# Patient Record
Sex: MALE | Race: BLACK OR AFRICAN AMERICAN | NOT HISPANIC OR LATINO | Employment: UNEMPLOYED | RURAL
[De-identification: names, ages, dates, MRNs, and addresses within clinical notes are randomized per-mention and may not be internally consistent; named-entity substitution may affect disease eponyms.]

---

## 2020-11-17 ENCOUNTER — HISTORICAL (OUTPATIENT)
Dept: ADMINISTRATIVE | Facility: HOSPITAL | Age: 35
End: 2020-11-17

## 2020-11-17 LAB
BACTERIA #/AREA URNS HPF: ABNORMAL /HPF
BILIRUB UR QL STRIP: NEGATIVE MG/DL
CLARITY UR: ABNORMAL
COLOR UR: ABNORMAL
GLUCOSE UR STRIP-MCNC: NORMAL MG/DL
KETONES UR STRIP-SCNC: NEGATIVE MG/DL
LEUKOCYTE ESTERASE UR QL STRIP: ABNORMAL LEU/UL
NITRITE UR QL STRIP: NEGATIVE MG/DL
PH UR STRIP: 7 PH UNITS (ref 5–8)
PROT UR QL STRIP: >=300 MG/DL
RBC # UR STRIP: ABNORMAL ERY/UL
RBC #/AREA URNS HPF: ABNORMAL /HPF (ref 0–3)
SP GR UR STRIP: 1.02 (ref 1–1.03)
SQUAMOUS #/AREA URNS LPF: ABNORMAL /LPF
UROBILINOGEN UR STRIP-ACNC: 0.2 MG/DL
WBC #/AREA URNS HPF: ABNORMAL /HPF (ref 0–5)

## 2020-11-21 LAB
REPORT: NORMAL

## 2020-11-28 ENCOUNTER — HISTORICAL (OUTPATIENT)
Dept: ADMINISTRATIVE | Facility: HOSPITAL | Age: 35
End: 2020-11-28

## 2020-11-29 LAB
EST. AVERAGE GLUCOSE BLD GHB EST-MCNC: 124 MG/DL
HBA1C MFR BLD HPLC: 6.3 %

## 2020-12-01 ENCOUNTER — HISTORICAL (OUTPATIENT)
Dept: ADMINISTRATIVE | Facility: HOSPITAL | Age: 35
End: 2020-12-01

## 2020-12-03 ENCOUNTER — HISTORICAL (OUTPATIENT)
Dept: ADMINISTRATIVE | Facility: HOSPITAL | Age: 35
End: 2020-12-03

## 2020-12-06 LAB
REPORT: NORMAL

## 2020-12-22 ENCOUNTER — HISTORICAL (OUTPATIENT)
Dept: ADMINISTRATIVE | Facility: HOSPITAL | Age: 35
End: 2020-12-22

## 2020-12-22 LAB
ALBUMIN SERPL BCP-MCNC: 3.2 G/DL (ref 3.4–5)
ALBUMIN/GLOB SERPL: 0.8 {RATIO}
ALP SERPL-CCNC: 64 U/L (ref 46–116)
ALT SERPL W P-5'-P-CCNC: 22 U/L (ref 14–63)
ANION GAP SERPL CALCULATED.3IONS-SCNC: 25 MMOL/L
ANISOCYTOSIS BLD QL SMEAR: ABNORMAL
AST SERPL W P-5'-P-CCNC: 15 U/L (ref 15–37)
BASOPHILS # BLD AUTO: 0.01 X10E3/UL (ref 0–0.2)
BASOPHILS NFR BLD AUTO: 0.2 % (ref 0–1)
BILIRUB SERPL-MCNC: 0.4 MG/DL (ref 0.2–1)
BUN SERPL-MCNC: 123 MG/DL (ref 9–20)
BUN/CREAT SERPL: 11.4
CALCIUM SERPL-MCNC: 8.8 MG/DL (ref 8.5–10.1)
CHLORIDE SERPL-SCNC: 103 MMOL/L (ref 98–107)
CO2 SERPL-SCNC: 18 MMOL/L (ref 21–32)
CREAT SERPL-MCNC: 10.79 MG/DL (ref 0.66–1.25)
CRENATED CELLS: ABNORMAL
D DIMER PPP FEU-MCNC: 1.58 UG/ML (ref 0–0.47)
EOSINOPHIL # BLD AUTO: 0.18 X10E3/UL (ref 0–0.5)
EOSINOPHIL NFR BLD AUTO: 4.3 % (ref 1–4)
EOSINOPHIL NFR BLD MANUAL: 4 % (ref 1–4)
ERYTHROCYTE [DISTWIDTH] IN BLOOD BY AUTOMATED COUNT: 14.9 % (ref 11.5–14.5)
GLOBULIN SER-MCNC: 4.1 G/DL
GLUCOSE SERPL-MCNC: 117 MG/DL (ref 74–106)
HCT VFR BLD AUTO: 37 % (ref 40–54)
HGB BLD-MCNC: 11.9 G/DL (ref 13.5–18)
LYMPHOCYTES # BLD AUTO: 0.66 X10E3/UL (ref 1–4.8)
LYMPHOCYTES NFR BLD AUTO: 15.8 % (ref 27–41)
LYMPHOCYTES NFR BLD MANUAL: 14 % (ref 27–41)
MCH RBC QN AUTO: 28.5 PG (ref 27–31)
MCHC RBC AUTO-ENTMCNC: 32.2 G/DL (ref 32–36)
MCV RBC AUTO: 89 FL (ref 80–96)
MONOCYTES # BLD AUTO: 0.37 X10E3/UL (ref 0–0.8)
MONOCYTES NFR BLD AUTO: 8.8 % (ref 2–6)
MONOCYTES NFR BLD MANUAL: 12 % (ref 2–6)
MPC BLD CALC-MCNC: 12.4 FL (ref 9.4–12.4)
NEUTROPHILS # BLD AUTO: 2.97 X10E3/UL (ref 1.8–7.7)
NEUTROPHILS NFR BLD AUTO: 70.9 % (ref 53–65)
NEUTS SEG NFR BLD MANUAL: 70 % (ref 50–62)
OVALOCYTES BLD QL SMEAR: ABNORMAL
PLATELET # BLD AUTO: 134 X10E3/UL (ref 150–400)
PLATELET MORPHOLOGY: ABNORMAL
POTASSIUM SERPL-SCNC: 5.5 MMOL/L (ref 3.5–5.1)
PROT SERPL-MCNC: 7.3 G/DL (ref 6.4–8.2)
RBC # BLD AUTO: 4.18 X10E6/UL (ref 4.6–6.2)
SODIUM SERPL-SCNC: 140 MMOL/L (ref 136–145)
WBC # BLD AUTO: 4.19 X10E3/UL (ref 4.5–11)

## 2020-12-23 LAB
CRP SERPL-MCNC: 0.36 MG/DL (ref 0–0.8)
FERRITIN SERPL-MCNC: 534 NG/ML (ref 26–388)

## 2021-01-22 ENCOUNTER — HISTORICAL (OUTPATIENT)
Dept: ADMINISTRATIVE | Facility: HOSPITAL | Age: 36
End: 2021-01-22

## 2021-01-22 LAB
BACTERIA #/AREA URNS HPF: ABNORMAL /HPF
BILIRUB UR QL STRIP: NEGATIVE MG/DL
CLARITY UR: ABNORMAL
COLOR UR: ABNORMAL
GLUCOSE UR STRIP-MCNC: NEGATIVE MG/DL
KETONES UR STRIP-SCNC: NEGATIVE MG/DL
LEUKOCYTE ESTERASE UR QL STRIP: ABNORMAL LEU/UL
NITRITE UR QL STRIP: NEGATIVE MG/DL
PH UR STRIP: 6.5 PH UNITS (ref 5–8)
PROT UR QL STRIP: >=300 MG/DL
RBC # UR STRIP: ABNORMAL ERY/UL
RBC #/AREA URNS HPF: ABNORMAL /HPF (ref 0–3)
SP GR UR STRIP: 1.02 (ref 1–1.03)
SQUAMOUS #/AREA URNS LPF: ABNORMAL /LPF
UROBILINOGEN UR STRIP-ACNC: 0.2 MG/DL
WBC #/AREA URNS HPF: ABNORMAL /HPF (ref 0–5)

## 2021-01-25 LAB
REPORT: 38
REPORT: NORMAL

## 2021-02-06 ENCOUNTER — HISTORICAL (OUTPATIENT)
Dept: ADMINISTRATIVE | Facility: HOSPITAL | Age: 36
End: 2021-02-06

## 2021-02-07 LAB
EST. AVERAGE GLUCOSE BLD GHB EST-MCNC: 127 MG/DL
HBA1C MFR BLD HPLC: 6.4 %

## 2021-02-20 ENCOUNTER — HISTORICAL (OUTPATIENT)
Dept: ADMINISTRATIVE | Facility: HOSPITAL | Age: 36
End: 2021-02-20

## 2021-02-24 LAB
REPORT: 38                 38
REPORT: NORMAL

## 2021-02-25 LAB
REPORT: 38                 38
REPORT: NORMAL

## 2021-07-01 ENCOUNTER — LAB REQUISITION (OUTPATIENT)
Dept: LAB | Facility: HOSPITAL | Age: 36
End: 2021-07-01
Attending: FAMILY MEDICINE
Payer: MEDICARE

## 2021-07-01 DIAGNOSIS — T81.89XA OTHER COMPLICATIONS OF PROCEDURES, NOT ELSEWHERE CLASSIFIED, INITIAL ENCOUNTER: ICD-10-CM

## 2021-07-01 PROCEDURE — 87070 CULTURE OTHR SPECIMN AEROBIC: CPT | Mod: 59

## 2021-07-03 LAB
MICROORGANISM SPEC CULT: ABNORMAL
MICROORGANISM SPEC CULT: ABNORMAL

## 2021-07-14 ENCOUNTER — LAB REQUISITION (OUTPATIENT)
Dept: LAB | Facility: HOSPITAL | Age: 36
End: 2021-07-14
Attending: FAMILY MEDICINE
Payer: MEDICARE

## 2021-07-14 DIAGNOSIS — S81.802A UNSPECIFIED OPEN WOUND, LEFT LOWER LEG, INITIAL ENCOUNTER: ICD-10-CM

## 2021-07-14 DIAGNOSIS — S81.801A UNSPECIFIED OPEN WOUND, RIGHT LOWER LEG, INITIAL ENCOUNTER: ICD-10-CM

## 2021-07-14 PROCEDURE — 87070 CULTURE OTHR SPECIMN AEROBIC: CPT | Performed by: FAMILY MEDICINE

## 2021-07-17 LAB
MICROORGANISM SPEC CULT: ABNORMAL

## 2021-08-08 ENCOUNTER — LAB REQUISITION (OUTPATIENT)
Dept: LAB | Facility: HOSPITAL | Age: 36
End: 2021-08-08
Attending: FAMILY MEDICINE
Payer: MEDICARE

## 2021-08-08 DIAGNOSIS — N39.0 URINARY TRACT INFECTION, SITE NOT SPECIFIED: ICD-10-CM

## 2021-08-08 LAB
BACTERIA #/AREA URNS HPF: ABNORMAL /HPF
BILIRUB UR QL STRIP: NEGATIVE
CLARITY UR: ABNORMAL
COLOR UR: YELLOW
GLUCOSE UR STRIP-MCNC: NEGATIVE MG/DL
KETONES UR STRIP-SCNC: NEGATIVE MG/DL
LEUKOCYTE ESTERASE UR QL STRIP: ABNORMAL
MUCOUS THREADS #/AREA URNS HPF: ABNORMAL /HPF
NITRITE UR QL STRIP: NEGATIVE
PH UR STRIP: 7.5 PH UNITS
PROT UR QL STRIP: >=300
RBC # UR STRIP: ABNORMAL /UL
RBC #/AREA URNS HPF: ABNORMAL /HPF
RENAL EPI CELLS #/AREA URNS LPF: ABNORMAL /LPF
SP GR UR STRIP: 1.02
TRANS CELLS #/AREA URNS LPF: ABNORMAL /LPF
UROBILINOGEN UR STRIP-ACNC: 0.2 MG/DL
WBC #/AREA URNS HPF: ABNORMAL /HPF

## 2021-08-08 PROCEDURE — 81001 URINALYSIS AUTO W/SCOPE: CPT | Performed by: FAMILY MEDICINE

## 2021-08-08 PROCEDURE — 87086 URINE CULTURE/COLONY COUNT: CPT | Performed by: FAMILY MEDICINE

## 2021-08-08 PROCEDURE — 87077 CULTURE AEROBIC IDENTIFY: CPT | Performed by: FAMILY MEDICINE

## 2021-08-12 LAB — UA COMPLETE W REFLEX CULTURE PNL UR: ABNORMAL

## 2021-08-24 PROCEDURE — 87077 CULTURE AEROBIC IDENTIFY: CPT | Mod: 91 | Performed by: FAMILY MEDICINE

## 2021-08-24 PROCEDURE — 81003 URINALYSIS AUTO W/O SCOPE: CPT | Performed by: FAMILY MEDICINE

## 2021-08-24 PROCEDURE — 81001 URINALYSIS AUTO W/SCOPE: CPT | Performed by: FAMILY MEDICINE

## 2021-08-24 PROCEDURE — 87086 URINE CULTURE/COLONY COUNT: CPT | Mod: 91 | Performed by: FAMILY MEDICINE

## 2021-08-25 ENCOUNTER — LAB REQUISITION (OUTPATIENT)
Dept: LAB | Facility: HOSPITAL | Age: 36
End: 2021-08-25
Attending: FAMILY MEDICINE
Payer: MEDICARE

## 2021-08-25 DIAGNOSIS — N39.0 URINARY TRACT INFECTION, SITE NOT SPECIFIED: ICD-10-CM

## 2021-08-25 LAB
AMORPH PHOS CRY #/AREA URNS LPF: ABNORMAL /LPF
BACTERIA #/AREA URNS HPF: ABNORMAL /HPF
BILIRUB UR QL STRIP: NEGATIVE
CLARITY UR: ABNORMAL
COLOR UR: YELLOW
GLUCOSE UR STRIP-MCNC: NEGATIVE MG/DL
KETONES UR STRIP-SCNC: NEGATIVE MG/DL
LEUKOCYTE ESTERASE UR QL STRIP: ABNORMAL
NITRITE UR QL STRIP: NEGATIVE
PH UR STRIP: 7 PH UNITS
PROT UR QL STRIP: >=300
RBC # UR STRIP: ABNORMAL /UL
RBC #/AREA URNS HPF: ABNORMAL /HPF
SP GR UR STRIP: 1.02
SQUAMOUS #/AREA URNS LPF: ABNORMAL /LPF
UROBILINOGEN UR STRIP-ACNC: 0.2 MG/DL
WBC #/AREA URNS HPF: ABNORMAL /HPF

## 2021-08-28 LAB
UA COMPLETE W REFLEX CULTURE PNL UR: ABNORMAL
UA COMPLETE W REFLEX CULTURE PNL UR: ABNORMAL

## 2021-10-01 ENCOUNTER — LAB REQUISITION (OUTPATIENT)
Dept: LAB | Facility: HOSPITAL | Age: 36
End: 2021-10-01
Attending: FAMILY MEDICINE
Payer: MEDICARE

## 2021-10-01 DIAGNOSIS — N39.0 URINARY TRACT INFECTION, SITE NOT SPECIFIED: ICD-10-CM

## 2021-10-01 LAB
BACTERIA #/AREA URNS HPF: ABNORMAL /HPF
BILIRUB UR QL STRIP: NEGATIVE
CLARITY UR: ABNORMAL
COLOR UR: YELLOW
GLUCOSE UR STRIP-MCNC: NEGATIVE MG/DL
KETONES UR STRIP-SCNC: NEGATIVE MG/DL
LEUKOCYTE ESTERASE UR QL STRIP: ABNORMAL
MUCOUS THREADS #/AREA URNS HPF: ABNORMAL /HPF
NITRITE UR QL STRIP: NEGATIVE
PH UR STRIP: 6.5 PH UNITS
PROT UR QL STRIP: 100
RBC # UR STRIP: ABNORMAL /UL
RBC #/AREA URNS HPF: ABNORMAL /HPF
RENAL EPI CELLS #/AREA URNS LPF: ABNORMAL /LPF
SP GR UR STRIP: 1.02
SQUAMOUS #/AREA URNS LPF: ABNORMAL /LPF
TRANS CELLS #/AREA URNS LPF: ABNORMAL /LPF
UROBILINOGEN UR STRIP-ACNC: 0.2 MG/DL
WBC #/AREA URNS HPF: ABNORMAL /HPF
WBC CLUMPS, UA: ABNORMAL /HPF

## 2021-10-01 PROCEDURE — 87186 SC STD MICRODIL/AGAR DIL: CPT | Performed by: FAMILY MEDICINE

## 2021-10-01 PROCEDURE — 87077 CULTURE AEROBIC IDENTIFY: CPT | Performed by: FAMILY MEDICINE

## 2021-10-01 PROCEDURE — 81003 URINALYSIS AUTO W/O SCOPE: CPT | Performed by: FAMILY MEDICINE

## 2021-10-01 PROCEDURE — 81001 URINALYSIS AUTO W/SCOPE: CPT | Performed by: FAMILY MEDICINE

## 2021-10-05 LAB — UA COMPLETE W REFLEX CULTURE PNL UR: ABNORMAL

## 2021-10-17 ENCOUNTER — LAB REQUISITION (OUTPATIENT)
Dept: LAB | Facility: HOSPITAL | Age: 36
End: 2021-10-17
Attending: FAMILY MEDICINE
Payer: MEDICARE

## 2021-10-17 DIAGNOSIS — R39.9 UNSPECIFIED SYMPTOMS AND SIGNS INVOLVING THE GENITOURINARY SYSTEM: ICD-10-CM

## 2021-10-17 LAB
AMORPH PHOS CRY #/AREA URNS LPF: ABNORMAL /LPF
BACTERIA #/AREA URNS HPF: ABNORMAL /HPF
BILIRUB UR QL STRIP: NEGATIVE
CLARITY UR: ABNORMAL
COLOR UR: ABNORMAL
GLUCOSE UR STRIP-MCNC: NEGATIVE MG/DL
KETONES UR STRIP-SCNC: NEGATIVE MG/DL
LEUKOCYTE ESTERASE UR QL STRIP: ABNORMAL
NITRITE UR QL STRIP: NEGATIVE
PH UR STRIP: 7 PH UNITS
PROT UR QL STRIP: >=300
RBC # UR STRIP: ABNORMAL /UL
RBC #/AREA URNS HPF: ABNORMAL /HPF
SP GR UR STRIP: 1.02
SQUAMOUS #/AREA URNS LPF: ABNORMAL /LPF
UROBILINOGEN UR STRIP-ACNC: 0.2 MG/DL
WBC #/AREA URNS HPF: ABNORMAL /HPF

## 2021-10-17 PROCEDURE — 87186 SC STD MICRODIL/AGAR DIL: CPT | Performed by: FAMILY MEDICINE

## 2021-10-17 PROCEDURE — 81003 URINALYSIS AUTO W/O SCOPE: CPT | Performed by: FAMILY MEDICINE

## 2021-10-17 PROCEDURE — 81001 URINALYSIS AUTO W/SCOPE: CPT | Performed by: FAMILY MEDICINE

## 2021-10-17 PROCEDURE — 87077 CULTURE AEROBIC IDENTIFY: CPT | Performed by: FAMILY MEDICINE

## 2021-10-22 LAB — UA COMPLETE W REFLEX CULTURE PNL UR: ABNORMAL

## 2021-12-12 ENCOUNTER — LAB REQUISITION (OUTPATIENT)
Dept: LAB | Facility: HOSPITAL | Age: 36
End: 2021-12-12
Attending: FAMILY MEDICINE
Payer: MEDICARE

## 2021-12-12 DIAGNOSIS — Z20.828 CONTACT WITH AND (SUSPECTED) EXPOSURE TO OTHER VIRAL COMMUNICABLE DISEASES: ICD-10-CM

## 2021-12-12 LAB
FLUAV AG UPPER RESP QL IA.RAPID: NEGATIVE
FLUBV AG UPPER RESP QL IA.RAPID: NEGATIVE

## 2021-12-12 PROCEDURE — 87804 INFLUENZA ASSAY W/OPTIC: CPT | Mod: 59 | Performed by: FAMILY MEDICINE

## 2022-01-22 ENCOUNTER — HOSPITAL ENCOUNTER (OUTPATIENT)
Dept: RADIOLOGY | Facility: HOSPITAL | Age: 37
Discharge: HOME | End: 2022-01-22
Attending: FAMILY MEDICINE
Payer: MEDICARE

## 2022-01-22 DIAGNOSIS — J90 PLEURAL EFFUSION: Primary | ICD-10-CM

## 2022-01-22 DIAGNOSIS — J90 PLEURAL EFFUSION: ICD-10-CM

## 2022-01-22 PROCEDURE — 71045 X-RAY EXAM CHEST 1 VIEW: CPT | Mod: TC

## 2022-07-14 ENCOUNTER — HOSPITAL ENCOUNTER (OUTPATIENT)
Dept: RADIOLOGY | Facility: HOSPITAL | Age: 37
Discharge: HOME | End: 2022-07-14
Attending: FAMILY MEDICINE
Payer: MEDICARE

## 2022-07-14 DIAGNOSIS — R10.9 RIGHT SIDED ABDOMINAL PAIN: ICD-10-CM

## 2022-07-14 DIAGNOSIS — R10.9 RIGHT SIDED ABDOMINAL PAIN: Primary | ICD-10-CM

## 2022-07-14 PROCEDURE — 74176 CT ABD & PELVIS W/O CONTRAST: CPT

## 2023-05-25 ENCOUNTER — HOSPITAL ENCOUNTER (OUTPATIENT)
Facility: HOSPITAL | Age: 38
Discharge: ANOTHER HOSPITAL | End: 2023-05-26
Attending: HOSPITALIST | Admitting: HOSPITALIST
Payer: MEDICARE

## 2023-05-25 DIAGNOSIS — M86.9 OSTEOMYELITIS, UNSPECIFIED SITE, UNSPECIFIED TYPE: Primary | ICD-10-CM

## 2023-05-25 DIAGNOSIS — M86.9 OSTEOMYELITIS OF LEFT FOOT, UNSPECIFIED TYPE: ICD-10-CM

## 2023-05-25 DIAGNOSIS — T14.8XXA WOUND INFECTION: ICD-10-CM

## 2023-05-25 DIAGNOSIS — R07.9 CHEST PAIN: ICD-10-CM

## 2023-05-25 DIAGNOSIS — L08.9 WOUND INFECTION: ICD-10-CM

## 2023-05-25 DIAGNOSIS — M86.9 OSTEOMYELITIS OF LEFT FOOT: ICD-10-CM

## 2023-05-25 PROBLEM — F32.9 MAJOR DEPRESSION: Status: ACTIVE | Noted: 2023-05-25

## 2023-05-25 PROBLEM — K59.00 CONSTIPATION: Chronic | Status: ACTIVE | Noted: 2023-05-25

## 2023-05-25 PROBLEM — E11.29 TYPE 2 DIABETES MELLITUS WITH KIDNEY COMPLICATION, WITH LONG-TERM CURRENT USE OF INSULIN: Chronic | Status: ACTIVE | Noted: 2023-05-25

## 2023-05-25 PROBLEM — N18.6 ESRD (END STAGE RENAL DISEASE): Chronic | Status: ACTIVE | Noted: 2023-05-25

## 2023-05-25 PROBLEM — Z79.4 TYPE 2 DIABETES MELLITUS WITH KIDNEY COMPLICATION, WITH LONG-TERM CURRENT USE OF INSULIN: Chronic | Status: ACTIVE | Noted: 2023-05-25

## 2023-05-25 PROBLEM — I10 HTN (HYPERTENSION): Chronic | Status: ACTIVE | Noted: 2023-05-25

## 2023-05-25 PROBLEM — F32.1 CURRENT MODERATE EPISODE OF MAJOR DEPRESSIVE DISORDER: Chronic | Status: ACTIVE | Noted: 2023-05-25

## 2023-05-25 LAB
ALBUMIN SERPL BCP-MCNC: 2.9 G/DL (ref 3.5–5)
ALBUMIN SERPL BCP-MCNC: 3 G/DL (ref 3.5–5)
ALBUMIN/GLOB SERPL: 0.6 {RATIO}
ALP SERPL-CCNC: 131 U/L (ref 45–115)
ALT SERPL W P-5'-P-CCNC: 19 U/L (ref 16–61)
ANION GAP SERPL CALCULATED.3IONS-SCNC: 14 MMOL/L (ref 7–16)
ANION GAP SERPL CALCULATED.3IONS-SCNC: 7 MMOL/L (ref 7–16)
APTT PPP: 40.9 SECONDS (ref 25.2–37.3)
AST SERPL W P-5'-P-CCNC: 7 U/L (ref 15–37)
BASOPHILS # BLD AUTO: 0.06 K/UL (ref 0–0.2)
BASOPHILS NFR BLD AUTO: 0.7 % (ref 0–1)
BILIRUB SERPL-MCNC: 0.5 MG/DL (ref ?–1.2)
BUN SERPL-MCNC: 40 MG/DL (ref 7–18)
BUN SERPL-MCNC: 47 MG/DL (ref 7–18)
BUN/CREAT SERPL: 10 (ref 6–20)
BUN/CREAT SERPL: 11 (ref 6–20)
CALCIUM SERPL-MCNC: 9.3 MG/DL (ref 8.5–10.1)
CALCIUM SERPL-MCNC: 9.5 MG/DL (ref 8.5–10.1)
CHLORIDE SERPL-SCNC: 104 MMOL/L (ref 98–107)
CHLORIDE SERPL-SCNC: 108 MMOL/L (ref 98–107)
CO2 SERPL-SCNC: 24 MMOL/L (ref 21–32)
CO2 SERPL-SCNC: 27 MMOL/L (ref 21–32)
CREAT SERPL-MCNC: 3.84 MG/DL (ref 0.7–1.3)
CREAT SERPL-MCNC: 4.17 MG/DL (ref 0.7–1.3)
CRP SERPL-MCNC: 5.75 MG/DL (ref 0–0.8)
DIFFERENTIAL METHOD BLD: ABNORMAL
EGFR (NO RACE VARIABLE) (RUSH/TITUS): 18 ML/MIN/1.73M2
EGFR (NO RACE VARIABLE) (RUSH/TITUS): 20 ML/MIN/1.73M2
EOSINOPHIL # BLD AUTO: 0.65 K/UL (ref 0–0.5)
EOSINOPHIL NFR BLD AUTO: 8 % (ref 1–4)
ERYTHROCYTE [DISTWIDTH] IN BLOOD BY AUTOMATED COUNT: 14.6 % (ref 11.5–14.5)
ERYTHROCYTE [SEDIMENTATION RATE] IN BLOOD BY WESTERGREN METHOD: 55 MM/HR (ref 0–15)
GLOBULIN SER-MCNC: 5.2 G/DL (ref 2–4)
GLUCOSE SERPL-MCNC: 117 MG/DL (ref 70–105)
GLUCOSE SERPL-MCNC: 137 MG/DL (ref 74–106)
GLUCOSE SERPL-MCNC: 211 MG/DL (ref 70–105)
GLUCOSE SERPL-MCNC: 52 MG/DL (ref 74–106)
HCT VFR BLD AUTO: 38.6 % (ref 40–54)
HGB BLD-MCNC: 11.9 G/DL (ref 13.5–18)
IMM GRANULOCYTES # BLD AUTO: 0.03 K/UL (ref 0–0.04)
IMM GRANULOCYTES NFR BLD: 0.4 % (ref 0–0.4)
INR BLD: 1.07
LYMPHOCYTES # BLD AUTO: 0.91 K/UL (ref 1–4.8)
LYMPHOCYTES NFR BLD AUTO: 11.2 % (ref 27–41)
MCH RBC QN AUTO: 27.1 PG (ref 27–31)
MCHC RBC AUTO-ENTMCNC: 30.8 G/DL (ref 32–36)
MCV RBC AUTO: 87.9 FL (ref 80–96)
MONOCYTES # BLD AUTO: 0.74 K/UL (ref 0–0.8)
MONOCYTES NFR BLD AUTO: 9.1 % (ref 2–6)
MPC BLD CALC-MCNC: 9.7 FL (ref 9.4–12.4)
NEUTROPHILS # BLD AUTO: 5.75 K/UL (ref 1.8–7.7)
NEUTROPHILS NFR BLD AUTO: 70.6 % (ref 53–65)
NRBC # BLD AUTO: 0 X10E3/UL
NRBC, AUTO (.00): 0 %
PHOSPHATE SERPL-MCNC: 3.4 MG/DL (ref 2.5–4.5)
PLATELET # BLD AUTO: 276 K/UL (ref 150–400)
POTASSIUM SERPL-SCNC: 4.5 MMOL/L (ref 3.5–5.1)
POTASSIUM SERPL-SCNC: 4.7 MMOL/L (ref 3.5–5.1)
PROT SERPL-MCNC: 8.2 G/DL (ref 6.4–8.2)
PROTHROMBIN TIME: 13.5 SECONDS (ref 11.7–14.7)
RBC # BLD AUTO: 4.39 M/UL (ref 4.6–6.2)
SARS-COV-2 RDRP RESP QL NAA+PROBE: NEGATIVE
SODIUM SERPL-SCNC: 137 MMOL/L (ref 136–145)
SODIUM SERPL-SCNC: 137 MMOL/L (ref 136–145)
WBC # BLD AUTO: 8.14 K/UL (ref 4.5–11)

## 2023-05-25 PROCEDURE — 99285 EMERGENCY DEPT VISIT HI MDM: CPT | Mod: ,,, | Performed by: NURSE PRACTITIONER

## 2023-05-25 PROCEDURE — 85730 THROMBOPLASTIN TIME PARTIAL: CPT | Performed by: HOSPITALIST

## 2023-05-25 PROCEDURE — 99223 1ST HOSP IP/OBS HIGH 75: CPT | Mod: $0,AI,, | Performed by: HOSPITALIST

## 2023-05-25 PROCEDURE — 85025 COMPLETE CBC W/AUTO DIFF WBC: CPT | Performed by: NURSE PRACTITIONER

## 2023-05-25 PROCEDURE — G0378 HOSPITAL OBSERVATION PER HR: HCPCS

## 2023-05-25 PROCEDURE — 87070 CULTURE OTHR SPECIMN AEROBIC: CPT | Mod: 59 | Performed by: HOSPITALIST

## 2023-05-25 PROCEDURE — 96361 HYDRATE IV INFUSION ADD-ON: CPT

## 2023-05-25 PROCEDURE — 63600175 PHARM REV CODE 636 W HCPCS: Performed by: HOSPITALIST

## 2023-05-25 PROCEDURE — 99285 PR EMERGENCY DEPT VISIT,LEVEL V: ICD-10-PCS | Mod: ,,, | Performed by: NURSE PRACTITIONER

## 2023-05-25 PROCEDURE — 87635 SARS-COV-2 COVID-19 AMP PRB: CPT | Performed by: NURSE PRACTITIONER

## 2023-05-25 PROCEDURE — 99223 PR INITIAL HOSPITAL CARE,LEVL III: ICD-10-PCS | Mod: $0,AI,, | Performed by: HOSPITALIST

## 2023-05-25 PROCEDURE — 80069 RENAL FUNCTION PANEL: CPT | Mod: 59 | Performed by: HOSPITALIST

## 2023-05-25 PROCEDURE — 82947 ASSAY GLUCOSE BLOOD QUANT: CPT

## 2023-05-25 PROCEDURE — 96368 THER/DIAG CONCURRENT INF: CPT

## 2023-05-25 PROCEDURE — 85651 RBC SED RATE NONAUTOMATED: CPT | Performed by: HOSPITALIST

## 2023-05-25 PROCEDURE — 96372 THER/PROPH/DIAG INJ SC/IM: CPT | Performed by: HOSPITALIST

## 2023-05-25 PROCEDURE — 80053 COMPREHEN METABOLIC PANEL: CPT | Performed by: NURSE PRACTITIONER

## 2023-05-25 PROCEDURE — 25000003 PHARM REV CODE 250: Performed by: HOSPITALIST

## 2023-05-25 PROCEDURE — 86140 C-REACTIVE PROTEIN: CPT | Performed by: NURSE PRACTITIONER

## 2023-05-25 PROCEDURE — 63600175 PHARM REV CODE 636 W HCPCS: Mod: GZ | Performed by: HOSPITALIST

## 2023-05-25 PROCEDURE — 99285 EMERGENCY DEPT VISIT HI MDM: CPT

## 2023-05-25 PROCEDURE — 82962 GLUCOSE BLOOD TEST: CPT | Mod: 91

## 2023-05-25 PROCEDURE — 96366 THER/PROPH/DIAG IV INF ADDON: CPT

## 2023-05-25 PROCEDURE — 87040 BLOOD CULTURE FOR BACTERIA: CPT | Performed by: HOSPITALIST

## 2023-05-25 PROCEDURE — 96365 THER/PROPH/DIAG IV INF INIT: CPT

## 2023-05-25 PROCEDURE — 85610 PROTHROMBIN TIME: CPT | Performed by: HOSPITALIST

## 2023-05-25 RX ORDER — TALC
6 POWDER (GRAM) TOPICAL NIGHTLY PRN
Status: DISCONTINUED | OUTPATIENT
Start: 2023-05-25 | End: 2023-05-26 | Stop reason: HOSPADM

## 2023-05-25 RX ORDER — GLUCAGON INJECTION, SOLUTION 1 MG/.2ML
INJECTION, SOLUTION SUBCUTANEOUS
COMMUNITY
Start: 2022-09-21 | End: 2023-05-26

## 2023-05-25 RX ORDER — AMLODIPINE BESYLATE 5 MG/1
10 TABLET ORAL DAILY
Status: DISCONTINUED | OUTPATIENT
Start: 2023-05-26 | End: 2023-05-26 | Stop reason: HOSPADM

## 2023-05-25 RX ORDER — BUPROPION HYDROCHLORIDE 150 MG/1
150 TABLET, EXTENDED RELEASE ORAL 2 TIMES DAILY
Status: DISCONTINUED | OUTPATIENT
Start: 2023-05-25 | End: 2023-05-26 | Stop reason: HOSPADM

## 2023-05-25 RX ORDER — LANOLIN ALCOHOL/MO/W.PET/CERES
1 CREAM (GRAM) TOPICAL 2 TIMES DAILY
Status: DISCONTINUED | OUTPATIENT
Start: 2023-05-25 | End: 2023-05-26 | Stop reason: HOSPADM

## 2023-05-25 RX ORDER — ACETAMINOPHEN 325 MG/1
650 TABLET ORAL EVERY 4 HOURS PRN
Status: DISCONTINUED | OUTPATIENT
Start: 2023-05-25 | End: 2023-05-26 | Stop reason: HOSPADM

## 2023-05-25 RX ORDER — LOSARTAN POTASSIUM 100 MG/1
100 TABLET ORAL DAILY
COMMUNITY
End: 2023-05-26

## 2023-05-25 RX ORDER — FERROUS SULFATE 325(65) MG
325 TABLET ORAL
COMMUNITY
End: 2023-05-26

## 2023-05-25 RX ORDER — PANTOPRAZOLE SODIUM 40 MG/1
TABLET, DELAYED RELEASE ORAL
COMMUNITY
Start: 2022-06-29 | End: 2023-05-26

## 2023-05-25 RX ORDER — POLYETHYLENE GLYCOL 3350 17 G/17G
17 POWDER, FOR SOLUTION ORAL DAILY PRN
Status: DISCONTINUED | OUTPATIENT
Start: 2023-05-25 | End: 2023-05-26 | Stop reason: HOSPADM

## 2023-05-25 RX ORDER — NALOXONE HCL 0.4 MG/ML
0.02 VIAL (ML) INJECTION
Status: DISCONTINUED | OUTPATIENT
Start: 2023-05-25 | End: 2023-05-26 | Stop reason: HOSPADM

## 2023-05-25 RX ORDER — CILOSTAZOL 100 MG/1
100 TABLET ORAL 2 TIMES DAILY
Status: DISCONTINUED | OUTPATIENT
Start: 2023-05-25 | End: 2023-05-26 | Stop reason: HOSPADM

## 2023-05-25 RX ORDER — CARVEDILOL 25 MG/1
12.5 TABLET ORAL 2 TIMES DAILY WITH MEALS
COMMUNITY

## 2023-05-25 RX ORDER — AMLODIPINE BESYLATE 10 MG/1
10 TABLET ORAL DAILY
COMMUNITY

## 2023-05-25 RX ORDER — IBUPROFEN 200 MG
16 TABLET ORAL
Status: DISCONTINUED | OUTPATIENT
Start: 2023-05-25 | End: 2023-05-26 | Stop reason: HOSPADM

## 2023-05-25 RX ORDER — LACTULOSE 10 G/15ML
20 SOLUTION ORAL
Status: DISCONTINUED | OUTPATIENT
Start: 2023-05-27 | End: 2023-05-26 | Stop reason: HOSPADM

## 2023-05-25 RX ORDER — DOCUSATE SODIUM 100 MG/1
100 CAPSULE, LIQUID FILLED ORAL 2 TIMES DAILY
Status: DISCONTINUED | OUTPATIENT
Start: 2023-05-25 | End: 2023-05-26 | Stop reason: HOSPADM

## 2023-05-25 RX ORDER — IBUPROFEN 200 MG
24 TABLET ORAL
Status: DISCONTINUED | OUTPATIENT
Start: 2023-05-25 | End: 2023-05-26 | Stop reason: HOSPADM

## 2023-05-25 RX ORDER — GLUCAGON 1 MG
1 KIT INJECTION
Status: DISCONTINUED | OUTPATIENT
Start: 2023-05-25 | End: 2023-05-26 | Stop reason: HOSPADM

## 2023-05-25 RX ORDER — INSULIN DETEMIR 100 [IU]/ML
INJECTION, SOLUTION SUBCUTANEOUS
COMMUNITY
Start: 2022-09-23 | End: 2023-05-26

## 2023-05-25 RX ORDER — SEVELAMER CARBONATE 800 MG/1
800 TABLET, FILM COATED ORAL
COMMUNITY
Start: 2022-11-14 | End: 2023-11-14

## 2023-05-25 RX ORDER — DOCUSATE SODIUM 100 MG/1
100 CAPSULE, LIQUID FILLED ORAL
COMMUNITY
End: 2023-05-26

## 2023-05-25 RX ORDER — LOSARTAN POTASSIUM 100 MG/1
100 TABLET ORAL NIGHTLY
Status: DISCONTINUED | OUTPATIENT
Start: 2023-05-25 | End: 2023-05-26 | Stop reason: HOSPADM

## 2023-05-25 RX ORDER — CARVEDILOL 12.5 MG/1
12.5 TABLET ORAL 2 TIMES DAILY WITH MEALS
Status: DISCONTINUED | OUTPATIENT
Start: 2023-05-25 | End: 2023-05-26

## 2023-05-25 RX ORDER — BUPROPION HYDROCHLORIDE 150 MG/1
150 TABLET, EXTENDED RELEASE ORAL DAILY
COMMUNITY

## 2023-05-25 RX ORDER — SEVELAMER CARBONATE 800 MG/1
800 TABLET, FILM COATED ORAL
Status: DISCONTINUED | OUTPATIENT
Start: 2023-05-26 | End: 2023-05-26 | Stop reason: HOSPADM

## 2023-05-25 RX ORDER — SODIUM CHLORIDE 0.9 % (FLUSH) 0.9 %
3 SYRINGE (ML) INJECTION EVERY 12 HOURS PRN
Status: DISCONTINUED | OUTPATIENT
Start: 2023-05-25 | End: 2023-05-26 | Stop reason: HOSPADM

## 2023-05-25 RX ORDER — LACTULOSE 10 G/15ML
SOLUTION ORAL; RECTAL 3 TIMES DAILY
COMMUNITY
End: 2023-05-26

## 2023-05-25 RX ORDER — CILOSTAZOL 100 MG/1
100 TABLET ORAL 2 TIMES DAILY
Status: ON HOLD | COMMUNITY
End: 2023-07-07 | Stop reason: HOSPADM

## 2023-05-25 RX ORDER — INSULIN ASPART 100 [IU]/ML
0-5 INJECTION, SOLUTION INTRAVENOUS; SUBCUTANEOUS
Status: DISCONTINUED | OUTPATIENT
Start: 2023-05-25 | End: 2023-05-26

## 2023-05-25 RX ORDER — ONDANSETRON 2 MG/ML
4 INJECTION INTRAMUSCULAR; INTRAVENOUS EVERY 8 HOURS PRN
Status: DISCONTINUED | OUTPATIENT
Start: 2023-05-25 | End: 2023-05-26 | Stop reason: HOSPADM

## 2023-05-25 RX ORDER — PANTOPRAZOLE SODIUM 40 MG/1
40 TABLET, DELAYED RELEASE ORAL DAILY
Status: DISCONTINUED | OUTPATIENT
Start: 2023-05-26 | End: 2023-05-26 | Stop reason: HOSPADM

## 2023-05-25 RX ORDER — HYDROCODONE BITARTRATE AND ACETAMINOPHEN 5; 325 MG/1; MG/1
1 TABLET ORAL EVERY 6 HOURS PRN
Status: DISCONTINUED | OUTPATIENT
Start: 2023-05-25 | End: 2023-05-26 | Stop reason: HOSPADM

## 2023-05-25 RX ADMIN — CARVEDILOL 12.5 MG: 12.5 TABLET, FILM COATED ORAL at 07:05

## 2023-05-25 RX ADMIN — DOCUSATE SODIUM 100 MG: 100 CAPSULE, LIQUID FILLED ORAL at 09:05

## 2023-05-25 RX ADMIN — BUPROPION HYDROCHLORIDE 150 MG: 150 TABLET, EXTENDED RELEASE ORAL at 09:05

## 2023-05-25 RX ADMIN — CILOSTAZOL 100 MG: 100 TABLET ORAL at 09:05

## 2023-05-25 RX ADMIN — VANCOMYCIN HYDROCHLORIDE 1500 MG: 5 INJECTION, POWDER, LYOPHILIZED, FOR SOLUTION INTRAVENOUS at 05:05

## 2023-05-25 RX ADMIN — LOSARTAN POTASSIUM 100 MG: 100 TABLET, FILM COATED ORAL at 09:05

## 2023-05-25 RX ADMIN — CEFEPIME 1 G: 1 INJECTION, POWDER, FOR SOLUTION INTRAMUSCULAR; INTRAVENOUS at 07:05

## 2023-05-25 RX ADMIN — FERROUS SULFATE TAB EC 325 MG (65 MG FE EQUIVALENT) 1 EACH: 325 (65 FE) TABLET DELAYED RESPONSE at 09:05

## 2023-05-25 RX ADMIN — INSULIN ASPART 1 UNITS: 100 INJECTION, SOLUTION INTRAVENOUS; SUBCUTANEOUS at 09:05

## 2023-05-25 NOTE — Clinical Note
Diagnosis: Osteomyelitis, unspecified site, unspecified type [[id number]]   Admitting Provider:: JACKIE HUTCHINS [[id number]]   Future Attending Provider: JACKIE HUTCHINS [[id number]]   Reason for IP Medical Treatment  (Clinical interventions that can only be accomplished in the IP setting? ) :: life threatening illness   I certify that Inpatient services for greater than or equal to 2 midnights are medically necessary:: Yes   Plans for Post-Acute care--if anticipated (pick the single best option):: A. No post acute care anticipated at this time   Special Needs:: Dialysis - Hemodialysis [12]
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
## 2023-05-25 NOTE — ASSESSMENT & PLAN NOTE
Patient's FSGs are controlled on current medication regimen.  Last A1c reviewed-   Lab Results   Component Value Date    HGBA1C 6.4 02/06/2021     Most recent fingerstick glucose reviewed- No results for input(s): POCTGLUCOSE in the last 24 hours.  Current correctional scale  Low  Maintain anti-hyperglycemic dose as follows-   Antihyperglycemics (From admission, onward)    Start     Stop Route Frequency Ordered    05/26/23 2100  insulin detemir U-100 injection 10 Units         -- SubQ Nightly 05/25/23 1742    05/25/23 1747  insulin aspart U-100 injection 0-5 Units         -- SubQ Before meals & nightly PRN 05/25/23 9966
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
## 2023-05-25 NOTE — PROGRESS NOTES
Pharmacy consulted to assist in vancomycin therapy management for this 37 year-old male with suspected SSTI, goal trough of 10-15. Will begin vancomycin 1500 mg IV x1 to be given today at 1700. Random level ordered for 5/27 AM and will re-dose when <20 Pharmacy will continue to follow daily and make necessary adjustments.    Nikki Wasserman, PharmD  Ext. [phone]

## 2023-05-25 NOTE — ASSESSMENT & PLAN NOTE
-MWF HD with AV fistula left forearm  -patient dialyzes in [place]  -will consult nephrology. Assistance appreciated.  -vera continued. Will obtain renal panel.
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
## 2023-05-25 NOTE — ASSESSMENT & PLAN NOTE
Patient has persistent depression which is moderate and is currently controlled. Will Continue anti-depressant medications. We will not consult psychiatry at this time. Patient does not display psychosis at this time. Continue to monitor closely and adjust plan of care as needed.  -continue home bupropion

## 2023-05-25 NOTE — SUBJECTIVE & OBJECTIVE
No current facility-administered medications on file prior to encounter.     Current Outpatient Medications on File Prior to Encounter   Medication Sig    glucagon (GVOKE HYPOPEN 1-PACK) 1 mg/0.2 mL AtIn     insulin detemir U-100, Levemir, (LEVEMIR FLEXPEN) 100 unit/mL (3 mL) InPn pen     linaCLOtide (LINZESS) 145 mcg Cap capsule     pantoprazole (PROTONIX) 40 MG tablet Take 40mg twice a day for 8 weeks then back to daily.    sevelamer carbonate (RENVELA) 800 mg Tab Take 800 mg by mouth 3 times daily with meals.    amLODIPine (NORVASC) 10 MG tablet Take 10 mg by mouth once daily.    buPROPion (WELLBUTRIN SR) 150 MG TBSR 12 hr tablet Take 150 mg by mouth 2 (two) times daily.    carvediloL (COREG) 25 MG tablet Take 25 mg by mouth 2 (two) times daily with meals.    cilostazoL (PLETAL) 100 MG Tab Take 50 mg by mouth 2 (two) times daily.    docusate sodium (COLACE) 100 MG capsule Take 100 mg by mouth.    ferrous sulfate (FEOSOL) 325 mg (65 mg iron) Tab tablet Take 325 mg by mouth.    lactulose (CHRONULAC) 10 gram/15 mL solution Take by mouth 3 (three) times daily.       Review of patient's allergies indicates:  No Known Allergies    Past Medical History:   Diagnosis Date    Renal disorder      No past surgical history on file.  Family History    None       Tobacco Use    Smoking status: Never    Smokeless tobacco: Never   Substance and Sexual Activity    Alcohol use: Never    Drug use: Never    Sexual activity: Not Currently     Review of Systems   Constitutional:  Negative for fever.   Respiratory: Negative.     Cardiovascular: Negative.    Skin:  Positive for wound.   Neurological: Negative.    Objective:     Vital Signs (Most Recent):  Temp: 98.5 °F (36.9 °C) (05/25/23 1214)  Pulse: 82 (05/25/23 1214)  Resp: 18 (05/25/23 1214)  BP: (!) 148/75 (05/25/23 1214)  SpO2: 96 % (05/25/23 1214) Vital Signs (24h Range):  Temp:  [98.5 °F (36.9 °C)] 98.5 °F (36.9 °C)  Pulse:  [82] 82  Resp:  [18] 18  SpO2:  [96 %] 96 %  BP:  (148)/(75) 148/75     Weight: 77.1 kg (170 lb)  Body mass index is 29.64 kg/m².     Physical Exam  Vitals reviewed.   Constitutional:       General: He is not in acute distress.     Appearance: He is not ill-appearing.   Cardiovascular:      Rate and Rhythm: Normal rate.   Pulmonary:      Effort: Pulmonary effort is normal. No respiratory distress.   Skin:     General: Skin is warm and dry.      Comments: Left foot wound.  See media tab.  Loss of bony rigidity of the left 2nd toe.   Neurological:      Mental Status: He is alert. Mental status is at baseline.          I have reviewed all pertinent lab results within the past 24 hours.  CBC:   Recent Labs   Lab 05/25/23  1250   WBC 8.14   RBC 4.39*   HGB 11.9*   HCT 38.6*      MCV 87.9   MCH 27.1   MCHC 30.8*     CMP:   Recent Labs   Lab 05/25/23  1250   GLU 52*   CALCIUM 9.5   ALBUMIN 3.0*   PROT 8.2      K 4.7   CO2 27   *   BUN 40*   CREATININE 3.84*   ALKPHOS 131*   ALT 19   AST 7*   BILITOT 0.5       Significant Diagnostics:  I have reviewed all pertinent imaging results/findings within the past 24 hours.

## 2023-05-25 NOTE — ASSESSMENT & PLAN NOTE
-wound cx and blood cx X 2 pending  -IV vanc and cefepime empiric therapy renally dosed due to ESRD   -wound care and general surgery consulted. Assistance appreciated.   -Gen surgery would like to obtain a CTA due to concern for vascular dz based on HD schedule.   -CRP elevated. ESR pending.

## 2023-05-26 ENCOUNTER — HOSPITAL ENCOUNTER (INPATIENT)
Facility: HOSPITAL | Age: 38
LOS: 42 days | Discharge: NURSING, REHAB OR LONG-TERM CARE | DRG: 622 | End: 2023-07-07
Attending: HOSPITALIST | Admitting: HOSPITALIST
Payer: MEDICARE

## 2023-05-26 VITALS
OXYGEN SATURATION: 95 % | BODY MASS INDEX: 29.02 KG/M2 | WEIGHT: 170 LBS | DIASTOLIC BLOOD PRESSURE: 83 MMHG | SYSTOLIC BLOOD PRESSURE: 166 MMHG | TEMPERATURE: 98 F | RESPIRATION RATE: 18 BRPM | HEART RATE: 79 BPM | HEIGHT: 64 IN

## 2023-05-26 DIAGNOSIS — E11.621 DIABETIC ULCER OF TOE OF LEFT FOOT ASSOCIATED WITH TYPE 2 DIABETES MELLITUS, WITH BONE INVOLVEMENT WITHOUT EVIDENCE OF NECROSIS: ICD-10-CM

## 2023-05-26 DIAGNOSIS — L89.620 PRESSURE INJURY OF LEFT HEEL, UNSTAGEABLE: ICD-10-CM

## 2023-05-26 DIAGNOSIS — N18.6 TYPE 2 DIABETES MELLITUS WITH CHRONIC KIDNEY DISEASE ON CHRONIC DIALYSIS, WITH LONG-TERM CURRENT USE OF INSULIN: ICD-10-CM

## 2023-05-26 DIAGNOSIS — E11.22 TYPE 2 DIABETES MELLITUS WITH CHRONIC KIDNEY DISEASE ON CHRONIC DIALYSIS, WITH LONG-TERM CURRENT USE OF INSULIN: ICD-10-CM

## 2023-05-26 DIAGNOSIS — M86.9 OSTEOMYELITIS OF LEFT FOOT, UNSPECIFIED TYPE: ICD-10-CM

## 2023-05-26 DIAGNOSIS — N18.6 ESRD (END STAGE RENAL DISEASE): Chronic | ICD-10-CM

## 2023-05-26 DIAGNOSIS — L08.9 WOUND INFECTION: ICD-10-CM

## 2023-05-26 DIAGNOSIS — G82.20 PARAPLEGIA FOLLOWING SPINAL CORD INJURY: Primary | Chronic | ICD-10-CM

## 2023-05-26 DIAGNOSIS — L97.526 DIABETIC ULCER OF TOE OF LEFT FOOT ASSOCIATED WITH TYPE 2 DIABETES MELLITUS, WITH BONE INVOLVEMENT WITHOUT EVIDENCE OF NECROSIS: ICD-10-CM

## 2023-05-26 DIAGNOSIS — I73.9 PVD (PERIPHERAL VASCULAR DISEASE): ICD-10-CM

## 2023-05-26 DIAGNOSIS — R07.9 CHEST PAIN: ICD-10-CM

## 2023-05-26 DIAGNOSIS — Z99.2 TYPE 2 DIABETES MELLITUS WITH CHRONIC KIDNEY DISEASE ON CHRONIC DIALYSIS, WITH LONG-TERM CURRENT USE OF INSULIN: ICD-10-CM

## 2023-05-26 DIAGNOSIS — I10 PRIMARY HYPERTENSION: ICD-10-CM

## 2023-05-26 DIAGNOSIS — Z79.4 TYPE 2 DIABETES MELLITUS WITH CHRONIC KIDNEY DISEASE ON CHRONIC DIALYSIS, WITH LONG-TERM CURRENT USE OF INSULIN: ICD-10-CM

## 2023-05-26 DIAGNOSIS — L89.312 PRESSURE INJURY OF RIGHT BUTTOCK, STAGE 2: ICD-10-CM

## 2023-05-26 DIAGNOSIS — T14.8XXA WOUND INFECTION: ICD-10-CM

## 2023-05-26 DIAGNOSIS — M86.9 OSTEOMYELITIS OF LEFT FOOT: ICD-10-CM

## 2023-05-26 LAB
ANION GAP SERPL CALCULATED.3IONS-SCNC: 9 MMOL/L (ref 7–16)
BASOPHILS # BLD AUTO: 0.05 K/UL (ref 0–0.2)
BASOPHILS NFR BLD AUTO: 0.7 % (ref 0–1)
BASOPHILS NFR BLD MANUAL: 3 % (ref 0–1)
BUN SERPL-MCNC: 52 MG/DL (ref 7–18)
BUN/CREAT SERPL: 12 (ref 6–20)
CALCIUM SERPL-MCNC: 9.1 MG/DL (ref 8.5–10.1)
CHLORIDE SERPL-SCNC: 108 MMOL/L (ref 98–107)
CO2 SERPL-SCNC: 24 MMOL/L (ref 21–32)
CREAT SERPL-MCNC: 4.51 MG/DL (ref 0.7–1.3)
DIFFERENTIAL METHOD BLD: ABNORMAL
EGFR (NO RACE VARIABLE) (RUSH/TITUS): 16 ML/MIN/1.73M2
EOSINOPHIL # BLD AUTO: 0.47 K/UL (ref 0–0.5)
EOSINOPHIL NFR BLD AUTO: 7 % (ref 1–4)
EOSINOPHIL NFR BLD MANUAL: 8 % (ref 1–4)
ERYTHROCYTE [DISTWIDTH] IN BLOOD BY AUTOMATED COUNT: 14.5 % (ref 11.5–14.5)
GLUCOSE SERPL-MCNC: 117 MG/DL (ref 70–105)
GLUCOSE SERPL-MCNC: 118 MG/DL (ref 70–105)
GLUCOSE SERPL-MCNC: 151 MG/DL (ref 70–105)
GLUCOSE SERPL-MCNC: 275 MG/DL (ref 70–105)
GLUCOSE SERPL-MCNC: 285 MG/DL (ref 74–106)
HCT VFR BLD AUTO: 34.9 % (ref 40–54)
HGB BLD-MCNC: 10.8 G/DL (ref 13.5–18)
HYPOCHROMIA BLD QL SMEAR: ABNORMAL
IMM GRANULOCYTES # BLD AUTO: 0.02 K/UL (ref 0–0.04)
IMM GRANULOCYTES NFR BLD: 0.3 % (ref 0–0.4)
LYMPHOCYTES # BLD AUTO: 0.63 K/UL (ref 1–4.8)
LYMPHOCYTES NFR BLD AUTO: 9.3 % (ref 27–41)
LYMPHOCYTES NFR BLD MANUAL: 10 % (ref 27–41)
MCH RBC QN AUTO: 26.9 PG (ref 27–31)
MCHC RBC AUTO-ENTMCNC: 30.9 G/DL (ref 32–36)
MCV RBC AUTO: 86.8 FL (ref 80–96)
MONOCYTES # BLD AUTO: 0.58 K/UL (ref 0–0.8)
MONOCYTES NFR BLD AUTO: 8.6 % (ref 2–6)
MONOCYTES NFR BLD MANUAL: 6 % (ref 2–6)
MPC BLD CALC-MCNC: 10.4 FL (ref 9.4–12.4)
NEUTROPHILS # BLD AUTO: 5.01 K/UL (ref 1.8–7.7)
NEUTROPHILS NFR BLD AUTO: 74.1 % (ref 53–65)
NEUTS SEG NFR BLD MANUAL: 73 % (ref 50–62)
NRBC # BLD AUTO: 0 X10E3/UL
NRBC, AUTO (.00): 0 %
OVALOCYTES BLD QL SMEAR: ABNORMAL
PHOSPHATE SERPL-MCNC: 2.9 MG/DL (ref 2.5–4.5)
PLATELET # BLD AUTO: 237 K/UL (ref 150–400)
PLATELET MORPHOLOGY: NORMAL
POLYCHROMASIA BLD QL SMEAR: ABNORMAL
POTASSIUM SERPL-SCNC: 4.5 MMOL/L (ref 3.5–5.1)
RBC # BLD AUTO: 4.02 M/UL (ref 4.6–6.2)
SODIUM SERPL-SCNC: 136 MMOL/L (ref 136–145)
WBC # BLD AUTO: 6.76 K/UL (ref 4.5–11)

## 2023-05-26 PROCEDURE — 25500020 PHARM REV CODE 255: Performed by: HOSPITALIST

## 2023-05-26 PROCEDURE — 63600175 PHARM REV CODE 636 W HCPCS: Performed by: HOSPITALIST

## 2023-05-26 PROCEDURE — 84100 ASSAY OF PHOSPHORUS: CPT | Performed by: INTERNAL MEDICINE

## 2023-05-26 PROCEDURE — 82962 GLUCOSE BLOOD TEST: CPT

## 2023-05-26 PROCEDURE — 90935 HEMODIALYSIS ONE EVALUATION: CPT

## 2023-05-26 PROCEDURE — 96361 HYDRATE IV INFUSION ADD-ON: CPT

## 2023-05-26 PROCEDURE — G0378 HOSPITAL OBSERVATION PER HR: HCPCS

## 2023-05-26 PROCEDURE — 25000003 PHARM REV CODE 250: Performed by: HOSPITALIST

## 2023-05-26 PROCEDURE — 80048 BASIC METABOLIC PNL TOTAL CA: CPT | Performed by: HOSPITALIST

## 2023-05-26 PROCEDURE — 25000003 PHARM REV CODE 250: Performed by: NURSE PRACTITIONER

## 2023-05-26 PROCEDURE — 97161 PT EVAL LOW COMPLEX 20 MIN: CPT | Mod: KX

## 2023-05-26 PROCEDURE — 97165 OT EVAL LOW COMPLEX 30 MIN: CPT

## 2023-05-26 PROCEDURE — 11000001 HC ACUTE MED/SURG PRIVATE ROOM

## 2023-05-26 PROCEDURE — 99223 PR INITIAL HOSPITAL CARE,LEVL III: ICD-10-PCS | Mod: $0,AI,, | Performed by: HOSPITALIST

## 2023-05-26 PROCEDURE — 96372 THER/PROPH/DIAG INJ SC/IM: CPT | Mod: 59 | Performed by: HOSPITALIST

## 2023-05-26 PROCEDURE — 82947 ASSAY GLUCOSE BLOOD QUANT: CPT

## 2023-05-26 PROCEDURE — 85025 COMPLETE CBC W/AUTO DIFF WBC: CPT | Performed by: HOSPITALIST

## 2023-05-26 PROCEDURE — 25000003 PHARM REV CODE 250: Performed by: INTERNAL MEDICINE

## 2023-05-26 PROCEDURE — G0257 UNSCHED DIALYSIS ESRD PT HOS: HCPCS

## 2023-05-26 PROCEDURE — 96366 THER/PROPH/DIAG IV INF ADDON: CPT

## 2023-05-26 PROCEDURE — 99223 1ST HOSP IP/OBS HIGH 75: CPT | Mod: $0,AI,, | Performed by: HOSPITALIST

## 2023-05-26 PROCEDURE — 63600175 PHARM REV CODE 636 W HCPCS: Performed by: NURSE PRACTITIONER

## 2023-05-26 RX ORDER — LOSARTAN POTASSIUM 100 MG/1
100 TABLET ORAL NIGHTLY
Status: DISCONTINUED | OUTPATIENT
Start: 2023-05-26 | End: 2023-07-07 | Stop reason: HOSPADM

## 2023-05-26 RX ORDER — SODIUM CHLORIDE 0.9 % (FLUSH) 0.9 %
10 SYRINGE (ML) INJECTION EVERY 12 HOURS PRN
Status: DISCONTINUED | OUTPATIENT
Start: 2023-05-26 | End: 2023-07-07 | Stop reason: HOSPADM

## 2023-05-26 RX ORDER — DOCUSATE SODIUM 100 MG/1
100 CAPSULE, LIQUID FILLED ORAL 2 TIMES DAILY
Status: DISCONTINUED | OUTPATIENT
Start: 2023-05-26 | End: 2023-07-07 | Stop reason: HOSPADM

## 2023-05-26 RX ORDER — PANTOPRAZOLE SODIUM 40 MG/1
40 TABLET, DELAYED RELEASE ORAL DAILY
Qty: 30 TABLET | Refills: 11
Start: 2023-05-27 | End: 2024-05-26

## 2023-05-26 RX ORDER — ONDANSETRON 2 MG/ML
4 INJECTION INTRAMUSCULAR; INTRAVENOUS EVERY 8 HOURS PRN
Status: ON HOLD
Start: 2023-05-26 | End: 2023-05-29 | Stop reason: CLARIF

## 2023-05-26 RX ORDER — FERROUS SULFATE 325(65) MG
325 TABLET, DELAYED RELEASE (ENTERIC COATED) ORAL DAILY
Status: ON HOLD
Start: 2023-05-26 | End: 2023-05-29 | Stop reason: CLARIF

## 2023-05-26 RX ORDER — CILOSTAZOL 50 MG/1
50 TABLET ORAL 2 TIMES DAILY
Status: DISCONTINUED | OUTPATIENT
Start: 2023-05-26 | End: 2023-06-06

## 2023-05-26 RX ORDER — TALC
6 POWDER (GRAM) TOPICAL NIGHTLY PRN
Status: DISCONTINUED | OUTPATIENT
Start: 2023-05-26 | End: 2023-07-07 | Stop reason: HOSPADM

## 2023-05-26 RX ORDER — ACETAMINOPHEN 325 MG/1
650 TABLET ORAL EVERY 4 HOURS PRN
Status: DISCONTINUED | OUTPATIENT
Start: 2023-05-26 | End: 2023-07-07 | Stop reason: HOSPADM

## 2023-05-26 RX ORDER — LACTULOSE 10 G/15ML
20 SOLUTION ORAL
Start: 2023-05-27

## 2023-05-26 RX ORDER — SODIUM CHLORIDE 9 MG/ML
INJECTION, SOLUTION INTRAVENOUS
Status: DISCONTINUED | OUTPATIENT
Start: 2023-05-26 | End: 2023-05-26 | Stop reason: HOSPADM

## 2023-05-26 RX ORDER — HYDRALAZINE HYDROCHLORIDE 20 MG/ML
10 INJECTION INTRAMUSCULAR; INTRAVENOUS EVERY 6 HOURS PRN
Status: DISCONTINUED | OUTPATIENT
Start: 2023-05-26 | End: 2023-05-26

## 2023-05-26 RX ORDER — HYDRALAZINE HYDROCHLORIDE 25 MG/1
25 TABLET, FILM COATED ORAL EVERY 8 HOURS PRN
Status: DISCONTINUED | OUTPATIENT
Start: 2023-05-26 | End: 2023-05-26 | Stop reason: HOSPADM

## 2023-05-26 RX ORDER — AMLODIPINE BESYLATE 10 MG/1
10 TABLET ORAL DAILY
Status: DISCONTINUED | OUTPATIENT
Start: 2023-05-27 | End: 2023-07-07 | Stop reason: HOSPADM

## 2023-05-26 RX ORDER — INSULIN ASPART 100 [IU]/ML
0-10 INJECTION, SOLUTION INTRAVENOUS; SUBCUTANEOUS
Status: ON HOLD
Start: 2023-05-26 | End: 2023-05-29 | Stop reason: CLARIF

## 2023-05-26 RX ORDER — IBUPROFEN 200 MG
24 TABLET ORAL
Status: DISCONTINUED | OUTPATIENT
Start: 2023-05-26 | End: 2023-07-07 | Stop reason: HOSPADM

## 2023-05-26 RX ORDER — BUPROPION HYDROCHLORIDE 150 MG/1
150 TABLET, EXTENDED RELEASE ORAL 2 TIMES DAILY
Status: DISCONTINUED | OUTPATIENT
Start: 2023-05-26 | End: 2023-07-07 | Stop reason: HOSPADM

## 2023-05-26 RX ORDER — ACETAMINOPHEN 325 MG/1
650 TABLET ORAL EVERY 4 HOURS PRN
Refills: 0 | Status: ON HOLD
Start: 2023-05-26 | End: 2023-05-29 | Stop reason: CLARIF

## 2023-05-26 RX ORDER — HEPARIN SODIUM 5000 [USP'U]/ML
5000 INJECTION, SOLUTION INTRAVENOUS; SUBCUTANEOUS EVERY 8 HOURS
Status: DISCONTINUED | OUTPATIENT
Start: 2023-05-26 | End: 2023-07-07 | Stop reason: HOSPADM

## 2023-05-26 RX ORDER — GLUCAGON 1 MG
1 KIT INJECTION
Status: DISCONTINUED | OUTPATIENT
Start: 2023-05-26 | End: 2023-06-16

## 2023-05-26 RX ORDER — IBUPROFEN 200 MG
16 TABLET ORAL
Status: DISCONTINUED | OUTPATIENT
Start: 2023-05-26 | End: 2023-07-07 | Stop reason: HOSPADM

## 2023-05-26 RX ORDER — CARVEDILOL 25 MG/1
25 TABLET ORAL 2 TIMES DAILY WITH MEALS
Status: DISCONTINUED | OUTPATIENT
Start: 2023-05-26 | End: 2023-05-26 | Stop reason: HOSPADM

## 2023-05-26 RX ORDER — INSULIN ASPART 100 [IU]/ML
1-10 INJECTION, SOLUTION INTRAVENOUS; SUBCUTANEOUS
Status: DISCONTINUED | OUTPATIENT
Start: 2023-05-26 | End: 2023-06-01

## 2023-05-26 RX ORDER — LACTULOSE 10 G/15ML
20 SOLUTION ORAL 3 TIMES DAILY
Status: DISCONTINUED | OUTPATIENT
Start: 2023-05-26 | End: 2023-07-07 | Stop reason: HOSPADM

## 2023-05-26 RX ORDER — HYDRALAZINE HYDROCHLORIDE 25 MG/1
25 TABLET, FILM COATED ORAL EVERY 8 HOURS PRN
Status: ON HOLD
Start: 2023-05-26 | End: 2023-05-29 | Stop reason: CLARIF

## 2023-05-26 RX ORDER — CARVEDILOL 25 MG/1
25 TABLET ORAL 2 TIMES DAILY WITH MEALS
Status: DISCONTINUED | OUTPATIENT
Start: 2023-05-26 | End: 2023-07-07 | Stop reason: HOSPADM

## 2023-05-26 RX ORDER — TALC
6 POWDER (GRAM) TOPICAL NIGHTLY PRN
Refills: 0 | Status: ON HOLD
Start: 2023-05-26 | End: 2023-05-29 | Stop reason: CLARIF

## 2023-05-26 RX ORDER — ONDANSETRON 2 MG/ML
4 INJECTION INTRAMUSCULAR; INTRAVENOUS EVERY 8 HOURS PRN
Status: DISCONTINUED | OUTPATIENT
Start: 2023-05-26 | End: 2023-07-07 | Stop reason: HOSPADM

## 2023-05-26 RX ORDER — SEVELAMER CARBONATE 800 MG/1
800 TABLET, FILM COATED ORAL
Status: DISCONTINUED | OUTPATIENT
Start: 2023-05-26 | End: 2023-07-07 | Stop reason: HOSPADM

## 2023-05-26 RX ORDER — NALOXONE HCL 0.4 MG/ML
0.02 VIAL (ML) INJECTION
Status: DISCONTINUED | OUTPATIENT
Start: 2023-05-26 | End: 2023-07-07 | Stop reason: HOSPADM

## 2023-05-26 RX ORDER — HYDROCODONE BITARTRATE AND ACETAMINOPHEN 5; 325 MG/1; MG/1
1 TABLET ORAL EVERY 6 HOURS PRN
Status: DISCONTINUED | OUTPATIENT
Start: 2023-05-26 | End: 2023-06-07

## 2023-05-26 RX ORDER — HYDRALAZINE HYDROCHLORIDE 25 MG/1
25 TABLET, FILM COATED ORAL EVERY 8 HOURS PRN
Status: DISCONTINUED | OUTPATIENT
Start: 2023-05-26 | End: 2023-07-07 | Stop reason: HOSPADM

## 2023-05-26 RX ORDER — PANTOPRAZOLE SODIUM 40 MG/1
40 TABLET, DELAYED RELEASE ORAL DAILY
Status: DISCONTINUED | OUTPATIENT
Start: 2023-05-27 | End: 2023-07-07 | Stop reason: HOSPADM

## 2023-05-26 RX ORDER — INSULIN ASPART 100 [IU]/ML
0-10 INJECTION, SOLUTION INTRAVENOUS; SUBCUTANEOUS
Status: DISCONTINUED | OUTPATIENT
Start: 2023-05-26 | End: 2023-05-26 | Stop reason: HOSPADM

## 2023-05-26 RX ORDER — DOCUSATE SODIUM 100 MG/1
100 CAPSULE, LIQUID FILLED ORAL 2 TIMES DAILY
Refills: 0
Start: 2023-05-26

## 2023-05-26 RX ORDER — LANOLIN ALCOHOL/MO/W.PET/CERES
1 CREAM (GRAM) TOPICAL DAILY
Status: DISCONTINUED | OUTPATIENT
Start: 2023-05-27 | End: 2023-07-07 | Stop reason: HOSPADM

## 2023-05-26 RX ORDER — LOSARTAN POTASSIUM 100 MG/1
100 TABLET ORAL NIGHTLY
Qty: 90 TABLET | Refills: 3
Start: 2023-05-26 | End: 2024-05-25

## 2023-05-26 RX ADMIN — CARVEDILOL 25 MG: 25 TABLET, FILM COATED ORAL at 05:05

## 2023-05-26 RX ADMIN — DOCUSATE SODIUM 100 MG: 100 CAPSULE, LIQUID FILLED ORAL at 08:05

## 2023-05-26 RX ADMIN — SEVELAMER CARBONATE 800 MG: 800 TABLET, FILM COATED ORAL at 09:05

## 2023-05-26 RX ADMIN — BUPROPION HYDROCHLORIDE 150 MG: 150 TABLET, FILM COATED, EXTENDED RELEASE ORAL at 08:05

## 2023-05-26 RX ADMIN — SEVELAMER CARBONATE 800 MG: 800 TABLET, FILM COATED ORAL at 06:05

## 2023-05-26 RX ADMIN — INSULIN ASPART 3 UNITS: 100 INJECTION, SOLUTION INTRAVENOUS; SUBCUTANEOUS at 06:05

## 2023-05-26 RX ADMIN — AMLODIPINE BESYLATE 10 MG: 5 TABLET ORAL at 09:05

## 2023-05-26 RX ADMIN — CILOSTAZOL 100 MG: 100 TABLET ORAL at 09:05

## 2023-05-26 RX ADMIN — CEFEPIME 1 G: 1 INJECTION, POWDER, FOR SOLUTION INTRAMUSCULAR; INTRAVENOUS at 06:05

## 2023-05-26 RX ADMIN — LACTULOSE 20 G: 20 SOLUTION ORAL at 08:05

## 2023-05-26 RX ADMIN — CEFEPIME 1 G: 1 INJECTION, POWDER, FOR SOLUTION INTRAMUSCULAR; INTRAVENOUS at 08:05

## 2023-05-26 RX ADMIN — CARVEDILOL 25 MG: 25 TABLET, FILM COATED ORAL at 09:05

## 2023-05-26 RX ADMIN — PANTOPRAZOLE SODIUM 40 MG: 40 TABLET, DELAYED RELEASE ORAL at 09:05

## 2023-05-26 RX ADMIN — FERROUS SULFATE TAB EC 325 MG (65 MG FE EQUIVALENT) 1 EACH: 325 (65 FE) TABLET DELAYED RESPONSE at 09:05

## 2023-05-26 RX ADMIN — IOPAMIDOL 125 ML: 755 INJECTION, SOLUTION INTRAVENOUS at 10:05

## 2023-05-26 RX ADMIN — LOSARTAN POTASSIUM 100 MG: 100 TABLET, FILM COATED ORAL at 08:05

## 2023-05-26 RX ADMIN — INSULIN DETEMIR 10 UNITS: 100 INJECTION, SOLUTION SUBCUTANEOUS at 08:05

## 2023-05-26 RX ADMIN — BUPROPION HYDROCHLORIDE 150 MG: 150 TABLET, EXTENDED RELEASE ORAL at 09:05

## 2023-05-26 RX ADMIN — CILOSTAZOL 50 MG: 50 TABLET ORAL at 08:05

## 2023-05-26 RX ADMIN — HEPARIN SODIUM 5000 UNITS: 5000 INJECTION INTRAVENOUS; SUBCUTANEOUS at 09:05

## 2023-05-26 RX ADMIN — INSULIN ASPART 1 UNITS: 100 INJECTION, SOLUTION INTRAVENOUS; SUBCUTANEOUS at 09:05

## 2023-05-26 RX ADMIN — SODIUM CHLORIDE 1000 ML: 9 INJECTION, SOLUTION INTRAVENOUS at 02:05
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
## 2023-05-26 NOTE — PLAN OF CARE
Problem: Occupational Therapy  Goal: Occupational Therapy Goal  Description: OT evaluation completed. Pt is an eval only. Pt is a NH resident and receive assistance from staff for bathing and dressing. Pt reports being able to feed self and assist with UE bathing. Staff assist with all other self care. Pt is at baseline. No skilled OT indicated.  Outcome: Met

## 2023-05-26 NOTE — ASSESSMENT & PLAN NOTE
1st and 2nd toe Left foot (acute) Osteomyelitis. Complicated by moderate vascular disease of Lower extremities.  CTA Bilateral lower extremities reveals moderate to severe calcified vascular disease affects the arteries of the bilateral lower extremities.    -medical management with Vancomycin and cefepime started on 5/25/23  -wound care consulted  -Wound CX pending  -Blood Cultures pending
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
## 2023-05-26 NOTE — SUBJECTIVE & OBJECTIVE
Interval History:   Stable, no changes overnight.  CTA plan today prior to dialysis.  Consider vascular consult pending results of CTA.  Will likely need revascularization prior to surgery.    Medications:  Continuous Infusions:  Scheduled Meds:   amLODIPine  10 mg Oral Daily    buPROPion  150 mg Oral BID    carvediloL  25 mg Oral BID WM    ceFEPime (MAXIPIME) IVPB  1 g Intravenous Q12H    cilostazoL  100 mg Oral BID    docusate sodium  100 mg Oral BID    ferrous sulfate  1 tablet Oral BID    insulin detemir U-100  10 Units Subcutaneous QHS    [START ON 5/27/2023] lactulose  20 g Oral Every Tues, Thurs, Sat, Sun    losartan  100 mg Oral QHS    pantoprazole  40 mg Oral Daily    sevelamer carbonate  800 mg Oral TIDWM     PRN Meds:acetaminophen, dextrose 50%, dextrose 50%, glucagon (human recombinant), glucose, glucose, hydrALAZINE, HYDROcodone-acetaminophen, insulin aspart U-100, linaCLOtide, melatonin, naloxone, ondansetron, polyethylene glycol, sodium chloride 0.9%, vancomycin - pharmacy to dose     Review of patient's allergies indicates:  No Known Allergies  Objective:     Vital Signs (Most Recent):  Temp: 98.7 °F (37.1 °C) (05/26/23 0435)  Pulse: 85 (05/26/23 0435)  Resp: 17 (05/26/23 0435)  BP: (!) 177/88 (05/26/23 0435)  SpO2: 95 % (05/26/23 0435) Vital Signs (24h Range):  Temp:  [98.5 °F (36.9 °C)-98.7 °F (37.1 °C)] 98.7 °F (37.1 °C)  Pulse:  [81-85] 85  Resp:  [17-18] 17  SpO2:  [94 %-96 %] 95 %  BP: (148-185)/(75-90) 177/88     Weight: 77.1 kg (170 lb)  Body mass index is 29.64 kg/m².    Intake/Output - Last 3 Shifts       None             Physical Exam  Vitals reviewed.   Cardiovascular:      Rate and Rhythm: Normal rate.   Pulmonary:      Effort: Pulmonary effort is normal. No respiratory distress.   Skin:     General: Skin is warm and dry.      Comments: Subacute versus chronic wound of left 1st and 2nd toes.  See media tab.   Neurological:      Mental Status: He is alert. Mental status is at baseline.         Significant Labs:  I have reviewed all pertinent lab results within the past 24 hours.  CBC:   Recent Labs   Lab 05/26/23  0526   WBC 6.76   RBC 4.02*   HGB 10.8*   HCT 34.9*      MCV 86.8   MCH 26.9*   MCHC 30.9*     CMP:   Recent Labs   Lab 05/25/23  1250 05/25/23  1806 05/26/23  0526   GLU 52* 137* 285*   CALCIUM 9.5 9.3 9.1   ALBUMIN 3.0* 2.9*  --    PROT 8.2  --   --     137 136   K 4.7 4.5 4.5   CO2 27 24 24   * 104 108*   BUN 40* 47* 52*   CREATININE 3.84* 4.17* 4.51*   ALKPHOS 131*  --   --    ALT 19  --   --    AST 7*  --   --    BILITOT 0.5  --   --        Significant Diagnostics:  I have reviewed all pertinent imaging results/findings within the past 24 hours.  CTA pending
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
## 2023-05-26 NOTE — PROGRESS NOTES
Pharmacokinetic Initial Assessment: IV Vancomycin    Assessment/Plan:    Pt received a dose of vanc on 5/25 at [facility].  Pt is a dialysis pt.  A random vanc level has been ordered for 5/29 at 0600.  Pharmacy will redose vanc based on this level.    Please contact pharmacy at extension [phone] with any questions regarding this assessment.     Patient brief summary:  Lee Escobar is a 37 y.o. male initiated on antimicrobial therapy with IV Vancomycin for treatment of suspected bone/joint infection    Drug Allergies:   Review of patient's allergies indicates:  No Known Allergies    Actual Body Weight:   Not charted yet    Renal Function:   CrCl cannot be calculated (Unknown ideal weight.).,     Dialysis Method (if applicable):  intermittent HD    CBC (last 72 hours):  Recent Labs   Lab Result Units 05/25/23  1250 05/26/23  0526   WBC K/uL 8.14 6.76   Hemoglobin g/dL 11.9* 10.8*   Hematocrit % 38.6* 34.9*   Platelet Count K/uL 276 237   Lymphocytes % % 11.2* 9.3*   Lymphocytes, Man % %  --  10*   Monocytes % % 9.1* 8.6*   Monocytes, Man % %  --  6   Eosinophils % % 8.0* 7.0*   Eosinophils, Man % %  --  8*   Basophils % % 0.7 0.7   Basophils, Man % %  --  3*       Metabolic Panel (last 72 hours):  Recent Labs   Lab Result Units 05/25/23  1250 05/25/23  1806 05/26/23  0526   Sodium mmol/L 137 137 136   Potassium mmol/L 4.7 4.5 4.5   Chloride mmol/L 108* 104 108*   CO2 mmol/L 27 24 24   Glucose mg/dL 52* 137* 285*   BUN mg/dL 40* 47* 52*   Creatinine mg/dL 3.84* 4.17* 4.51*   Albumin g/dL 3.0* 2.9*  --    Bilirubin, Total mg/dL 0.5  --   --    Alk Phos U/L 131*  --   --    AST U/L 7*  --   --    ALT U/L 19  --   --    Phosphorus mg/dL  --  3.4 2.9       Drug levels (last 3 results):  No results for input(s): VANCOMYCINRA, VANCORANDOM, VANCOMYCINPE, VANCOPEAK, VANCOMYCINTR, VANCOTROUGH in the last 72 hours.    Microbiologic Results:  Microbiology Results (last 7 days)       ** No results found for the last 168 hours. **

## 2023-05-26 NOTE — DIALYSIS ROUNDING
The patient was dialyzed today without complication    PE  Lungs-crackles  Cor-2/6 systolic murmur no rub  Abd-soft    Plan:  Continue the present care

## 2023-05-26 NOTE — ASSESSMENT & PLAN NOTE
Patient's FSGs are controlled on current medication regimen.  Last A1c reviewed-   Lab Results   Component Value Date    HGBA1C 6.4 02/06/2021     Most recent fingerstick glucose reviewed- No results for input(s): POCTGLUCOSE in the last 24 hours.  Current correctional scale  Low  Maintain anti-hyperglycemic dose as follows-   Antihyperglycemics (From admission, onward)    Start     Stop Route Frequency Ordered    05/26/23 2100  insulin detemir U-100 injection 10 Units         -- SubQ Nightly 05/25/23 1742    05/26/23 0831  insulin aspart U-100 injection 0-10 Units         -- SubQ Before meals & nightly PRN 05/26/23 0831
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
## 2023-05-26 NOTE — PLAN OF CARE
Problem: Physical Therapy  Goal: Physical Therapy Goal  Description: PT evaluation completed. Pt is currently already at baseline with mobility. No further PT warranted.   Outcome: Met

## 2023-05-26 NOTE — DIALYSIS ROUNDING
Removed 1 liter of fluid.   Bfr 400, Dialsate 600,  Starting weight 77.5kg   Ending weight 75.1 kg

## 2023-05-26 NOTE — PROGRESS NOTES
05/26/23 0826   Wound Care Follow Up   Wound Care Follow-up? No     Surgery following per COLTEN Danielle note 05/25

## 2023-05-26 NOTE — ASSESSMENT & PLAN NOTE
Patient's FSGs are controlled on current medication regimen.  Last A1c reviewed-   Lab Results   Component Value Date    HGBA1C 6.4 02/06/2021     Most recent fingerstick glucose reviewed- No results for input(s): POCTGLUCOSE in the last 24 hours.  Current correctional scale  Medium  Maintain anti-hyperglycemic dose as follows-   Antihyperglycemics (From admission, onward)    None        Hold Oral hypoglycemics while patient is in the hospital.

## 2023-05-26 NOTE — ASSESSMENT & PLAN NOTE
-wound cx and blood cx X 2 pending  -IV vanc and cefepime empiric therapy renally dosed due to ESRD    -CRP elevated. ESR pending.   5/26/23 CTA Bilateral lower extremities reveals Moderate to severe calcified vascular disease affects the arteries of the bilateral lower extremities.  Patient will be transferred to [facility] ([facility]) for ABX therapy

## 2023-05-26 NOTE — HOSPITAL COURSE
Patient was admitted for Osteomyelitis of 1st and 2nd Toe Left Foot. He was treated with Vancomycin and Cefepime . Surgery was consulted and recommended medical management.  A CTA Bilateral lower extremities revealing moderate to severe calcified vascular disease affects of the arteries of the bilateral lower extremities. Patient has received maximum benefit of hospital stay and will be transferred to [facility] ([facility]) for further management.

## 2023-05-26 NOTE — PLAN OF CARE
[facility]  Discharge Final Note    Primary Care Provider: Maria Elena Solorio II, MD    Expected Discharge Date:     Final Discharge Note (most recent)       Final Note - 05/26/23 1248          Final Note    Assessment Type Final Discharge Note     Anticipated Discharge Disposition Long Term Acute Care        Post-Acute Status    Post-Acute Authorization Placement     Post-Acute Placement Status Set-up Complete/Auth obtained     Patient choice form signed by patient/caregiver List with quality metrics by geographic area provided;List from CMS Compare;List from System Post-Acute Care     Discharge Delays None known at this time                     Important Message from Medicare             After-discharge care                Destination       [facility]   Service: Nursing Home    [address] [place] [id number]   Phone: [phone]                       [facility]  Initial Discharge Assessment       Primary Care Provider: Maria Elena Solorio II, MD    Admission Diagnosis: Wound infection [T14.8XXA, L08.9]  Osteomyelitis, unspecified site, unspecified type [M86.9]    Admission Date: 5/25/2023  Expected Discharge Date:     Transition of Care Barriers: None    Payor: MEDICARE / Plan: MEDICARE PART A & B / Product Type: Government /     Extended Emergency Contact Information  Primary Emergency Contact: Michael Escobar  Mobile Phone: [phone]  Relation: Father  Preferred language: English   needed? No  Secondary Emergency Contact: Samantha Escobar  Mobile Phone: [phone]  Relation: Sister  Preferred language: English   needed? No    Discharge Plan A: Return to nursing home  Discharge Plan B: Return to Nursing Home    No Pharmacies Listed    Initial Assessment (most recent)       Adult Discharge Assessment - 05/26/23 1241          Discharge Assessment    Assessment Type Discharge Planning Assessment      Confirmed/corrected address, phone number and insurance Yes     Confirmed Demographics Correct on Facesheet     Source of Information patient     Does patient/caregiver understand observation status Yes     Communicated ASHTYN with patient/caregiver Date not available/Unable to determine     People in Home facility resident     [facility]ility [facility]     Do you expect to return to your current living situation? Yes     Do you have help at home or someone to help you manage your care at home? Yes     Who are your caregiver(s) and their phone number(s)? facility     Prior to hospitilization cognitive status: Unable to Assess     Current cognitive status: Alert/Oriented     Home Accessibility wheelchair accessible     Home Layout Able to live on 1st floor     Equipment Currently Used at Home wheelchair     Readmission within 30 days? No     Patient currently being followed by outpatient case management? No     Do you currently have service(s) that help you manage your care at home? No     Do you take prescription medications? Yes     Do you have prescription coverage? Yes     Do you have any problems affording any of your prescribed medications? No     Is the patient taking medications as prescribed? yes     Who is going to help you get home at discharge? [facility]     How do you get to doctors appointments? other (see comments)   [facility]    Are you on dialysis? Yes     Dialysis Name and Scheduled days [facility] MWF     Do you take coumadin? No     Discharge Plan A Return to nursing home     Discharge Plan B Return to Nursing Home     DME Needed Upon Discharge  none     Discharge Plan discussed with: Patient     Transition of Care Barriers None        Physical Activity    On average, how many days per week do you engage in moderate to strenuous exercise (like a brisk walk)? 7 days   PT    On average, how many minutes do you engage in exercise at this level? 30 min        Financial Resource  Strain    How hard is it for you to pay for the very basics like food, housing, medical care, and heating? Not hard at all        Housing Stability    In the last 12 months, was there a time when you were not able to pay the mortgage or rent on time? No     In the last 12 months, how many places have you lived? 1     In the last 12 months, was there a time when you did not have a steady place to sleep or slept in a shelter (including now)? No        Transportation Needs    In the past 12 months, has lack of transportation kept you from medical appointments or from getting medications? No     In the past 12 months, has lack of transportation kept you from meetings, work, or from getting things needed for daily living? No        Food Insecurity    Within the past 12 months, you worried that your food would run out before you got the money to buy more. Never true     Within the past 12 months, the food you bought just didn't last and you didn't have money to get more. Never true        Stress    Do you feel stress - tense, restless, nervous, or anxious, or unable to sleep at night because your mind is troubled all the time - these days? Not at all        Social Connections    In a typical week, how many times do you talk on the phone with family, friends, or neighbors? More than three times a week     How often do you get together with friends or relatives? More than three times a week     How often do you attend Denominational or Catholic services? Never     Do you belong to any clubs or organizations such as Denominational groups, unions, fraternal or athletic groups, or school groups? No     How often do you attend meetings of the clubs or organizations you belong to? Never     Are you , , , , never , or living with a partner? Never         Alcohol Use    Q1: How often do you have a drink containing alcohol? Never     Q2: How many drinks containing alcohol do you have on a typical day  when you are drinking? Patient does not drink     Q3: How often do you have six or more drinks on one occasion? Never                   Spoke with pt in room. Pt is resident at [facility] choice obtained to return when medically stable. Pt has HD MWF at [facility]. Choice to remain there. Will follow dc needs as arise.   1200  Rc'd choice for pt for [facility], as he will need to have more time in hospital. Pt will go to room 131, MD Arizmendi and David aware awaiting dc orders. No further dc needs.
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
## 2023-05-26 NOTE — ASSESSMENT & PLAN NOTE
Patient has persistent depression which is moderate and is currently controlled. Will Continue anti-depressant medications. We will not consult psychiatry at this time. Patient does not display psychosis at this time. Continue to monitor closely and adjust plan of care as needed.  Continue home antidepressant.
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
## 2023-05-26 NOTE — CONSULTS
Thank you for consult. Per COLTEN Danielle, General Surg to follow.  Will sign off.  Please consult again for other needs.

## 2023-05-27 LAB
GLUCOSE SERPL-MCNC: 120 MG/DL (ref 70–105)
GLUCOSE SERPL-MCNC: 249 MG/DL (ref 70–105)
GLUCOSE SERPL-MCNC: 324 MG/DL (ref 70–105)
HAV IGM SER QL: NORMAL
HBV CORE IGM SER QL: NORMAL
HBV SURFACE AG SERPL QL IA: NORMAL
HCV AB SER QL: NORMAL

## 2023-05-27 PROCEDURE — 11000001 HC ACUTE MED/SURG PRIVATE ROOM

## 2023-05-27 PROCEDURE — 63600175 PHARM REV CODE 636 W HCPCS: Performed by: NURSE PRACTITIONER

## 2023-05-27 PROCEDURE — 99233 SBSQ HOSP IP/OBS HIGH 50: CPT | Mod: ,,, | Performed by: HOSPITALIST

## 2023-05-27 PROCEDURE — 80074 ACUTE HEPATITIS PANEL: CPT | Performed by: INTERNAL MEDICINE

## 2023-05-27 PROCEDURE — 90935 HEMODIALYSIS ONE EVALUATION: CPT

## 2023-05-27 PROCEDURE — 82962 GLUCOSE BLOOD TEST: CPT

## 2023-05-27 PROCEDURE — 99233 PR SUBSEQUENT HOSPITAL CARE,LEVL III: ICD-10-PCS | Mod: ,,, | Performed by: HOSPITALIST

## 2023-05-27 PROCEDURE — 25000003 PHARM REV CODE 250: Performed by: NURSE PRACTITIONER

## 2023-05-27 RX ADMIN — CILOSTAZOL 50 MG: 50 TABLET ORAL at 08:05

## 2023-05-27 RX ADMIN — CARVEDILOL 25 MG: 25 TABLET, FILM COATED ORAL at 08:05

## 2023-05-27 RX ADMIN — INSULIN DETEMIR 10 UNITS: 100 INJECTION, SOLUTION SUBCUTANEOUS at 08:05

## 2023-05-27 RX ADMIN — HEPARIN SODIUM 5000 UNITS: 5000 INJECTION INTRAVENOUS; SUBCUTANEOUS at 02:05

## 2023-05-27 RX ADMIN — LOSARTAN POTASSIUM 100 MG: 100 TABLET, FILM COATED ORAL at 08:05

## 2023-05-27 RX ADMIN — INSULIN ASPART 4 UNITS: 100 INJECTION, SOLUTION INTRAVENOUS; SUBCUTANEOUS at 08:05

## 2023-05-27 RX ADMIN — CEFEPIME 1 G: 1 INJECTION, POWDER, FOR SOLUTION INTRAMUSCULAR; INTRAVENOUS at 08:05

## 2023-05-27 RX ADMIN — HEPARIN SODIUM 5000 UNITS: 5000 INJECTION INTRAVENOUS; SUBCUTANEOUS at 09:05

## 2023-05-27 RX ADMIN — BUPROPION HYDROCHLORIDE 150 MG: 150 TABLET, FILM COATED, EXTENDED RELEASE ORAL at 08:05

## 2023-05-27 RX ADMIN — CARVEDILOL 25 MG: 25 TABLET, FILM COATED ORAL at 05:05

## 2023-05-27 RX ADMIN — SEVELAMER CARBONATE 800 MG: 800 TABLET, FILM COATED ORAL at 05:05

## 2023-05-27 RX ADMIN — DOCUSATE SODIUM 100 MG: 100 CAPSULE, LIQUID FILLED ORAL at 08:05

## 2023-05-27 RX ADMIN — AMLODIPINE BESYLATE 10 MG: 10 TABLET ORAL at 08:05

## 2023-05-27 RX ADMIN — HEPARIN SODIUM 5000 UNITS: 5000 INJECTION INTRAVENOUS; SUBCUTANEOUS at 05:05

## 2023-05-27 RX ADMIN — PANTOPRAZOLE SODIUM 40 MG: 40 TABLET, DELAYED RELEASE ORAL at 08:05

## 2023-05-27 RX ADMIN — FERROUS SULFATE TAB 325 MG (65 MG ELEMENTAL FE) 1 EACH: 325 (65 FE) TAB at 08:05

## 2023-05-27 RX ADMIN — SEVELAMER CARBONATE 800 MG: 800 TABLET, FILM COATED ORAL at 06:05
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
## 2023-05-27 NOTE — PROGRESS NOTES
Patient is seen on hemodialysis, He is tolerating this well, we will continue his treatment unchanged.        Assessment/plan #1.  ESRD-continue HD support  2.  Osteomyelitis-continue present antibiotics and wound care  3.  Secondary hyperparathyroidism-continue Renvela  4.  Hypertension-continue present antihypertensives  5.  Diabetes mellitus-continue present hypoglycemic regimen  6.  Anemia-this is mild the patient's hematocrit is 35% continue to monitor
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
## 2023-05-27 NOTE — SUBJECTIVE & OBJECTIVE
Interval History:     Review of Systems   Constitutional:  Negative for chills and fever.   HENT:  Negative for congestion, hearing loss and trouble swallowing.    Eyes:  Negative for visual disturbance.   Respiratory:  Negative for cough and shortness of breath.    Cardiovascular:  Negative for chest pain, palpitations and leg swelling.   Gastrointestinal:  Negative for abdominal pain, blood in stool, diarrhea, nausea and vomiting.   Genitourinary:  Negative for difficulty urinating and hematuria.   Musculoskeletal:  Negative for back pain and myalgias.   Skin:  Positive for wound (left foot). Negative for rash.   Neurological:  Negative for dizziness, light-headedness and headaches.   Psychiatric/Behavioral:  Negative for sleep disturbance. The patient is not nervous/anxious.    Objective:     Vital Signs (Most Recent):  Temp: 98.4 °F (36.9 °C) (05/27/23 1600)  Pulse: 84 (05/27/23 1600)  Resp: 20 (05/27/23 1600)  BP: (!) 161/81 (05/27/23 1600)  SpO2: 95 % (05/27/23 1600) Vital Signs (24h Range):  Temp:  [98.3 °F (36.8 °C)-98.9 °F (37.2 °C)] 98.4 °F (36.9 °C)  Pulse:  [78-86] 84  Resp:  [17-20] 20  SpO2:  [95 %-97 %] 95 %  BP: (161-179)/(78-97) 161/81     Weight: 76.9 kg (169 lb 8.5 oz)  Body mass index is 30.03 kg/m².    Intake/Output Summary (Last 24 hours) at 5/27/2023 1828  Last data filed at 5/27/2023 1747  Gross per 24 hour   Intake 1290 ml   Output 3000 ml   Net -1710 ml         Physical Exam  Constitutional:       General: He is not in acute distress.     Appearance: Normal appearance. He is normal weight. He is not toxic-appearing.   HENT:      Head: Normocephalic and atraumatic.      Right Ear: External ear normal.      Left Ear: External ear normal.      Nose: Nose normal.      Mouth/Throat:      Mouth: Mucous membranes are moist.      Pharynx: Oropharynx is clear.   Eyes:      Extraocular Movements: Extraocular movements intact.      Conjunctiva/sclera: Conjunctivae normal.   Cardiovascular:      Rate  and Rhythm: Normal rate and regular rhythm.      Pulses: Normal pulses.      Heart sounds: Normal heart sounds. No murmur heard.  Pulmonary:      Effort: Pulmonary effort is normal.      Breath sounds: Normal breath sounds.   Abdominal:      General: Bowel sounds are normal. There is no distension.      Palpations: Abdomen is soft.      Tenderness: There is no abdominal tenderness.   Musculoskeletal:         General: Signs of injury (1st and 2nd digit trauma with infection, odor, and displacement of the nail) present. No swelling or tenderness.      Cervical back: Normal range of motion and neck supple.      Right lower leg: No edema.      Left lower leg: No edema.      Comments: HD fistula left forearm   Neurological:      Mental Status: He is alert and oriented to person, place, and time. Mental status is at baseline.   Psychiatric:         Mood and Affect: Mood normal.         Behavior: Behavior normal.         Thought Content: Thought content normal.         Judgment: Judgment normal.           Significant Labs: All pertinent labs within the past 24 hours have been reviewed.    Significant Imaging: I have reviewed all pertinent imaging results/findings within the past 24 hours.

## 2023-05-28 LAB
GLUCOSE SERPL-MCNC: 192 MG/DL (ref 70–105)
GLUCOSE SERPL-MCNC: 220 MG/DL (ref 70–105)
GLUCOSE SERPL-MCNC: 262 MG/DL (ref 70–105)
GLUCOSE SERPL-MCNC: 63 MG/DL (ref 70–105)
GLUCOSE SERPL-MCNC: 66 MG/DL (ref 70–105)
MICROORGANISM SPEC CULT: ABNORMAL
MICROORGANISM SPEC CULT: ABNORMAL

## 2023-05-28 PROCEDURE — 99233 SBSQ HOSP IP/OBS HIGH 50: CPT | Mod: ,,, | Performed by: HOSPITALIST

## 2023-05-28 PROCEDURE — 63600175 PHARM REV CODE 636 W HCPCS: Performed by: NURSE PRACTITIONER

## 2023-05-28 PROCEDURE — 99233 PR SUBSEQUENT HOSPITAL CARE,LEVL III: ICD-10-PCS | Mod: ,,, | Performed by: HOSPITALIST

## 2023-05-28 PROCEDURE — 25000003 PHARM REV CODE 250: Performed by: NURSE PRACTITIONER

## 2023-05-28 PROCEDURE — 11000001 HC ACUTE MED/SURG PRIVATE ROOM

## 2023-05-28 PROCEDURE — 82962 GLUCOSE BLOOD TEST: CPT

## 2023-05-28 RX ADMIN — CARVEDILOL 25 MG: 25 TABLET, FILM COATED ORAL at 08:05

## 2023-05-28 RX ADMIN — CARVEDILOL 25 MG: 25 TABLET, FILM COATED ORAL at 05:05

## 2023-05-28 RX ADMIN — AMLODIPINE BESYLATE 10 MG: 10 TABLET ORAL at 08:05

## 2023-05-28 RX ADMIN — HEPARIN SODIUM 5000 UNITS: 5000 INJECTION INTRAVENOUS; SUBCUTANEOUS at 02:05

## 2023-05-28 RX ADMIN — CILOSTAZOL 50 MG: 50 TABLET ORAL at 08:05

## 2023-05-28 RX ADMIN — HEPARIN SODIUM 5000 UNITS: 5000 INJECTION INTRAVENOUS; SUBCUTANEOUS at 05:05

## 2023-05-28 RX ADMIN — LOSARTAN POTASSIUM 100 MG: 100 TABLET, FILM COATED ORAL at 09:05

## 2023-05-28 RX ADMIN — SEVELAMER CARBONATE 800 MG: 800 TABLET, FILM COATED ORAL at 11:05

## 2023-05-28 RX ADMIN — CILOSTAZOL 50 MG: 50 TABLET ORAL at 09:05

## 2023-05-28 RX ADMIN — DOCUSATE SODIUM 100 MG: 100 CAPSULE, LIQUID FILLED ORAL at 08:05

## 2023-05-28 RX ADMIN — INSULIN ASPART 3 UNITS: 100 INJECTION, SOLUTION INTRAVENOUS; SUBCUTANEOUS at 09:05

## 2023-05-28 RX ADMIN — BUPROPION HYDROCHLORIDE 150 MG: 150 TABLET, FILM COATED, EXTENDED RELEASE ORAL at 08:05

## 2023-05-28 RX ADMIN — HEPARIN SODIUM 5000 UNITS: 5000 INJECTION INTRAVENOUS; SUBCUTANEOUS at 09:05

## 2023-05-28 RX ADMIN — SEVELAMER CARBONATE 800 MG: 800 TABLET, FILM COATED ORAL at 06:05

## 2023-05-28 RX ADMIN — FERROUS SULFATE TAB 325 MG (65 MG ELEMENTAL FE) 1 EACH: 325 (65 FE) TAB at 08:05

## 2023-05-28 RX ADMIN — DOCUSATE SODIUM 100 MG: 100 CAPSULE, LIQUID FILLED ORAL at 09:05

## 2023-05-28 RX ADMIN — CEFEPIME 1 G: 1 INJECTION, POWDER, FOR SOLUTION INTRAMUSCULAR; INTRAVENOUS at 09:05

## 2023-05-28 RX ADMIN — INSULIN DETEMIR 10 UNITS: 100 INJECTION, SOLUTION SUBCUTANEOUS at 09:05

## 2023-05-28 RX ADMIN — INSULIN ASPART 4 UNITS: 100 INJECTION, SOLUTION INTRAVENOUS; SUBCUTANEOUS at 11:05

## 2023-05-28 RX ADMIN — SEVELAMER CARBONATE 800 MG: 800 TABLET, FILM COATED ORAL at 05:05

## 2023-05-28 RX ADMIN — BUPROPION HYDROCHLORIDE 150 MG: 150 TABLET, FILM COATED, EXTENDED RELEASE ORAL at 09:05

## 2023-05-28 RX ADMIN — PANTOPRAZOLE SODIUM 40 MG: 40 TABLET, DELAYED RELEASE ORAL at 08:05

## 2023-05-28 NOTE — PROGRESS NOTES
Patient denies shortness of breath.  Review of systems GI he denies nausea or vomiting    Physical exam general patient in no acute distress      Assessment/plan #1.  ESRD-continue HD support  2.  Osteomyelitis-continue present antibiotics and wound care  3.  Secondary hyperparathyroidism-continue Renvela  4.  Hypertension-continue present antihypertensives  5.  Diabetes mellitus-continue present hypoglycemic regimen, glucose was 220 today  6.  Anemia-this is mild the patient's hematocrit is 35% continue to monitor

## 2023-05-28 NOTE — PLAN OF CARE
Problem: Adult Inpatient Plan of Care  Goal: Plan of Care Review  Outcome: Ongoing, Progressing  Goal: Patient-Specific Goal (Individualized)  Outcome: Ongoing, Progressing  Goal: Absence of Hospital-Acquired Illness or Injury  Outcome: Ongoing, Progressing  Goal: Optimal Comfort and Wellbeing  Outcome: Ongoing, Progressing  Goal: Readiness for Transition of Care  Outcome: Ongoing, Progressing     Problem: Diabetes Comorbidity  Goal: Blood Glucose Level Within Targeted Range  Outcome: Ongoing, Progressing     Problem: Impaired Wound Healing  Goal: Optimal Wound Healing  Outcome: Ongoing, Progressing     Problem: Fall Injury Risk  Goal: Absence of Fall and Fall-Related Injury  Outcome: Ongoing, Progressing     Problem: Infection  Goal: Absence of Infection Signs and Symptoms  Outcome: Ongoing, Progressing     Problem: Skin Injury Risk Increased  Goal: Skin Health and Integrity  Outcome: Ongoing, Progressing     Problem: Device-Related Complication Risk (Hemodialysis)  Goal: Safe, Effective Therapy Delivery  Outcome: Ongoing, Progressing     Problem: Hemodynamic Instability (Hemodialysis)  Goal: Effective Tissue Perfusion  Outcome: Ongoing, Progressing     Problem: Infection (Hemodialysis)  Goal: Absence of Infection Signs and Symptoms  Outcome: Ongoing, Progressing

## 2023-05-29 LAB
GLUCOSE SERPL-MCNC: 100 MG/DL (ref 70–105)
GLUCOSE SERPL-MCNC: 158 MG/DL (ref 70–105)
GLUCOSE SERPL-MCNC: 182 MG/DL (ref 70–105)
GLUCOSE SERPL-MCNC: 322 MG/DL (ref 70–105)
GLUCOSE SERPL-MCNC: 97 MG/DL (ref 70–105)
VANCOMYCIN SERPL-MCNC: 11.7 ΜG/ML (ref 0–20)

## 2023-05-29 PROCEDURE — 63600175 PHARM REV CODE 636 W HCPCS: Performed by: HOSPITALIST

## 2023-05-29 PROCEDURE — 90935 HEMODIALYSIS ONE EVALUATION: CPT

## 2023-05-29 PROCEDURE — 63600175 PHARM REV CODE 636 W HCPCS: Performed by: NURSE PRACTITIONER

## 2023-05-29 PROCEDURE — 80100014 HC HEMODIALYSIS 1:1

## 2023-05-29 PROCEDURE — 11000001 HC ACUTE MED/SURG PRIVATE ROOM

## 2023-05-29 PROCEDURE — 99233 SBSQ HOSP IP/OBS HIGH 50: CPT | Mod: ,,, | Performed by: HOSPITALIST

## 2023-05-29 PROCEDURE — A6212 FOAM DRG <=16 SQ IN W/BORDER: HCPCS

## 2023-05-29 PROCEDURE — 82962 GLUCOSE BLOOD TEST: CPT

## 2023-05-29 PROCEDURE — 25000003 PHARM REV CODE 250: Performed by: HOSPITALIST

## 2023-05-29 PROCEDURE — 99233 PR SUBSEQUENT HOSPITAL CARE,LEVL III: ICD-10-PCS | Mod: ,,, | Performed by: HOSPITALIST

## 2023-05-29 PROCEDURE — 80202 ASSAY OF VANCOMYCIN: CPT | Performed by: HOSPITALIST

## 2023-05-29 PROCEDURE — 25000003 PHARM REV CODE 250: Performed by: NURSE PRACTITIONER

## 2023-05-29 RX ORDER — MUPIROCIN 20 MG/G
OINTMENT TOPICAL 2 TIMES DAILY
Status: DISPENSED | OUTPATIENT
Start: 2023-05-29 | End: 2023-06-03

## 2023-05-29 RX ORDER — INSULIN DETEMIR 100 [IU]/ML
15 INJECTION, SOLUTION SUBCUTANEOUS NIGHTLY
Status: ON HOLD | COMMUNITY
End: 2023-07-07 | Stop reason: HOSPADM

## 2023-05-29 RX ORDER — FERROUS SULFATE 325(65) MG
325 TABLET ORAL 2 TIMES DAILY WITH MEALS
Status: ON HOLD | COMMUNITY
End: 2023-07-07 | Stop reason: HOSPADM

## 2023-05-29 RX ORDER — ESCITALOPRAM OXALATE 20 MG/1
20 TABLET ORAL DAILY
COMMUNITY

## 2023-05-29 RX ORDER — ADHESIVE BANDAGE
30 BANDAGE TOPICAL DAILY PRN
COMMUNITY

## 2023-05-29 RX ORDER — DEXTROSE 40 %
15 GEL (GRAM) ORAL
COMMUNITY

## 2023-05-29 RX ORDER — LACTULOSE 10 G/15ML
20 SOLUTION ORAL; RECTAL DAILY PRN
Status: ON HOLD | COMMUNITY
End: 2023-07-07 | Stop reason: HOSPADM

## 2023-05-29 RX ORDER — ACETAMINOPHEN 325 MG/1
650 TABLET ORAL EVERY 8 HOURS PRN
COMMUNITY

## 2023-05-29 RX ORDER — GLUCAGON INJECTION, SOLUTION 1 MG/.2ML
1 INJECTION, SOLUTION SUBCUTANEOUS
COMMUNITY

## 2023-05-29 RX ADMIN — CARVEDILOL 25 MG: 25 TABLET, FILM COATED ORAL at 04:05

## 2023-05-29 RX ADMIN — LACTULOSE 20 G: 20 SOLUTION ORAL at 09:05

## 2023-05-29 RX ADMIN — HEPARIN SODIUM 5000 UNITS: 5000 INJECTION INTRAVENOUS; SUBCUTANEOUS at 10:05

## 2023-05-29 RX ADMIN — PANTOPRAZOLE SODIUM 40 MG: 40 TABLET, DELAYED RELEASE ORAL at 09:05

## 2023-05-29 RX ADMIN — CEFEPIME 1 G: 1 INJECTION, POWDER, FOR SOLUTION INTRAMUSCULAR; INTRAVENOUS at 08:05

## 2023-05-29 RX ADMIN — BUPROPION HYDROCHLORIDE 150 MG: 150 TABLET, FILM COATED, EXTENDED RELEASE ORAL at 08:05

## 2023-05-29 RX ADMIN — SEVELAMER CARBONATE 800 MG: 800 TABLET, FILM COATED ORAL at 04:05

## 2023-05-29 RX ADMIN — FERROUS SULFATE TAB 325 MG (65 MG ELEMENTAL FE) 1 EACH: 325 (65 FE) TAB at 09:05

## 2023-05-29 RX ADMIN — CILOSTAZOL 50 MG: 50 TABLET ORAL at 09:05

## 2023-05-29 RX ADMIN — DOCUSATE SODIUM 100 MG: 100 CAPSULE, LIQUID FILLED ORAL at 08:05

## 2023-05-29 RX ADMIN — INSULIN ASPART 4 UNITS: 100 INJECTION, SOLUTION INTRAVENOUS; SUBCUTANEOUS at 08:05

## 2023-05-29 RX ADMIN — BUPROPION HYDROCHLORIDE 150 MG: 150 TABLET, FILM COATED, EXTENDED RELEASE ORAL at 09:05

## 2023-05-29 RX ADMIN — CILOSTAZOL 50 MG: 50 TABLET ORAL at 08:05

## 2023-05-29 RX ADMIN — MUPIROCIN: 20 OINTMENT TOPICAL at 11:05

## 2023-05-29 RX ADMIN — AMLODIPINE BESYLATE 10 MG: 10 TABLET ORAL at 09:05

## 2023-05-29 RX ADMIN — DOCUSATE SODIUM 100 MG: 100 CAPSULE, LIQUID FILLED ORAL at 09:05

## 2023-05-29 RX ADMIN — HEPARIN SODIUM 5000 UNITS: 5000 INJECTION INTRAVENOUS; SUBCUTANEOUS at 02:05

## 2023-05-29 RX ADMIN — INSULIN DETEMIR 10 UNITS: 100 INJECTION, SOLUTION SUBCUTANEOUS at 08:05

## 2023-05-29 RX ADMIN — SEVELAMER CARBONATE 800 MG: 800 TABLET, FILM COATED ORAL at 11:05

## 2023-05-29 RX ADMIN — HYDRALAZINE HYDROCHLORIDE 25 MG: 25 TABLET ORAL at 03:05

## 2023-05-29 RX ADMIN — CARVEDILOL 25 MG: 25 TABLET, FILM COATED ORAL at 07:05

## 2023-05-29 RX ADMIN — HEPARIN SODIUM 5000 UNITS: 5000 INJECTION INTRAVENOUS; SUBCUTANEOUS at 05:05

## 2023-05-29 RX ADMIN — SEVELAMER CARBONATE 800 MG: 800 TABLET, FILM COATED ORAL at 07:05

## 2023-05-29 RX ADMIN — LOSARTAN POTASSIUM 100 MG: 100 TABLET, FILM COATED ORAL at 08:05

## 2023-05-29 RX ADMIN — LACTULOSE 20 G: 20 SOLUTION ORAL at 02:05

## 2023-05-29 RX ADMIN — VANCOMYCIN HYDROCHLORIDE 1500 MG: 500 INJECTION, POWDER, LYOPHILIZED, FOR SOLUTION INTRAVENOUS at 05:05
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
## 2023-05-29 NOTE — ASSESSMENT & PLAN NOTE
1st and 2nd toe Left foot (acute) Osteomyelitis. Complicated by moderate vascular disease of Lower extremities.  CTA Bilateral lower extremities reveals moderate to severe calcified vascular disease affects the arteries of the bilateral lower extremities.    -medical management with Vancomycin and cefepime started on 5/25/23  -wound care consulted  -Wound CX pending  -Blood Cultures pending    5/29: may require biopsy and debridement by general surgery.  Per their last note, follow-up will be this week.      Antibiotics (From admission, onward)    Start     Stop Route Frequency Ordered    05/29/23 1800  vancomycin (VANCOCIN) 1,500 mg in dextrose 5 % (D5W) 250 mL IVPB         -- IV Once 05/29/23 1028    05/29/23 1130  mupirocin 2 % ointment         06/03 0859 Nasl 2 times daily 05/29/23 1028    05/26/23 2100  ceFEPIme (MAXIPIME) 1 g in dextrose 5 % in water (D5W) 5 % 50 mL IVPB (MB+)  (cefepime IVPB in adult patients)         -- IV Every 24 hours (non-standard times) 05/26/23 1623    05/26/23 1728  vancomycin - pharmacy to dose         -- IV pharmacy to manage frequency 05/26/23 1628

## 2023-05-29 NOTE — PROGRESS NOTES
Pharmacokinetic Assessment Follow Up: IV Vancomycin    Vancomycin serum concentration assessment(s):    The random level was drawn correctly and can be used to guide therapy at this time. The measurement is within the desired definitive target range of < 20.    Vancomycin Regimen Plan:    Vanc 1500 mg IV x 1 today with a random vanc level ordered for 6/2 at 0600    Drug levels (last 3 results):  Recent Labs   Lab Result Units 05/29/23  0508   Vancomycin, Random µg/mL 11.7       Pharmacy will continue to follow and monitor vancomycin.    Please contact pharmacy at extension [phone] for questions regarding this assessment.    Patient brief summary:  Lee Escobar is a 37 y.o. male initiated on antimicrobial therapy with IV Vancomycin for treatment of bone/joint infection    Drug Allergies:   Review of patient's allergies indicates:  No Known Allergies    Actual Body Weight:   76.4 kg    Renal Function:   Estimated Creatinine Clearance: 20.7 mL/min (A) (based on SCr of 4.51 mg/dL (H)).,     Dialysis Method (if applicable):  intermittent HD    CBC (last 72 hours):  No results for input(s): WHITE BLOOD CELL COUNT, HEMOGLOBIN, HEMATOCRIT, PLATELETS, GRAN%, LYMPH%, MONO%, EOSINOPHIL%, BASOPHIL%, DIFFERENTIAL METHOD in the last 72 hours.    Metabolic Panel (last 72 hours):  No results for input(s): SODIUM, POTASSIUM, CHLORIDE, CO2, GLUCOSE, BUN BLD, CREATININE, ALBUMIN, BILIRUBIN TOTAL, ALK PHOS, AST, ALT, MAGNESIUM, PHOSPHORUS in the last 72 hours.    Vancomycin Administrations:  vancomycin given in the last 96 hours                     vancomycin (VANCOCIN) 1,500 mg in dextrose 5 % (D5W) 250 mL IVPB (mg) 1,500 mg New Bag 05/25/23 5798                    Microbiologic Results:  Microbiology Results (last 7 days)       ** No results found for the last 168 hours. **

## 2023-05-29 NOTE — PROGRESS NOTES
Patient is seen on hemodialysis, they are tolerating this well, we will continue their treatment unchanged.     Physical exam general patient in no acute distress    Assessment/plan #1.  ESRD-continue HD support  2.  Osteomyelitis-continue present antibiotics and wound care  3.  Secondary hyperparathyroidism-continue Renvela  4.  Hypertension-continue present antihypertensives  5.  Diabetes mellitus-continue present hypoglycemic regimen, glucose was 220 today  6.  Anemia-this is mild the patient's hematocrit is 35% continue to monitor  7.  Peripheral vascular disease-patient had a CTA done this past Friday, it shows significant stenotic lesions with reconstitution below the lesions in both legs

## 2023-05-29 NOTE — PLAN OF CARE
[facility]  Initial Discharge Assessment       Primary Care Provider: Maria Elena Solorio II, MD    Admission Diagnosis: Wound infection [T14.8XXA, L08.9]    Admission Date: 5/26/2023  Expected Discharge Date:     Transition of Care Barriers: None    Payor: MEDICARE / Plan: MEDICARE PART A & B / Product Type: Government /     Extended Emergency Contact Information  Primary Emergency Contact: Michael Escobar  Mobile Phone: [phone]  Relation: Father  Preferred language: English   needed? No  Secondary Emergency Contact: Samantha Escobar  Mobile Phone: [phone]  Relation: Sister  Preferred language: English   needed? No    Discharge Plan A: Return to nursing home  Discharge Plan B: Return to Nursing Home    No Pharmacies Listed    Initial Assessment (most recent)       Adult Discharge Assessment - 05/29/23 1000          Discharge Assessment    Assessment Type Discharge Planning Assessment     Source of Information patient     Communicated ASHTYN with patient/caregiver Date not available/Unable to determine     People in Home facility resident     Facility Arrived From: [facility]     Do you expect to return to your current living situation? Yes     Do you have help at home or someone to help you manage your care at home? Yes     Who are your caregiver(s) and their phone number(s)? Michael Escobar (father) [phone]     Current cognitive status: Alert/Oriented     Home Accessibility wheelchair accessible     Home Layout Able to live on 1st floor     Equipment Currently Used at Home wheelchair     Readmission within 30 days? No     Patient currently being followed by outpatient case management? No     Do you currently have service(s) that help you manage your care at home? No     Do you take prescription medications? Yes     Do you have prescription coverage? Yes     Do you have any problems affording any of your prescribed medications? No     Is the patient  "taking medications as prescribed? yes     How do you get to doctors appointments? other (see comments)     Are you on dialysis? Yes     Dialysis Name and Scheduled days [facility] MWF     Do you take coumadin? No     Discharge Plan A Return to nursing home     Discharge Plan B Return to Nursing Home     DME Needed Upon Discharge  none     Discharge Plan discussed with: Patient     Transition of Care Barriers None                   Patient is a long term resident at [facility] Choice obtained to return to [facility] at discharge. Patient has a wheelchair for home use. He gets dialysis at the [facility] on MWF. SDOH is up to date. Patient notified of weekly IDT meetings on Thursday mornings at 8AM. Password set as "[id number]" by the patient. SS following for discharge needs as arise.              "

## 2023-05-29 NOTE — SUBJECTIVE & OBJECTIVE
Interval History:     Review of Systems   Constitutional:  Negative for chills and fever.   HENT:  Negative for congestion, hearing loss and trouble swallowing.    Eyes:  Negative for visual disturbance.   Respiratory:  Negative for cough and shortness of breath.    Cardiovascular:  Negative for chest pain, palpitations and leg swelling.   Gastrointestinal:  Negative for abdominal pain, blood in stool, diarrhea, nausea and vomiting.   Genitourinary:  Negative for difficulty urinating and hematuria.   Musculoskeletal:  Negative for back pain and myalgias.   Skin:  Positive for wound (left foot). Negative for rash.   Neurological:  Negative for dizziness, light-headedness and headaches.   Psychiatric/Behavioral:  Negative for sleep disturbance. The patient is not nervous/anxious.    Objective:     Vital Signs (Most Recent):  Temp: 97.8 °F (36.6 °C) (05/28/23 1600)  Pulse: 80 (05/28/23 1600)  Resp: 18 (05/28/23 1600)  BP: (!) 156/84 (05/28/23 1600)  SpO2: 98 % (05/28/23 1600) Vital Signs (24h Range):  Temp:  [97.8 °F (36.6 °C)-99.3 °F (37.4 °C)] 97.8 °F (36.6 °C)  Pulse:  [78-83] 80  Resp:  [16-18] 18  SpO2:  [95 %-98 %] 98 %  BP: (156-195)/() 156/84     Weight: 76.4 kg (168 lb 6.9 oz)  Body mass index is 29.84 kg/m².    Intake/Output Summary (Last 24 hours) at 5/28/2023 1924  Last data filed at 5/28/2023 1847  Gross per 24 hour   Intake 890 ml   Output --   Net 890 ml         Physical Exam  Constitutional:       General: He is not in acute distress.     Appearance: Normal appearance. He is normal weight. He is not toxic-appearing.   HENT:      Head: Normocephalic and atraumatic.      Right Ear: External ear normal.      Left Ear: External ear normal.      Nose: Nose normal.      Mouth/Throat:      Mouth: Mucous membranes are moist.      Pharynx: Oropharynx is clear.   Eyes:      Extraocular Movements: Extraocular movements intact.      Conjunctiva/sclera: Conjunctivae normal.   Cardiovascular:      Rate and  Rhythm: Normal rate and regular rhythm.      Pulses: Normal pulses.      Heart sounds: Normal heart sounds. No murmur heard.  Pulmonary:      Effort: Pulmonary effort is normal.      Breath sounds: Normal breath sounds.   Abdominal:      General: Bowel sounds are normal. There is no distension.      Palpations: Abdomen is soft.      Tenderness: There is no abdominal tenderness.   Musculoskeletal:         General: Signs of injury (1st and 2nd digit trauma with infection, odor, and displacement of the nail) present. No swelling or tenderness.      Cervical back: Normal range of motion and neck supple.      Right lower leg: No edema.      Left lower leg: No edema.      Comments: HD fistula left forearm   Neurological:      Mental Status: He is alert and oriented to person, place, and time. Mental status is at baseline.   Psychiatric:         Mood and Affect: Mood normal.         Behavior: Behavior normal.         Thought Content: Thought content normal.         Judgment: Judgment normal.           Significant Labs: All pertinent labs within the past 24 hours have been reviewed.    Significant Imaging: I have reviewed all pertinent imaging results/findings within the past 24 hours.
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
## 2023-05-30 LAB
GLUCOSE SERPL-MCNC: 186 MG/DL (ref 70–105)
GLUCOSE SERPL-MCNC: 352 MG/DL (ref 70–105)

## 2023-05-30 PROCEDURE — 63600175 PHARM REV CODE 636 W HCPCS: Performed by: NURSE PRACTITIONER

## 2023-05-30 PROCEDURE — 25000003 PHARM REV CODE 250: Performed by: FAMILY MEDICINE

## 2023-05-30 PROCEDURE — 99232 PR SUBSEQUENT HOSPITAL CARE,LEVL II: ICD-10-PCS | Mod: ,,, | Performed by: FAMILY MEDICINE

## 2023-05-30 PROCEDURE — 11000001 HC ACUTE MED/SURG PRIVATE ROOM

## 2023-05-30 PROCEDURE — 25000003 PHARM REV CODE 250: Performed by: HOSPITALIST

## 2023-05-30 PROCEDURE — 25000003 PHARM REV CODE 250: Performed by: NURSE PRACTITIONER

## 2023-05-30 PROCEDURE — 96372 THER/PROPH/DIAG INJ SC/IM: CPT

## 2023-05-30 PROCEDURE — 82962 GLUCOSE BLOOD TEST: CPT

## 2023-05-30 PROCEDURE — 63600175 PHARM REV CODE 636 W HCPCS: Performed by: FAMILY MEDICINE

## 2023-05-30 PROCEDURE — 99232 SBSQ HOSP IP/OBS MODERATE 35: CPT | Mod: ,,, | Performed by: FAMILY MEDICINE

## 2023-05-30 RX ORDER — ISOSORBIDE MONONITRATE 30 MG/1
30 TABLET, EXTENDED RELEASE ORAL DAILY
Status: DISCONTINUED | OUTPATIENT
Start: 2023-05-30 | End: 2023-06-01

## 2023-05-30 RX ADMIN — LACTULOSE 20 G: 20 SOLUTION ORAL at 09:05

## 2023-05-30 RX ADMIN — SEVELAMER CARBONATE 800 MG: 800 TABLET, FILM COATED ORAL at 12:05

## 2023-05-30 RX ADMIN — FERROUS SULFATE TAB 325 MG (65 MG ELEMENTAL FE) 1 EACH: 325 (65 FE) TAB at 08:05

## 2023-05-30 RX ADMIN — DOCUSATE SODIUM 100 MG: 100 CAPSULE, LIQUID FILLED ORAL at 09:05

## 2023-05-30 RX ADMIN — CILOSTAZOL 50 MG: 50 TABLET ORAL at 09:05

## 2023-05-30 RX ADMIN — CARVEDILOL 25 MG: 25 TABLET, FILM COATED ORAL at 08:05

## 2023-05-30 RX ADMIN — MUPIROCIN: 20 OINTMENT TOPICAL at 08:05

## 2023-05-30 RX ADMIN — AMLODIPINE BESYLATE 10 MG: 10 TABLET ORAL at 08:05

## 2023-05-30 RX ADMIN — AMPICILLIN SODIUM 2 G: 2 INJECTION, POWDER, FOR SOLUTION INTRAVENOUS at 05:05

## 2023-05-30 RX ADMIN — SEVELAMER CARBONATE 800 MG: 800 TABLET, FILM COATED ORAL at 06:05

## 2023-05-30 RX ADMIN — PANTOPRAZOLE SODIUM 40 MG: 40 TABLET, DELAYED RELEASE ORAL at 08:05

## 2023-05-30 RX ADMIN — INSULIN ASPART 5 UNITS: 100 INJECTION, SOLUTION INTRAVENOUS; SUBCUTANEOUS at 09:05

## 2023-05-30 RX ADMIN — HEPARIN SODIUM 5000 UNITS: 5000 INJECTION INTRAVENOUS; SUBCUTANEOUS at 02:05

## 2023-05-30 RX ADMIN — DOCUSATE SODIUM 100 MG: 100 CAPSULE, LIQUID FILLED ORAL at 08:05

## 2023-05-30 RX ADMIN — BUPROPION HYDROCHLORIDE 150 MG: 150 TABLET, FILM COATED, EXTENDED RELEASE ORAL at 09:05

## 2023-05-30 RX ADMIN — CARVEDILOL 25 MG: 25 TABLET, FILM COATED ORAL at 05:05

## 2023-05-30 RX ADMIN — CILOSTAZOL 50 MG: 50 TABLET ORAL at 08:05

## 2023-05-30 RX ADMIN — LACTULOSE 20 G: 20 SOLUTION ORAL at 02:05

## 2023-05-30 RX ADMIN — MUPIROCIN: 20 OINTMENT TOPICAL at 09:05

## 2023-05-30 RX ADMIN — LACTULOSE 20 G: 20 SOLUTION ORAL at 08:05

## 2023-05-30 RX ADMIN — HEPARIN SODIUM 5000 UNITS: 5000 INJECTION INTRAVENOUS; SUBCUTANEOUS at 05:05

## 2023-05-30 RX ADMIN — ISOSORBIDE MONONITRATE 30 MG: 30 TABLET, EXTENDED RELEASE ORAL at 05:05

## 2023-05-30 RX ADMIN — HEPARIN SODIUM 5000 UNITS: 5000 INJECTION INTRAVENOUS; SUBCUTANEOUS at 09:05

## 2023-05-30 RX ADMIN — LOSARTAN POTASSIUM 100 MG: 100 TABLET, FILM COATED ORAL at 09:05

## 2023-05-30 RX ADMIN — BUPROPION HYDROCHLORIDE 150 MG: 150 TABLET, FILM COATED, EXTENDED RELEASE ORAL at 08:05

## 2023-05-30 RX ADMIN — SEVELAMER CARBONATE 800 MG: 800 TABLET, FILM COATED ORAL at 05:05

## 2023-05-30 RX ADMIN — INSULIN DETEMIR 10 UNITS: 100 INJECTION, SOLUTION SUBCUTANEOUS at 09:05

## 2023-05-30 RX ADMIN — INSULIN ASPART 2 UNITS: 100 INJECTION, SOLUTION INTRAVENOUS; SUBCUTANEOUS at 06:05
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
## 2023-05-30 NOTE — PROGRESS NOTES
Patient denies shortness of breath.  Review of systems GI denies nausea or vomiting    Physical exam general patient in no acute distress    Assessment/plan #1.  ESRD-continue HD support  2.  Osteomyelitis-continue present antibiotics and wound care  3.  Secondary hyperparathyroidism-continue Renvela  4.  Hypertension-continue present antihypertensives  5.  Diabetes mellitus-continue present hypoglycemic regimen, glucose was 186 today  6.  Anemia  7.  Peripheral vascular disease-patient had a CTA done this past Friday, it shows significant stenotic lesions with reconstitution below the lesions in both legs

## 2023-05-30 NOTE — PLAN OF CARE
Problem: Adult Inpatient Plan of Care  Goal: Plan of Care Review  Outcome: Ongoing, Progressing  Goal: Patient-Specific Goal (Individualized)  Outcome: Ongoing, Progressing  Goal: Absence of Hospital-Acquired Illness or Injury  Outcome: Ongoing, Progressing  Goal: Optimal Comfort and Wellbeing  Outcome: Ongoing, Progressing  Goal: Readiness for Transition of Care  Outcome: Ongoing, Progressing     Problem: Impaired Wound Healing  Goal: Optimal Wound Healing  Outcome: Ongoing, Progressing     Problem: Infection  Goal: Absence of Infection Signs and Symptoms  Outcome: Ongoing, Progressing     Problem: Device-Related Complication Risk (Hemodialysis)  Goal: Safe, Effective Therapy Delivery  Outcome: Ongoing, Progressing     Problem: Infection (Hemodialysis)  Goal: Absence of Infection Signs and Symptoms  Outcome: Ongoing, Progressing

## 2023-05-30 NOTE — ASSESSMENT & PLAN NOTE
Patient's FSGs are uncontrolled due to hyperglycemia on current medication regimen.  Last A1c reviewed-   Lab Results   Component Value Date    HGBA1C 6.4 02/06/2021     Most recent fingerstick glucose reviewed- No results for input(s): POCTGLUCOSE in the last 24 hours.  Current correctional scale  Medium  Increase anti-hyperglycemic dose as follows-   Antihyperglycemics (From admission, onward)    Start     Stop Route Frequency Ordered    05/31/23 0900  insulin detemir U-100 injection 3 Units         -- SubQ Daily 05/30/23 1448    05/26/23 2100  insulin detemir U-100 injection 10 Units         -- SubQ Nightly 05/26/23 1623    05/26/23 1623  insulin aspart U-100 injection 1-10 Units         -- SubQ Before meals & nightly PRN 05/26/23 1623        Hold Oral hypoglycemics while patient is in the hospital.

## 2023-05-30 NOTE — PROGRESS NOTES
Wound care note:  Patient skin was evaluated today  Patient in bed, Alert.   He is a paraplegic ,has upper body strength   Left great toe and 2nd toe with dry black crusty build up noted. Discolored skin bed. Open to air.  Sacral and Left ischial with old healed wounds, skin is pale pink discolored scar tissue   Right ischial has old scar tissue with area of pink partial thick skin loss.   Applied Aquacel Foam border to areas of buttock and ischial  Left heel wound with dark black semi soft tissue noted.   Applied Aquacel Foam border  States he performs In and out cath at nursing home  Skin integrity compromised due to limited mobility , paraplegic, DM, Incontinence of fecal and urine etc.   VINOD Gorman to assess Left heel in am 5/30/23.   Cont turn protocol  Waffle boots/pillows to offload heels   On Size wise redistribution mattress for comfort and support  Wound care team to follow

## 2023-05-30 NOTE — PROGRESS NOTES
Wound care note;  VINOD Gorman FNP assessed Left heel today  Will start santly ointment in am .  Applied vashe with drawtex to area today.

## 2023-05-30 NOTE — ASSESSMENT & PLAN NOTE
1st and 2nd toe Left foot (acute) Osteomyelitis. Complicated by moderate vascular disease of Lower extremities.  CTA Bilateral lower extremities reveals moderate to severe calcified vascular disease affects the arteries of the bilateral lower extremities.    -medical management with Vancomycin and cefepime started on 5/25/23  -wound care consulted    5/29: may require biopsy and debridement by general surgery.  Per their last note, follow-up will be this week.      5/30: Wound culture with Proteus and Enterococcus both sensitive to ampicillin, with adjust therapy    Antibiotics (From admission, onward)    Start     Stop Route Frequency Ordered    05/29/23 1130  mupirocin 2 % ointment         06/03 0859 Nasl 2 times daily 05/29/23 1028    05/26/23 2100  ceFEPIme (MAXIPIME) 1 g in dextrose 5 % in water (D5W) 5 % 50 mL IVPB (MB+)  (cefepime IVPB in adult patients)         -- IV Every 24 hours (non-standard times) 05/26/23 1623    05/26/23 1728  vancomycin - pharmacy to dose         -- IV pharmacy to manage frequency 05/26/23 1628

## 2023-05-30 NOTE — SUBJECTIVE & OBJECTIVE
Interval History:     No acute events overnight, no complaints this morning. Final wound culture with Proteus and Enterococcus, with adjust antibiotics accordingly with pharmacy assistance for renal/HD dosing.     Review of Systems   Constitutional:  Negative for chills and fever.   HENT:  Negative for congestion, hearing loss and trouble swallowing.    Eyes:  Negative for visual disturbance.   Respiratory:  Negative for cough and shortness of breath.    Cardiovascular:  Negative for chest pain, palpitations and leg swelling.   Gastrointestinal:  Negative for abdominal pain, blood in stool, diarrhea, nausea and vomiting.   Genitourinary:  Negative for difficulty urinating and hematuria.   Musculoskeletal:  Negative for back pain and myalgias.   Skin:  Positive for wound (left foot). Negative for rash.   Neurological:  Negative for dizziness, light-headedness and headaches.   Psychiatric/Behavioral:  Negative for sleep disturbance. The patient is not nervous/anxious.    Objective:     Vital Signs (Most Recent):  Temp: 98.1 °F (36.7 °C) (05/30/23 1203)  Pulse: 79 (05/30/23 0816)  Resp: 20 (05/30/23 0816)  BP: (!) 161/87 (05/30/23 1203)  SpO2: 100 % (05/30/23 0816) Vital Signs (24h Range):  Temp:  [98.1 °F (36.7 °C)-98.6 °F (37 °C)] 98.1 °F (36.7 °C)  Pulse:  [78-89] 79  Resp:  [17-20] 20  SpO2:  [94 %-100 %] 100 %  BP: (161-210)/(67-93) 161/87     Weight: 75.1 kg (165 lb 9.1 oz)  Body mass index is 29.33 kg/m².  No intake or output data in the 24 hours ending 05/30/23 1558        Physical Exam  Constitutional:       General: He is not in acute distress.     Appearance: Normal appearance. He is normal weight. He is not toxic-appearing.   HENT:      Head: Normocephalic and atraumatic.      Right Ear: External ear normal.      Left Ear: External ear normal.      Nose: Nose normal.      Mouth/Throat:      Mouth: Mucous membranes are moist.      Pharynx: Oropharynx is clear.   Eyes:      Extraocular Movements: Extraocular  movements intact.      Conjunctiva/sclera: Conjunctivae normal.   Cardiovascular:      Rate and Rhythm: Normal rate and regular rhythm.      Pulses: Normal pulses.      Heart sounds: Normal heart sounds. No murmur heard.  Pulmonary:      Effort: Pulmonary effort is normal.      Breath sounds: Normal breath sounds.   Abdominal:      General: Bowel sounds are normal. There is no distension.      Palpations: Abdomen is soft.      Tenderness: There is no abdominal tenderness.   Musculoskeletal:         General: Signs of injury (1st and 2nd digit trauma with infection, odor, and displacement of the nail) present. No swelling or tenderness.      Cervical back: Normal range of motion and neck supple.      Right lower leg: No edema.      Left lower leg: No edema.      Comments: HD fistula left forearm   Neurological:      Mental Status: He is alert and oriented to person, place, and time. Mental status is at baseline.   Psychiatric:         Mood and Affect: Mood normal.         Behavior: Behavior normal.         Thought Content: Thought content normal.         Judgment: Judgment normal.           Significant Labs: All pertinent labs within the past 24 hours have been reviewed.    Significant Imaging: I have reviewed all pertinent imaging results/findings within the past 24 hours.

## 2023-05-31 PROBLEM — L89.312 PRESSURE INJURY OF RIGHT BUTTOCK, STAGE 2: Status: ACTIVE | Noted: 2023-05-31

## 2023-05-31 PROBLEM — L97.526 DIABETIC ULCER OF TOE OF LEFT FOOT ASSOCIATED WITH TYPE 2 DIABETES MELLITUS, WITH BONE INVOLVEMENT WITHOUT EVIDENCE OF NECROSIS: Status: ACTIVE | Noted: 2023-05-31

## 2023-05-31 PROBLEM — L89.620 PRESSURE INJURY OF LEFT HEEL, UNSTAGEABLE: Status: ACTIVE | Noted: 2023-05-31

## 2023-05-31 PROBLEM — E11.621 DIABETIC ULCER OF TOE OF LEFT FOOT ASSOCIATED WITH TYPE 2 DIABETES MELLITUS, WITH BONE INVOLVEMENT WITHOUT EVIDENCE OF NECROSIS: Status: ACTIVE | Noted: 2023-05-31

## 2023-05-31 LAB
BACTERIA BLD CULT: NORMAL
BACTERIA BLD CULT: NORMAL
GLUCOSE SERPL-MCNC: 112 MG/DL (ref 70–105)
GLUCOSE SERPL-MCNC: 154 MG/DL (ref 70–105)
GLUCOSE SERPL-MCNC: 179 MG/DL (ref 70–105)
GLUCOSE SERPL-MCNC: 240 MG/DL (ref 70–105)
GLUCOSE SERPL-MCNC: 246 MG/DL (ref 70–105)

## 2023-05-31 PROCEDURE — 63600175 PHARM REV CODE 636 W HCPCS: Performed by: FAMILY MEDICINE

## 2023-05-31 PROCEDURE — 90935 HEMODIALYSIS ONE EVALUATION: CPT

## 2023-05-31 PROCEDURE — 25000003 PHARM REV CODE 250: Performed by: HOSPITALIST

## 2023-05-31 PROCEDURE — 25000003 PHARM REV CODE 250: Performed by: FAMILY MEDICINE

## 2023-05-31 PROCEDURE — 11000001 HC ACUTE MED/SURG PRIVATE ROOM

## 2023-05-31 PROCEDURE — 25000003 PHARM REV CODE 250: Performed by: INTERNAL MEDICINE

## 2023-05-31 PROCEDURE — 99232 PR SUBSEQUENT HOSPITAL CARE,LEVL II: ICD-10-PCS | Mod: ,,, | Performed by: NURSE PRACTITIONER

## 2023-05-31 PROCEDURE — 63600175 PHARM REV CODE 636 W HCPCS: Performed by: NURSE PRACTITIONER

## 2023-05-31 PROCEDURE — 99232 SBSQ HOSP IP/OBS MODERATE 35: CPT | Mod: ,,, | Performed by: FAMILY MEDICINE

## 2023-05-31 PROCEDURE — 25000003 PHARM REV CODE 250: Performed by: NURSE PRACTITIONER

## 2023-05-31 PROCEDURE — 99232 SBSQ HOSP IP/OBS MODERATE 35: CPT | Mod: ,,, | Performed by: NURSE PRACTITIONER

## 2023-05-31 PROCEDURE — 82962 GLUCOSE BLOOD TEST: CPT

## 2023-05-31 PROCEDURE — 99232 PR SUBSEQUENT HOSPITAL CARE,LEVL II: ICD-10-PCS | Mod: ,,, | Performed by: FAMILY MEDICINE

## 2023-05-31 RX ORDER — SODIUM CHLORIDE 9 MG/ML
INJECTION, SOLUTION INTRAVENOUS
Status: DISPENSED | OUTPATIENT
Start: 2023-05-31 | End: 2023-06-30

## 2023-05-31 RX ADMIN — AMLODIPINE BESYLATE 10 MG: 10 TABLET ORAL at 08:05

## 2023-05-31 RX ADMIN — CARVEDILOL 25 MG: 25 TABLET, FILM COATED ORAL at 08:05

## 2023-05-31 RX ADMIN — INSULIN ASPART 2 UNITS: 100 INJECTION, SOLUTION INTRAVENOUS; SUBCUTANEOUS at 09:05

## 2023-05-31 RX ADMIN — MUPIROCIN: 20 OINTMENT TOPICAL at 09:05

## 2023-05-31 RX ADMIN — CILOSTAZOL 50 MG: 50 TABLET ORAL at 09:05

## 2023-05-31 RX ADMIN — INSULIN DETEMIR 10 UNITS: 100 INJECTION, SOLUTION SUBCUTANEOUS at 09:05

## 2023-05-31 RX ADMIN — BUPROPION HYDROCHLORIDE 150 MG: 150 TABLET, FILM COATED, EXTENDED RELEASE ORAL at 09:05

## 2023-05-31 RX ADMIN — ISOSORBIDE MONONITRATE 30 MG: 30 TABLET, EXTENDED RELEASE ORAL at 08:05

## 2023-05-31 RX ADMIN — LOSARTAN POTASSIUM 100 MG: 100 TABLET, FILM COATED ORAL at 09:05

## 2023-05-31 RX ADMIN — LACTULOSE 20 G: 20 SOLUTION ORAL at 08:05

## 2023-05-31 RX ADMIN — HEPARIN SODIUM 5000 UNITS: 5000 INJECTION INTRAVENOUS; SUBCUTANEOUS at 06:05

## 2023-05-31 RX ADMIN — BUPROPION HYDROCHLORIDE 150 MG: 150 TABLET, FILM COATED, EXTENDED RELEASE ORAL at 08:05

## 2023-05-31 RX ADMIN — CILOSTAZOL 50 MG: 50 TABLET ORAL at 08:05

## 2023-05-31 RX ADMIN — SEVELAMER CARBONATE 800 MG: 800 TABLET, FILM COATED ORAL at 04:05

## 2023-05-31 RX ADMIN — FERROUS SULFATE TAB 325 MG (65 MG ELEMENTAL FE) 1 EACH: 325 (65 FE) TAB at 08:05

## 2023-05-31 RX ADMIN — AMPICILLIN SODIUM 2 G: 2 INJECTION, POWDER, FOR SOLUTION INTRAVENOUS at 06:05

## 2023-05-31 RX ADMIN — SODIUM CHLORIDE: 9 INJECTION, SOLUTION INTRAVENOUS at 09:05

## 2023-05-31 RX ADMIN — PANTOPRAZOLE SODIUM 40 MG: 40 TABLET, DELAYED RELEASE ORAL at 08:05

## 2023-05-31 RX ADMIN — SEVELAMER CARBONATE 800 MG: 800 TABLET, FILM COATED ORAL at 12:05

## 2023-05-31 RX ADMIN — COLLAGENASE SANTYL: 250 OINTMENT TOPICAL at 02:05

## 2023-05-31 RX ADMIN — INSULIN DETEMIR 7 UNITS: 100 INJECTION, SOLUTION SUBCUTANEOUS at 08:05

## 2023-05-31 RX ADMIN — INSULIN ASPART 4 UNITS: 100 INJECTION, SOLUTION INTRAVENOUS; SUBCUTANEOUS at 06:05

## 2023-05-31 RX ADMIN — MUPIROCIN: 20 OINTMENT TOPICAL at 08:05

## 2023-05-31 RX ADMIN — HEPARIN SODIUM 5000 UNITS: 5000 INJECTION INTRAVENOUS; SUBCUTANEOUS at 09:05

## 2023-05-31 RX ADMIN — CARVEDILOL 25 MG: 25 TABLET, FILM COATED ORAL at 05:05

## 2023-05-31 RX ADMIN — AMPICILLIN SODIUM 2 G: 2 INJECTION, POWDER, FOR SOLUTION INTRAVENOUS at 05:05

## 2023-05-31 RX ADMIN — LACTULOSE 20 G: 20 SOLUTION ORAL at 02:05

## 2023-05-31 RX ADMIN — HEPARIN SODIUM 5000 UNITS: 5000 INJECTION INTRAVENOUS; SUBCUTANEOUS at 02:05

## 2023-05-31 RX ADMIN — DOCUSATE SODIUM 100 MG: 100 CAPSULE, LIQUID FILLED ORAL at 08:05

## 2023-05-31 RX ADMIN — SEVELAMER CARBONATE 800 MG: 800 TABLET, FILM COATED ORAL at 08:05
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
## 2023-05-31 NOTE — ASSESSMENT & PLAN NOTE
Clean wound vashe  Spray skin barrier around wound  Apply santyl whitney thick to wound bed, cover with vashe moistened 4x4  Cover and secure with 4x4s, hallie, and paper tape  Change daily  Turn every two hours  Low air loss mattress  Keep pressure off wound  TAP system-utilize wedges for repositioning

## 2023-05-31 NOTE — SUBJECTIVE & OBJECTIVE
Interval History:     No acute events overnight, no complaints this morning. Blood pressure improved somewhat, continue current management and monitor. Adjusting insulin for hyperglycemia. Continue antibiotics and wound care.    Review of Systems   Constitutional:  Negative for chills and fever.   HENT:  Negative for congestion, hearing loss and trouble swallowing.    Eyes:  Negative for visual disturbance.   Respiratory:  Negative for cough and shortness of breath.    Cardiovascular:  Negative for chest pain, palpitations and leg swelling.   Gastrointestinal:  Negative for abdominal pain, blood in stool, diarrhea, nausea and vomiting.   Genitourinary:  Negative for difficulty urinating and hematuria.   Musculoskeletal:  Negative for back pain and myalgias.   Skin:  Positive for wound (left foot). Negative for rash.   Neurological:  Negative for dizziness, light-headedness and headaches.   Psychiatric/Behavioral:  Negative for sleep disturbance. The patient is not nervous/anxious.    Objective:     Vital Signs (Most Recent):  Temp: 98.2 °F (36.8 °C) (05/31/23 0400)  Pulse: 77 (05/31/23 0400)  Resp: 20 (05/31/23 0400)  BP: (!) 162/83 (05/31/23 0400)  SpO2: 95 % (05/31/23 0400) Vital Signs (24h Range):  Temp:  [98 °F (36.7 °C)-98.4 °F (36.9 °C)] 98.2 °F (36.8 °C)  Pulse:  [77-82] 77  Resp:  [20] 20  SpO2:  [94 %-100 %] 95 %  BP: (148-173)/(77-93) 162/83     Weight: 76.9 kg (169 lb 8.5 oz)  Body mass index is 30.03 kg/m².  No intake or output data in the 24 hours ending 05/31/23 0806        Physical Exam  Constitutional:       General: He is not in acute distress.     Appearance: Normal appearance. He is normal weight. He is not toxic-appearing.   HENT:      Head: Normocephalic and atraumatic.      Right Ear: External ear normal.      Left Ear: External ear normal.      Nose: Nose normal.      Mouth/Throat:      Mouth: Mucous membranes are moist.      Pharynx: Oropharynx is clear.   Eyes:      Extraocular Movements:  Extraocular movements intact.      Conjunctiva/sclera: Conjunctivae normal.   Cardiovascular:      Rate and Rhythm: Normal rate and regular rhythm.      Pulses: Normal pulses.      Heart sounds: Normal heart sounds. No murmur heard.  Pulmonary:      Effort: Pulmonary effort is normal.      Breath sounds: Normal breath sounds.   Abdominal:      General: Bowel sounds are normal. There is no distension.      Palpations: Abdomen is soft.      Tenderness: There is no abdominal tenderness.   Musculoskeletal:         General: Signs of injury (1st and 2nd digit trauma with infection, odor, and displacement of the nail) present. No swelling or tenderness.      Cervical back: Normal range of motion and neck supple.      Right lower leg: No edema.      Left lower leg: No edema.      Comments: HD fistula left forearm   Neurological:      Mental Status: He is alert and oriented to person, place, and time. Mental status is at baseline.   Psychiatric:         Mood and Affect: Mood normal.         Behavior: Behavior normal.         Thought Content: Thought content normal.         Judgment: Judgment normal.           Significant Labs: All pertinent labs within the past 24 hours have been reviewed.    Significant Imaging: I have reviewed all pertinent imaging results/findings within the past 24 hours.
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
## 2023-05-31 NOTE — ASSESSMENT & PLAN NOTE
1st and 2nd toe Left foot (acute) Osteomyelitis. Complicated by moderate vascular disease of Lower extremities.  CTA Bilateral lower extremities reveals moderate to severe calcified vascular disease affects the arteries of the bilateral lower extremities.    -medical management with Vancomycin and cefepime started on 5/25/23  -wound care consulted    5/29: may require biopsy and debridement by general surgery.  Per their last note, follow-up will be this week.      5/30: Wound culture with Proteus and Enterococcus both sensitive to ampicillin, with adjust therapy    Antibiotics (From admission, onward)    Start     Stop Route Frequency Ordered    05/30/23 1800  ampicillin (OMNIPEN) 2 g in sodium chloride 0.9 % 100 mL IVPB (MB+)         -- IV Every 12 hours (non-standard times) 05/30/23 1537    05/29/23 1130  mupirocin 2 % ointment         06/03 0859 Nasl 2 times daily 05/29/23 1028

## 2023-05-31 NOTE — ASSESSMENT & PLAN NOTE
Clean with vashe or  baby shampoo and water  Apply aquacel ag border foam to wound.  Change every M, W,F and PRN

## 2023-05-31 NOTE — PLAN OF CARE
Problem: Adult Inpatient Plan of Care  Goal: Plan of Care Review  Outcome: Ongoing, Progressing  Goal: Patient-Specific Goal (Individualized)  Outcome: Ongoing, Progressing  Goal: Absence of Hospital-Acquired Illness or Injury  Outcome: Ongoing, Progressing  Goal: Optimal Comfort and Wellbeing  Outcome: Ongoing, Progressing  Goal: Readiness for Transition of Care  Outcome: Ongoing, Progressing     Problem: Diabetes Comorbidity  Goal: Blood Glucose Level Within Targeted Range  Outcome: Ongoing, Progressing     Problem: Impaired Wound Healing  Goal: Optimal Wound Healing  Outcome: Ongoing, Progressing     Problem: Fall Injury Risk  Goal: Absence of Fall and Fall-Related Injury  Outcome: Ongoing, Progressing     Problem: Infection  Goal: Absence of Infection Signs and Symptoms  Outcome: Ongoing, Progressing     Problem: Skin Injury Risk Increased  Goal: Skin Health and Integrity  Outcome: Ongoing, Progressing     Problem: Device-Related Complication Risk (Hemodialysis)  Goal: Safe, Effective Therapy Delivery  Outcome: Ongoing, Progressing

## 2023-05-31 NOTE — NURSING
Patient tx complete today without any complications. Pulled net of 2500ml during tx. Left lower arm graft site worked good. Bruit and thrill present.
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
## 2023-05-31 NOTE — ASSESSMENT & PLAN NOTE
Clean wound with vashe  Apply silvadene  Cover and secure with 4x4s, hallie, and paper tape  Change daily  Turn every two hours  Low air loss mattress  Keep pressure off wound  TAP system
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
## 2023-05-31 NOTE — HOSPITAL COURSE
5/31/2023 - Wound care consulted to evaluate and follow wounds . POC established today. Barriers to wound healing identified and preventive measures in place  6/7/2023 - Purulent drainage expressed from left great toe and toe nail removed. Patient is scheduled for angiogram tomorrow. Silvadene to wounds  6/13/2023 - Continue to have purulent drainage, removed eschar. Continue current plan of care.   6/20/2023 - Wounds to right buttock continues to improve. Left heel with eschar and slough, bedside debridement today. D/c silvadene. Aquacel ag to buttock. Vashe and drawtex to heel.   6/27/2023 - Wounds continue to show improvement. Continue current plan of care.   7/5/2023 - Left heel is healing well. Buttock and sacral continues to have maceration, d/c optifoam border and use aquacel foam border or abd pad.

## 2023-05-31 NOTE — ASSESSMENT & PLAN NOTE
Patient's FSGs are uncontrolled due to hyperglycemia on current medication regimen.  Last A1c reviewed-   Lab Results   Component Value Date    HGBA1C 6.4 02/06/2021     Most recent fingerstick glucose reviewed- No results for input(s): POCTGLUCOSE in the last 24 hours.  Current correctional scale  Medium  Increase anti-hyperglycemic dose as follows-   Antihyperglycemics (From admission, onward)    Start     Stop Route Frequency Ordered    05/31/23 0900  insulin detemir U-100 injection 7 Units         -- SubQ Daily 05/31/23 0806    05/26/23 2100  insulin detemir U-100 injection 10 Units         -- SubQ Nightly 05/26/23 1623    05/26/23 1623  insulin aspart U-100 injection 1-10 Units         -- SubQ Before meals & nightly PRN 05/26/23 1623        Hold Oral hypoglycemics while patient is in the hospital.

## 2023-05-31 NOTE — PROGRESS NOTES
Patient denies shortness of breath.  Review of systems GI denies nausea or vomiting    Physical exam general patient in no acute distress    Assessment/plan #1.  ESRD-continue HD support  2.  Osteomyelitis-continue present antibiotics and wound care  3.  Secondary hyperparathyroidism-continue Renvela  4.  Hypertension-continue present antihypertensives  5.  Diabetes mellitus-continue present hypoglycemic regimen, glucose was 112 today  6.  Anemia  7.  Peripheral vascular disease

## 2023-06-01 LAB
EST. AVERAGE GLUCOSE BLD GHB EST-MCNC: 107 MG/DL
GLUCOSE SERPL-MCNC: 132 MG/DL (ref 70–105)
GLUCOSE SERPL-MCNC: 229 MG/DL (ref 70–105)
GLUCOSE SERPL-MCNC: 85 MG/DL (ref 70–105)
HBA1C MFR BLD HPLC: 5.8 % (ref 4.5–6.6)

## 2023-06-01 PROCEDURE — 63600175 PHARM REV CODE 636 W HCPCS: Performed by: NURSE PRACTITIONER

## 2023-06-01 PROCEDURE — 99232 SBSQ HOSP IP/OBS MODERATE 35: CPT | Mod: ,,, | Performed by: FAMILY MEDICINE

## 2023-06-01 PROCEDURE — 11000001 HC ACUTE MED/SURG PRIVATE ROOM

## 2023-06-01 PROCEDURE — 82962 GLUCOSE BLOOD TEST: CPT

## 2023-06-01 PROCEDURE — 99232 PR SUBSEQUENT HOSPITAL CARE,LEVL II: ICD-10-PCS | Mod: ,,, | Performed by: FAMILY MEDICINE

## 2023-06-01 PROCEDURE — 63600175 PHARM REV CODE 636 W HCPCS: Performed by: FAMILY MEDICINE

## 2023-06-01 PROCEDURE — 25000003 PHARM REV CODE 250: Performed by: NURSE PRACTITIONER

## 2023-06-01 PROCEDURE — 25000003 PHARM REV CODE 250: Performed by: FAMILY MEDICINE

## 2023-06-01 PROCEDURE — 83036 HEMOGLOBIN GLYCOSYLATED A1C: CPT | Performed by: FAMILY MEDICINE

## 2023-06-01 RX ORDER — INSULIN ASPART 100 [IU]/ML
0-5 INJECTION, SOLUTION INTRAVENOUS; SUBCUTANEOUS
Status: DISCONTINUED | OUTPATIENT
Start: 2023-06-01 | End: 2023-07-07 | Stop reason: HOSPADM

## 2023-06-01 RX ORDER — DEXTROSE 40 %
30 GEL (GRAM) ORAL
Status: DISCONTINUED | OUTPATIENT
Start: 2023-06-01 | End: 2023-06-01

## 2023-06-01 RX ORDER — DEXTROSE 40 %
15 GEL (GRAM) ORAL
Status: DISCONTINUED | OUTPATIENT
Start: 2023-06-01 | End: 2023-06-01

## 2023-06-01 RX ORDER — GLUCAGON 1 MG
1 KIT INJECTION
Status: DISCONTINUED | OUTPATIENT
Start: 2023-06-01 | End: 2023-07-07 | Stop reason: HOSPADM

## 2023-06-01 RX ORDER — ISOSORBIDE MONONITRATE 60 MG/1
60 TABLET, EXTENDED RELEASE ORAL DAILY
Status: DISCONTINUED | OUTPATIENT
Start: 2023-06-01 | End: 2023-06-13

## 2023-06-01 RX ADMIN — CARVEDILOL 25 MG: 25 TABLET, FILM COATED ORAL at 05:06

## 2023-06-01 RX ADMIN — INSULIN DETEMIR 10 UNITS: 100 INJECTION, SOLUTION SUBCUTANEOUS at 09:06

## 2023-06-01 RX ADMIN — HEPARIN SODIUM 5000 UNITS: 5000 INJECTION INTRAVENOUS; SUBCUTANEOUS at 10:06

## 2023-06-01 RX ADMIN — LOSARTAN POTASSIUM 100 MG: 100 TABLET, FILM COATED ORAL at 09:06

## 2023-06-01 RX ADMIN — HEPARIN SODIUM 5000 UNITS: 5000 INJECTION INTRAVENOUS; SUBCUTANEOUS at 02:06

## 2023-06-01 RX ADMIN — SEVELAMER CARBONATE 800 MG: 800 TABLET, FILM COATED ORAL at 12:06

## 2023-06-01 RX ADMIN — AMPICILLIN SODIUM 2 G: 2 INJECTION, POWDER, FOR SOLUTION INTRAVENOUS at 06:06

## 2023-06-01 RX ADMIN — CARVEDILOL 25 MG: 25 TABLET, FILM COATED ORAL at 07:06

## 2023-06-01 RX ADMIN — MUPIROCIN: 20 OINTMENT TOPICAL at 09:06

## 2023-06-01 RX ADMIN — PANTOPRAZOLE SODIUM 40 MG: 40 TABLET, DELAYED RELEASE ORAL at 09:06

## 2023-06-01 RX ADMIN — HYDRALAZINE HYDROCHLORIDE 25 MG: 25 TABLET ORAL at 05:06

## 2023-06-01 RX ADMIN — INSULIN ASPART 4 UNITS: 100 INJECTION, SOLUTION INTRAVENOUS; SUBCUTANEOUS at 06:06

## 2023-06-01 RX ADMIN — BUPROPION HYDROCHLORIDE 150 MG: 150 TABLET, FILM COATED, EXTENDED RELEASE ORAL at 09:06

## 2023-06-01 RX ADMIN — CILOSTAZOL 50 MG: 50 TABLET ORAL at 09:06

## 2023-06-01 RX ADMIN — AMLODIPINE BESYLATE 10 MG: 10 TABLET ORAL at 09:06

## 2023-06-01 RX ADMIN — ISOSORBIDE MONONITRATE 60 MG: 60 TABLET, EXTENDED RELEASE ORAL at 09:06

## 2023-06-01 RX ADMIN — SEVELAMER CARBONATE 800 MG: 800 TABLET, FILM COATED ORAL at 07:06

## 2023-06-01 RX ADMIN — FERROUS SULFATE TAB 325 MG (65 MG ELEMENTAL FE) 1 EACH: 325 (65 FE) TAB at 09:06

## 2023-06-01 RX ADMIN — HEPARIN SODIUM 5000 UNITS: 5000 INJECTION INTRAVENOUS; SUBCUTANEOUS at 05:06

## 2023-06-01 RX ADMIN — SEVELAMER CARBONATE 800 MG: 800 TABLET, FILM COATED ORAL at 05:06

## 2023-06-01 NOTE — PLAN OF CARE
Problem: Fall Injury Risk  Goal: Absence of Fall and Fall-Related Injury  Outcome: Ongoing, Progressing     Problem: Fall Injury Risk  Goal: Absence of Fall and Fall-Related Injury  Outcome: Ongoing, Progressing     Problem: Skin Injury Risk Increased  Goal: Skin Health and Integrity  Outcome: Ongoing, Progressing     Problem: Skin Injury Risk Increased  Goal: Skin Health and Integrity  Outcome: Ongoing, Progressing

## 2023-06-01 NOTE — PLAN OF CARE
Problem: Adult Inpatient Plan of Care  Goal: Plan of Care Review  Outcome: Ongoing, Progressing  Flowsheets (Taken 6/1/2023 1133)  Plan of Care Reviewed With: patient  Goal: Patient-Specific Goal (Individualized)  Outcome: Ongoing, Progressing  Goal: Absence of Hospital-Acquired Illness or Injury  Outcome: Ongoing, Progressing  Goal: Optimal Comfort and Wellbeing  Outcome: Ongoing, Progressing  Goal: Readiness for Transition of Care  Outcome: Ongoing, Progressing     Problem: Fall Injury Risk  Goal: Absence of Fall and Fall-Related Injury  Outcome: Ongoing, Progressing     Problem: Skin Injury Risk Increased  Goal: Skin Health and Integrity  Outcome: Ongoing, Progressing     Problem: Device-Related Complication Risk (Hemodialysis)  Goal: Safe, Effective Therapy Delivery  Outcome: Ongoing, Progressing

## 2023-06-01 NOTE — ASSESSMENT & PLAN NOTE
Patient's FSGs are uncontrolled due to hyperglycemia on current medication regimen.  Last A1c reviewed-   Lab Results   Component Value Date    HGBA1C 6.4 02/06/2021     Most recent fingerstick glucose reviewed- No results for input(s): POCTGLUCOSE in the last 24 hours.  Current correctional scale  Low  Increase anti-hyperglycemic dose as follows-   Antihyperglycemics (From admission, onward)    Start     Stop Route Frequency Ordered    06/01/23 0900  insulin detemir U-100 injection 10 Units         -- SubQ Daily 06/01/23 0740    06/01/23 0842  insulin aspart U-100 injection 0-5 Units         -- SubQ Before meals & nightly PRN 06/01/23 0743    05/26/23 2100  insulin detemir U-100 injection 10 Units         -- SubQ Nightly 05/26/23 1623        Hold Oral hypoglycemics while patient is in the hospital.
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
## 2023-06-01 NOTE — SUBJECTIVE & OBJECTIVE
Interval History:     No acute events overnight, no complaints this morning.Continued hypertension and hyperglycemia, will adjust therapy and monitor. Continue antibiotics and wound care.    Review of Systems   Constitutional:  Negative for chills and fever.   HENT:  Negative for congestion, hearing loss and trouble swallowing.    Eyes:  Negative for visual disturbance.   Respiratory:  Negative for cough and shortness of breath.    Cardiovascular:  Negative for chest pain, palpitations and leg swelling.   Gastrointestinal:  Negative for abdominal pain, blood in stool, diarrhea, nausea and vomiting.   Genitourinary:  Negative for difficulty urinating and hematuria.   Musculoskeletal:  Negative for back pain and myalgias.   Skin:  Positive for wound (left foot). Negative for rash.   Neurological:  Negative for dizziness, light-headedness and headaches.   Psychiatric/Behavioral:  Negative for sleep disturbance. The patient is not nervous/anxious.    Objective:     Vital Signs (Most Recent):  Temp: 98.2 °F (36.8 °C) (06/01/23 0400)  Pulse: 79 (06/01/23 0624)  Resp: 18 (06/01/23 0400)  BP: (!) 171/86 (06/01/23 0624)  SpO2: 98 % (06/01/23 0400) Vital Signs (24h Range):  Temp:  [98 °F (36.7 °C)-99.2 °F (37.3 °C)] 98.2 °F (36.8 °C)  Pulse:  [77-88] 79  Resp:  [18] 18  SpO2:  [95 %-98 %] 98 %  BP: (141-194)/(68-95) 171/86     Weight: 76.9 kg (169 lb 8.5 oz)  Body mass index is 30.03 kg/m².    Intake/Output Summary (Last 24 hours) at 6/1/2023 0788  Last data filed at 6/1/2023 0706  Gross per 24 hour   Intake 400 ml   Output 3001 ml   Net -2601 ml           Physical Exam  Constitutional:       General: He is not in acute distress.     Appearance: Normal appearance. He is normal weight. He is not toxic-appearing.   HENT:      Head: Normocephalic and atraumatic.      Right Ear: External ear normal.      Left Ear: External ear normal.      Nose: Nose normal.      Mouth/Throat:      Mouth: Mucous membranes are moist.      Pharynx:  Oropharynx is clear.   Eyes:      Extraocular Movements: Extraocular movements intact.      Conjunctiva/sclera: Conjunctivae normal.   Cardiovascular:      Rate and Rhythm: Normal rate and regular rhythm.      Pulses: Normal pulses.      Heart sounds: Normal heart sounds. No murmur heard.  Pulmonary:      Effort: Pulmonary effort is normal.      Breath sounds: Normal breath sounds.   Abdominal:      General: Bowel sounds are normal. There is no distension.      Palpations: Abdomen is soft.      Tenderness: There is no abdominal tenderness.   Musculoskeletal:         General: Signs of injury (1st and 2nd digit trauma with infection, odor, and displacement of the nail) present. No swelling or tenderness.      Cervical back: Normal range of motion and neck supple.      Right lower leg: No edema.      Left lower leg: No edema.      Comments: HD fistula left forearm   Neurological:      Mental Status: He is alert and oriented to person, place, and time. Mental status is at baseline.   Psychiatric:         Mood and Affect: Mood normal.         Behavior: Behavior normal.         Thought Content: Thought content normal.         Judgment: Judgment normal.           Significant Labs: All pertinent labs within the past 24 hours have been reviewed.    Significant Imaging: I have reviewed all pertinent imaging results/findings within the past 24 hours.

## 2023-06-02 LAB
GLUCOSE SERPL-MCNC: 108 MG/DL (ref 70–105)
GLUCOSE SERPL-MCNC: 120 MG/DL (ref 70–105)
GLUCOSE SERPL-MCNC: 141 MG/DL (ref 70–105)
GLUCOSE SERPL-MCNC: 76 MG/DL (ref 70–105)

## 2023-06-02 PROCEDURE — 63600175 PHARM REV CODE 636 W HCPCS: Performed by: NURSE PRACTITIONER

## 2023-06-02 PROCEDURE — 99232 SBSQ HOSP IP/OBS MODERATE 35: CPT | Mod: ,,, | Performed by: FAMILY MEDICINE

## 2023-06-02 PROCEDURE — 25000003 PHARM REV CODE 250: Performed by: FAMILY MEDICINE

## 2023-06-02 PROCEDURE — 25000003 PHARM REV CODE 250: Performed by: INTERNAL MEDICINE

## 2023-06-02 PROCEDURE — 63600175 PHARM REV CODE 636 W HCPCS: Performed by: FAMILY MEDICINE

## 2023-06-02 PROCEDURE — 25000003 PHARM REV CODE 250: Performed by: NURSE PRACTITIONER

## 2023-06-02 PROCEDURE — 82962 GLUCOSE BLOOD TEST: CPT

## 2023-06-02 PROCEDURE — 99232 PR SUBSEQUENT HOSPITAL CARE,LEVL II: ICD-10-PCS | Mod: ,,, | Performed by: FAMILY MEDICINE

## 2023-06-02 PROCEDURE — 90935 HEMODIALYSIS ONE EVALUATION: CPT

## 2023-06-02 PROCEDURE — 11000001 HC ACUTE MED/SURG PRIVATE ROOM

## 2023-06-02 RX ADMIN — SEVELAMER CARBONATE 800 MG: 800 TABLET, FILM COATED ORAL at 11:06

## 2023-06-02 RX ADMIN — MUPIROCIN: 20 OINTMENT TOPICAL at 09:06

## 2023-06-02 RX ADMIN — CARVEDILOL 25 MG: 25 TABLET, FILM COATED ORAL at 07:06

## 2023-06-02 RX ADMIN — CARVEDILOL 25 MG: 25 TABLET, FILM COATED ORAL at 06:06

## 2023-06-02 RX ADMIN — HEPARIN SODIUM 5000 UNITS: 5000 INJECTION INTRAVENOUS; SUBCUTANEOUS at 02:06

## 2023-06-02 RX ADMIN — INSULIN DETEMIR 10 UNITS: 100 INJECTION, SOLUTION SUBCUTANEOUS at 09:06

## 2023-06-02 RX ADMIN — ISOSORBIDE MONONITRATE 60 MG: 60 TABLET, EXTENDED RELEASE ORAL at 09:06

## 2023-06-02 RX ADMIN — AMLODIPINE BESYLATE 10 MG: 10 TABLET ORAL at 09:06

## 2023-06-02 RX ADMIN — SEVELAMER CARBONATE 800 MG: 800 TABLET, FILM COATED ORAL at 04:06

## 2023-06-02 RX ADMIN — BUPROPION HYDROCHLORIDE 150 MG: 150 TABLET, FILM COATED, EXTENDED RELEASE ORAL at 09:06

## 2023-06-02 RX ADMIN — LOSARTAN POTASSIUM 100 MG: 100 TABLET, FILM COATED ORAL at 08:06

## 2023-06-02 RX ADMIN — AMPICILLIN SODIUM 2 G: 2 INJECTION, POWDER, FOR SOLUTION INTRAVENOUS at 05:06

## 2023-06-02 RX ADMIN — LACTULOSE 20 G: 20 SOLUTION ORAL at 02:06

## 2023-06-02 RX ADMIN — SODIUM CHLORIDE 2000 ML: 9 INJECTION, SOLUTION INTRAVENOUS at 03:06

## 2023-06-02 RX ADMIN — CILOSTAZOL 50 MG: 50 TABLET ORAL at 08:06

## 2023-06-02 RX ADMIN — SEVELAMER CARBONATE 800 MG: 800 TABLET, FILM COATED ORAL at 06:06

## 2023-06-02 RX ADMIN — CILOSTAZOL 50 MG: 50 TABLET ORAL at 09:06

## 2023-06-02 RX ADMIN — DOCUSATE SODIUM 100 MG: 100 CAPSULE, LIQUID FILLED ORAL at 09:06

## 2023-06-02 RX ADMIN — HEPARIN SODIUM 5000 UNITS: 5000 INJECTION INTRAVENOUS; SUBCUTANEOUS at 09:06

## 2023-06-02 RX ADMIN — PANTOPRAZOLE SODIUM 40 MG: 40 TABLET, DELAYED RELEASE ORAL at 09:06

## 2023-06-02 RX ADMIN — HEPARIN SODIUM 5000 UNITS: 5000 INJECTION INTRAVENOUS; SUBCUTANEOUS at 05:06

## 2023-06-02 RX ADMIN — FERROUS SULFATE TAB 325 MG (65 MG ELEMENTAL FE) 1 EACH: 325 (65 FE) TAB at 09:06

## 2023-06-02 RX ADMIN — LACTULOSE 20 G: 20 SOLUTION ORAL at 09:06

## 2023-06-02 RX ADMIN — AMPICILLIN SODIUM 2 G: 2 INJECTION, POWDER, FOR SOLUTION INTRAVENOUS at 06:06

## 2023-06-02 RX ADMIN — BUPROPION HYDROCHLORIDE 150 MG: 150 TABLET, FILM COATED, EXTENDED RELEASE ORAL at 08:06

## 2023-06-02 NOTE — ASSESSMENT & PLAN NOTE
Patient's FSGs are controlled on current medication regimen.  Last A1c reviewed-   Lab Results   Component Value Date    HGBA1C 5.8 06/01/2023     Most recent fingerstick glucose reviewed- No results for input(s): POCTGLUCOSE in the last 24 hours.  Current correctional scale  Low  Maintain anti-hyperglycemic dose as follows-   Antihyperglycemics (From admission, onward)    Start     Stop Route Frequency Ordered    06/01/23 0900  insulin detemir U-100 injection 10 Units         -- SubQ Daily 06/01/23 0740    06/01/23 0842  insulin aspart U-100 injection 0-5 Units         -- SubQ Before meals & nightly PRN 06/01/23 0743    05/26/23 2100  insulin detemir U-100 injection 10 Units         -- SubQ Nightly 05/26/23 1623        Hold Oral hypoglycemics while patient is in the hospital.
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
## 2023-06-02 NOTE — SUBJECTIVE & OBJECTIVE
Interval History:     No acute events overnight, no complaints this morning.Blood glucose improved, still some hypertension but will continue current meds for now and reassess following HD. Continue antibiotics and wound care.    Review of Systems   Constitutional:  Negative for chills and fever.   HENT:  Negative for congestion, hearing loss and trouble swallowing.    Eyes:  Negative for visual disturbance.   Respiratory:  Negative for cough and shortness of breath.    Cardiovascular:  Negative for chest pain, palpitations and leg swelling.   Gastrointestinal:  Negative for abdominal pain, blood in stool, diarrhea, nausea and vomiting.   Genitourinary:  Negative for difficulty urinating and hematuria.   Musculoskeletal:  Negative for back pain and myalgias.   Skin:  Positive for wound (left foot). Negative for rash.   Neurological:  Negative for dizziness, light-headedness and headaches.   Psychiatric/Behavioral:  Negative for sleep disturbance. The patient is not nervous/anxious.    Objective:     Vital Signs (Most Recent):  Temp: 98 °F (36.7 °C) (06/02/23 0753)  Pulse: 81 (06/02/23 0753)  Resp: 18 (06/02/23 0753)  BP: (!) 177/89 (06/02/23 0753)  SpO2: 95 % (06/02/23 0753) Vital Signs (24h Range):  Temp:  [97.6 °F (36.4 °C)-98.4 °F (36.9 °C)] 98 °F (36.7 °C)  Pulse:  [73-81] 81  Resp:  [18] 18  SpO2:  [95 %-98 %] 95 %  BP: (128-177)/(70-89) 177/89     Weight: 74.4 kg (164 lb 0.4 oz)  Body mass index is 29.06 kg/m².    Intake/Output Summary (Last 24 hours) at 6/2/2023 0856  Last data filed at 6/2/2023 0628  Gross per 24 hour   Intake 200 ml   Output --   Net 200 ml           Physical Exam  Constitutional:       General: He is not in acute distress.     Appearance: Normal appearance. He is normal weight. He is not toxic-appearing.   HENT:      Head: Normocephalic and atraumatic.      Right Ear: External ear normal.      Left Ear: External ear normal.      Nose: Nose normal.      Mouth/Throat:      Mouth: Mucous  membranes are moist.      Pharynx: Oropharynx is clear.   Eyes:      Extraocular Movements: Extraocular movements intact.      Conjunctiva/sclera: Conjunctivae normal.   Cardiovascular:      Rate and Rhythm: Normal rate and regular rhythm.      Pulses: Normal pulses.      Heart sounds: Normal heart sounds. No murmur heard.  Pulmonary:      Effort: Pulmonary effort is normal.      Breath sounds: Normal breath sounds.   Abdominal:      General: Bowel sounds are normal. There is no distension.      Palpations: Abdomen is soft.      Tenderness: There is no abdominal tenderness.   Musculoskeletal:         General: Signs of injury (1st and 2nd digit trauma with infection, odor, and displacement of the nail) present. No swelling or tenderness.      Cervical back: Normal range of motion and neck supple.      Right lower leg: No edema.      Left lower leg: No edema.      Comments: HD fistula left forearm   Neurological:      Mental Status: He is alert and oriented to person, place, and time. Mental status is at baseline.   Psychiatric:         Mood and Affect: Mood normal.         Behavior: Behavior normal.         Thought Content: Thought content normal.         Judgment: Judgment normal.           Significant Labs: All pertinent labs within the past 24 hours have been reviewed.    Significant Imaging: I have reviewed all pertinent imaging results/findings within the past 24 hours.

## 2023-06-02 NOTE — ASSESSMENT & PLAN NOTE
Currently at baseline. Did state that he has recently started a more intensive therapy program at his facility and would like to continue inpatient if possible. Will discuss with PT.

## 2023-06-02 NOTE — PROGRESS NOTES
[facility]  Nephrology  Progress Note    Patient Name: Lee Escobar  MRN: [id number]  Admission Date: 5/26/2023  Hospital Length of Stay: 6 days  Attending Provider: Samantha Garcia DO   Primary Care Physician: Maria Elena Solorio II, MD  Principal Problem:Osteomyelitis of left foot    Consults  Subjective:     Interval History: The patient has no complaints today    Review of patient's allergies indicates:  No Known Allergies  Current Facility-Administered Medications   Medication Frequency    0.9%  NaCl infusion PRN    acetaminophen tablet 650 mg Q4H PRN    amLODIPine tablet 10 mg Daily    ampicillin (OMNIPEN) 2 g in sodium chloride 0.9 % 100 mL IVPB (MB+) Q12H    buPROPion TBSR 12 hr tablet 150 mg BID    carvediloL tablet 25 mg BID WM    cilostazoL tablet 50 mg BID    collagenase ointment Daily PRN    dextrose 50% injection 12.5 g PRN    dextrose 50% injection 25 g PRN    docusate sodium capsule 100 mg BID    ferrous sulfate tablet 1 each Daily    glucagon (human recombinant) injection 1 mg PRN    glucagon (human recombinant) injection 1 mg PRN    glucose chewable tablet 16 g PRN    glucose chewable tablet 24 g PRN    heparin (porcine) injection 5,000 Units Q8H    hydrALAZINE tablet 25 mg Q8H PRN    HYDROcodone-acetaminophen 5-325 mg per tablet 1 tablet Q6H PRN    insulin aspart U-100 injection 0-5 Units QID (AC + HS) PRN    insulin detemir U-100 injection 10 Units QHS    insulin detemir U-100 injection 10 Units Daily    isosorbide mononitrate 24 hr tablet 60 mg Daily    lactulose 20 gram/30 mL solution Soln 20 g TID    linaCLOtide capsule 145 mcg Daily PRN    losartan tablet 100 mg QHS    melatonin tablet 6 mg Nightly PRN    mupirocin 2 % ointment BID    naloxone 0.4 mg/mL injection 0.02 mg PRN    ondansetron injection 4 mg Q8H PRN    pantoprazole EC tablet 40 mg Daily    sevelamer carbonate tablet 800 mg TIDWM    sodium chloride 0.9% flush 10 mL Q12H PRN       Objective:     Vital  Signs (Most Recent):  Temp: 97.6 °F (36.4 °C) (06/01/23 1600)  Pulse: 73 (06/01/23 1600)  Resp: 18 (06/01/23 1600)  BP: 128/70 (06/01/23 1600)  SpO2: 97 % (06/01/23 1600) Vital Signs (24h Range):  Temp:  [97.6 °F (36.4 °C)-99.1 °F (37.3 °C)] 97.6 °F (36.4 °C)  Pulse:  [73-81] 73  Resp:  [18-20] 18  SpO2:  [96 %-98 %] 97 %  BP: (128-194)/(68-95) 128/70     Weight: 76.9 kg (169 lb 8.5 oz) (05/31/23 0500)  Body mass index is 30.03 kg/m².  Body surface area is 1.85 meters squared.    I/O last 3 completed shifts:  In: 400 [IV Piggyback:400]  Out: 3001 [Other:3000; Stool:1]    Physical Exam  Lungs-clear  Cor-no rub  Abd-soft    Significant Labs:sureCBC:   Recent Labs   Lab 05/26/23  0526   WBC 6.76   RBC 4.02*   HGB 10.8*   HCT 34.9*      MCV 86.8   MCH 26.9*   MCHC 30.9*     CMP:   Recent Labs   Lab 05/26/23  0526   *   CALCIUM 9.1      K 4.5   CO2 24   *   BUN 52*   CREATININE 4.51*     All labs within the past 24 hours have been reviewed.    Significant Imaging:      Assessment/Plan:     Active Diagnoses:    Diagnosis Date Noted POA    PRINCIPAL PROBLEM:  Osteomyelitis of left foot [M86.9] 05/25/2023 Yes    Diabetic ulcer of toe of left foot associated with type 2 diabetes mellitus, with bone involvement without evidence of necrosis [E11.621, L97.526] 05/31/2023 Yes    Pressure injury of left heel, unstageable [L89.620] 05/31/2023 Yes    Pressure injury of right buttock, stage 2 [L89.312] 05/31/2023 Yes    HTN (hypertension) [I10] 05/25/2023 Yes     Chronic    Type 2 diabetes mellitus with kidney complication, with long-term current use of insulin [E11.29, Z79.4] 05/25/2023 Not Applicable     Chronic    Constipation [K59.00] 05/25/2023 Yes     Chronic    ESRD (end stage renal disease) [N18.6] 05/25/2023 Yes     Chronic    Wound infection [T14.8XXA, L08.9] 05/25/2023 Yes    Major depression [F32.9] 05/25/2023 Yes      Problems Resolved During this Admission:       Plan:  Hemodialysis  tomorrow  Thank you for your consult. I will follow-up with patient. Please contact us if you have any additional questions.    Danny Peter MD  Nephrology  [facility]

## 2023-06-03 LAB
GLUCOSE SERPL-MCNC: 107 MG/DL (ref 70–105)
GLUCOSE SERPL-MCNC: 149 MG/DL (ref 70–105)
GLUCOSE SERPL-MCNC: 176 MG/DL (ref 70–105)
GLUCOSE SERPL-MCNC: 203 MG/DL (ref 70–105)
GLUCOSE SERPL-MCNC: 259 MG/DL (ref 70–105)
GLUCOSE SERPL-MCNC: 53 MG/DL (ref 70–105)

## 2023-06-03 PROCEDURE — 99233 PR SUBSEQUENT HOSPITAL CARE,LEVL III: ICD-10-PCS | Mod: ,,, | Performed by: HOSPITALIST

## 2023-06-03 PROCEDURE — 25000003 PHARM REV CODE 250: Performed by: FAMILY MEDICINE

## 2023-06-03 PROCEDURE — 11000001 HC ACUTE MED/SURG PRIVATE ROOM

## 2023-06-03 PROCEDURE — 25000003 PHARM REV CODE 250: Performed by: INTERNAL MEDICINE

## 2023-06-03 PROCEDURE — 63600175 PHARM REV CODE 636 W HCPCS: Performed by: NURSE PRACTITIONER

## 2023-06-03 PROCEDURE — 82962 GLUCOSE BLOOD TEST: CPT

## 2023-06-03 PROCEDURE — 99233 SBSQ HOSP IP/OBS HIGH 50: CPT | Mod: ,,, | Performed by: HOSPITALIST

## 2023-06-03 PROCEDURE — 25000003 PHARM REV CODE 250: Performed by: NURSE PRACTITIONER

## 2023-06-03 PROCEDURE — 63600175 PHARM REV CODE 636 W HCPCS: Performed by: FAMILY MEDICINE

## 2023-06-03 RX ORDER — ALPRAZOLAM 0.25 MG/1
0.5 TABLET ORAL 3 TIMES DAILY PRN
Status: DISCONTINUED | OUTPATIENT
Start: 2023-06-03 | End: 2023-07-07 | Stop reason: HOSPADM

## 2023-06-03 RX ADMIN — SEVELAMER CARBONATE 800 MG: 800 TABLET, FILM COATED ORAL at 06:06

## 2023-06-03 RX ADMIN — CILOSTAZOL 50 MG: 50 TABLET ORAL at 08:06

## 2023-06-03 RX ADMIN — CARVEDILOL 25 MG: 25 TABLET, FILM COATED ORAL at 05:06

## 2023-06-03 RX ADMIN — LACTULOSE 20 G: 20 SOLUTION ORAL at 03:06

## 2023-06-03 RX ADMIN — BUPROPION HYDROCHLORIDE 150 MG: 150 TABLET, FILM COATED, EXTENDED RELEASE ORAL at 08:06

## 2023-06-03 RX ADMIN — LOSARTAN POTASSIUM 100 MG: 100 TABLET, FILM COATED ORAL at 08:06

## 2023-06-03 RX ADMIN — HEPARIN SODIUM 5000 UNITS: 5000 INJECTION INTRAVENOUS; SUBCUTANEOUS at 09:06

## 2023-06-03 RX ADMIN — LACTULOSE 20 G: 20 SOLUTION ORAL at 08:06

## 2023-06-03 RX ADMIN — SEVELAMER CARBONATE 800 MG: 800 TABLET, FILM COATED ORAL at 05:06

## 2023-06-03 RX ADMIN — DOCUSATE SODIUM 100 MG: 100 CAPSULE, LIQUID FILLED ORAL at 08:06

## 2023-06-03 RX ADMIN — ALPRAZOLAM 0.5 MG: 0.25 TABLET ORAL at 10:06

## 2023-06-03 RX ADMIN — SEVELAMER CARBONATE 800 MG: 800 TABLET, FILM COATED ORAL at 11:06

## 2023-06-03 RX ADMIN — INSULIN ASPART 2 UNITS: 100 INJECTION, SOLUTION INTRAVENOUS; SUBCUTANEOUS at 06:06

## 2023-06-03 RX ADMIN — HEPARIN SODIUM 5000 UNITS: 5000 INJECTION INTRAVENOUS; SUBCUTANEOUS at 02:06

## 2023-06-03 RX ADMIN — AMPICILLIN SODIUM 2 G: 2 INJECTION, POWDER, FOR SOLUTION INTRAVENOUS at 06:06

## 2023-06-03 RX ADMIN — AMPICILLIN SODIUM 2 G: 2 INJECTION, POWDER, FOR SOLUTION INTRAVENOUS at 05:06

## 2023-06-03 RX ADMIN — AMLODIPINE BESYLATE 10 MG: 10 TABLET ORAL at 08:06

## 2023-06-03 RX ADMIN — ISOSORBIDE MONONITRATE 60 MG: 60 TABLET, EXTENDED RELEASE ORAL at 08:06

## 2023-06-03 RX ADMIN — DEXTROSE MONOHYDRATE 12.5 G: 25 INJECTION, SOLUTION INTRAVENOUS at 01:06

## 2023-06-03 RX ADMIN — CARVEDILOL 25 MG: 25 TABLET, FILM COATED ORAL at 08:06

## 2023-06-03 RX ADMIN — FERROUS SULFATE TAB 325 MG (65 MG ELEMENTAL FE) 1 EACH: 325 (65 FE) TAB at 08:06

## 2023-06-03 RX ADMIN — PANTOPRAZOLE SODIUM 40 MG: 40 TABLET, DELAYED RELEASE ORAL at 08:06

## 2023-06-03 RX ADMIN — HEPARIN SODIUM 5000 UNITS: 5000 INJECTION INTRAVENOUS; SUBCUTANEOUS at 05:06

## 2023-06-03 RX ADMIN — INSULIN DETEMIR 10 UNITS: 100 INJECTION, SOLUTION SUBCUTANEOUS at 08:06

## 2023-06-03 NOTE — NURSING
"Awake and alert in be called on call light with c/o "shakeiness". Accucheck 53. Gave 1/2 amp D 50 IVP/Will continue to monitor.  "

## 2023-06-03 NOTE — SUBJECTIVE & OBJECTIVE
Interval History:     Review of Systems   Constitutional:  Negative for appetite change, fatigue and fever.   HENT:  Negative for congestion, hearing loss and trouble swallowing.    Respiratory:  Negative for chest tightness, shortness of breath and wheezing.    Cardiovascular:  Negative for chest pain and palpitations.   Gastrointestinal:  Positive for constipation. Negative for abdominal pain and nausea.   Genitourinary:  Positive for difficulty urinating. Negative for dysuria.   Musculoskeletal:  Positive for gait problem. Negative for back pain and neck stiffness.   Skin:  Positive for wound. Negative for pallor and rash.   Neurological:  Positive for weakness. Negative for dizziness, speech difficulty and headaches.   Psychiatric/Behavioral:  Negative for confusion and suicidal ideas.    Objective:     Vital Signs (Most Recent):  Temp: 97.6 °F (36.4 °C) (06/03/23 1254)  Pulse: 80 (06/03/23 1254)  Resp: 18 (06/03/23 1254)  BP: (!) 158/82 (06/03/23 1254)  SpO2: 100 % (06/03/23 1254) Vital Signs (24h Range):  Temp:  [97.4 °F (36.3 °C)-98 °F (36.7 °C)] 97.6 °F (36.4 °C)  Pulse:  [78-83] 80  Resp:  [16-18] 18  SpO2:  [96 %-100 %] 100 %  BP: (143-173)/(79-92) 158/82     Weight: 74.4 kg (164 lb 0.4 oz)  Body mass index is 29.06 kg/m².    Intake/Output Summary (Last 24 hours) at 6/3/2023 1558  Last data filed at 6/3/2023 0637  Gross per 24 hour   Intake 200 ml   Output --   Net 200 ml         Physical Exam  Vitals reviewed.   Constitutional:       General: He is awake. He is not in acute distress.     Appearance: He is well-developed. He is not toxic-appearing.   HENT:      Head: Normocephalic.      Nose: Nose normal.      Mouth/Throat:      Pharynx: Oropharynx is clear.   Eyes:      Extraocular Movements: Extraocular movements intact.      Pupils: Pupils are equal, round, and reactive to light.   Neck:      Thyroid: No thyroid mass.      Vascular: No carotid bruit.   Cardiovascular:      Rate and Rhythm: Normal rate  and regular rhythm.      Pulses: Normal pulses.      Heart sounds: Normal heart sounds. No murmur heard.  Pulmonary:      Effort: Pulmonary effort is normal.      Breath sounds: Normal breath sounds and air entry. No wheezing.   Abdominal:      General: Bowel sounds are normal. There is no distension.      Palpations: Abdomen is soft.      Tenderness: There is no abdominal tenderness.   Musculoskeletal:         General: Signs of injury present. Normal range of motion.      Cervical back: Neck supple. No rigidity.      Comments: Wounds to 1st and 2nd digit left foot that are currently dressed   Skin:     General: Skin is warm.      Coloration: Skin is not jaundiced.      Findings: No lesion.   Neurological:      Mental Status: He is alert and oriented to person, place, and time.      Cranial Nerves: No cranial nerve deficit.      Motor: Weakness present.      Gait: Gait abnormal.      Comments:  1 over 5  weakness to bilateral lower extremities   Psychiatric:         Attention and Perception: Attention normal.         Mood and Affect: Mood normal.         Behavior: Behavior normal. Behavior is cooperative.         Thought Content: Thought content normal.         Cognition and Memory: Cognition normal.           Significant Labs: All pertinent labs within the past 24 hours have been reviewed.  BMP: No results for input(s): GLU, NA, K, CL, CO2, BUN, CREATININE, CALCIUM, MG in the last 48 hours.  CBC: No results for input(s): WBC, HGB, HCT, PLT in the last 48 hours.  CMP: No results for input(s): NA, K, CL, CO2, GLU, BUN, CREATININE, CALCIUM, PROT, ALBUMIN, BILITOT, ALKPHOS, AST, ALT, ANIONGAP, EGFRNONAA in the last 48 hours.    Invalid input(s): ESTGFAFRICA    Significant Imaging: I have reviewed all pertinent imaging results/findings within the past 24 hours.    Microbiology Results (last 7 days)       ** No results found for the last 168 hours. **          Intake/Output - Last 3 Shifts         06/01 0700  06/02 0659  06/02 0700  06/03 0659 06/03 0700  06/04 0659    P.O.  0     Other       IV Piggyback 600 200     Total Intake(mL/kg) 600 (8.1) 200 (2.7)     Other       Stool       Total Output       Net +600 +200            Urine Occurrence 1 x      Stool Occurrence 1 x

## 2023-06-03 NOTE — PROGRESS NOTES
Patient denies shortness of breath.  Review of systems GI denies nausea or vomiting    Physical exam general patient in no acute distress    Assessment/plan #1.  ESRD-continue HD support  2.  Osteomyelitis-continue present antibiotics and wound care  3.  Secondary hyperparathyroidism-continue Renvela  4.  Hypertension-continue present antihypertensives  5.  Diabetes mellitus-continue present hypoglycemic regimen, glucose was 107 today  6.  Anemia  7.  Peripheral vascular disease

## 2023-06-03 NOTE — ASSESSMENT & PLAN NOTE
Patient's FSGs are controlled on current medication regimen.  Last A1c reviewed-   Lab Results   Component Value Date    HGBA1C 5.8 06/01/2023     Most recent fingerstick glucose reviewed- No results for input(s): POCTGLUCOSE in the last 24 hours.  Current correctional scale  Low  Maintain anti-hyperglycemic dose as follows-   Antihyperglycemics (From admission, onward)    Start     Stop Route Frequency Ordered    06/01/23 0900  insulin detemir U-100 injection 10 Units         -- SubQ Daily 06/01/23 0740    06/01/23 0842  insulin aspart U-100 injection 0-5 Units         -- SubQ Before meals & nightly PRN 06/01/23 0743    05/26/23 2100  insulin detemir U-100 injection 10 Units         -- SubQ Nightly 05/26/23 1623        Hold Oral hypoglycemics while patient is in the hospital.    06/04 [facility] nursing home which is checking blood sugar once a day and taking insulin just at night.  Discussed regiment where he would take insulin with each meal and long-acting at night and he was okay with this plan up.

## 2023-06-04 LAB
GLUCOSE SERPL-MCNC: 214 MG/DL (ref 70–105)
GLUCOSE SERPL-MCNC: 221 MG/DL (ref 70–105)
GLUCOSE SERPL-MCNC: 46 MG/DL (ref 70–105)
GLUCOSE SERPL-MCNC: 74 MG/DL (ref 70–105)
GLUCOSE SERPL-MCNC: 97 MG/DL (ref 70–105)

## 2023-06-04 PROCEDURE — 82962 GLUCOSE BLOOD TEST: CPT

## 2023-06-04 PROCEDURE — 99232 PR SUBSEQUENT HOSPITAL CARE,LEVL II: ICD-10-PCS | Mod: ,,, | Performed by: HOSPITALIST

## 2023-06-04 PROCEDURE — 63600175 PHARM REV CODE 636 W HCPCS: Performed by: NURSE PRACTITIONER

## 2023-06-04 PROCEDURE — 63600175 PHARM REV CODE 636 W HCPCS: Performed by: FAMILY MEDICINE

## 2023-06-04 PROCEDURE — 25000003 PHARM REV CODE 250: Performed by: NURSE PRACTITIONER

## 2023-06-04 PROCEDURE — 99232 SBSQ HOSP IP/OBS MODERATE 35: CPT | Mod: ,,, | Performed by: HOSPITALIST

## 2023-06-04 PROCEDURE — 11000001 HC ACUTE MED/SURG PRIVATE ROOM

## 2023-06-04 PROCEDURE — 25000003 PHARM REV CODE 250: Performed by: FAMILY MEDICINE

## 2023-06-04 PROCEDURE — 63600175 PHARM REV CODE 636 W HCPCS: Performed by: HOSPITALIST

## 2023-06-04 RX ORDER — INSULIN ASPART 100 [IU]/ML
4 INJECTION, SOLUTION INTRAVENOUS; SUBCUTANEOUS
Status: DISCONTINUED | OUTPATIENT
Start: 2023-06-04 | End: 2023-06-28

## 2023-06-04 RX ADMIN — INSULIN ASPART 4 UNITS: 100 INJECTION, SOLUTION INTRAVENOUS; SUBCUTANEOUS at 03:06

## 2023-06-04 RX ADMIN — LACTULOSE 20 G: 20 SOLUTION ORAL at 04:06

## 2023-06-04 RX ADMIN — LACTULOSE 20 G: 20 SOLUTION ORAL at 09:06

## 2023-06-04 RX ADMIN — INSULIN ASPART 4 UNITS: 100 INJECTION, SOLUTION INTRAVENOUS; SUBCUTANEOUS at 11:06

## 2023-06-04 RX ADMIN — INSULIN DETEMIR 10 UNITS: 100 INJECTION, SOLUTION SUBCUTANEOUS at 09:06

## 2023-06-04 RX ADMIN — LACTULOSE 20 G: 20 SOLUTION ORAL at 08:06

## 2023-06-04 RX ADMIN — SEVELAMER CARBONATE 800 MG: 800 TABLET, FILM COATED ORAL at 11:06

## 2023-06-04 RX ADMIN — DOCUSATE SODIUM 100 MG: 100 CAPSULE, LIQUID FILLED ORAL at 08:06

## 2023-06-04 RX ADMIN — FERROUS SULFATE TAB 325 MG (65 MG ELEMENTAL FE) 1 EACH: 325 (65 FE) TAB at 08:06

## 2023-06-04 RX ADMIN — AMPICILLIN SODIUM 2 G: 2 INJECTION, POWDER, FOR SOLUTION INTRAVENOUS at 05:06

## 2023-06-04 RX ADMIN — AMLODIPINE BESYLATE 10 MG: 10 TABLET ORAL at 08:06

## 2023-06-04 RX ADMIN — INSULIN ASPART 2 UNITS: 100 INJECTION, SOLUTION INTRAVENOUS; SUBCUTANEOUS at 03:06

## 2023-06-04 RX ADMIN — DOCUSATE SODIUM 100 MG: 100 CAPSULE, LIQUID FILLED ORAL at 09:06

## 2023-06-04 RX ADMIN — ISOSORBIDE MONONITRATE 60 MG: 60 TABLET, EXTENDED RELEASE ORAL at 08:06

## 2023-06-04 RX ADMIN — HEPARIN SODIUM 5000 UNITS: 5000 INJECTION INTRAVENOUS; SUBCUTANEOUS at 09:06

## 2023-06-04 RX ADMIN — BUPROPION HYDROCHLORIDE 150 MG: 150 TABLET, FILM COATED, EXTENDED RELEASE ORAL at 08:06

## 2023-06-04 RX ADMIN — HEPARIN SODIUM 5000 UNITS: 5000 INJECTION INTRAVENOUS; SUBCUTANEOUS at 05:06

## 2023-06-04 RX ADMIN — SEVELAMER CARBONATE 800 MG: 800 TABLET, FILM COATED ORAL at 06:06

## 2023-06-04 RX ADMIN — CILOSTAZOL 50 MG: 50 TABLET ORAL at 08:06

## 2023-06-04 RX ADMIN — CARVEDILOL 25 MG: 25 TABLET, FILM COATED ORAL at 05:06

## 2023-06-04 RX ADMIN — CARVEDILOL 25 MG: 25 TABLET, FILM COATED ORAL at 08:06

## 2023-06-04 RX ADMIN — CILOSTAZOL 50 MG: 50 TABLET ORAL at 09:06

## 2023-06-04 RX ADMIN — BUPROPION HYDROCHLORIDE 150 MG: 150 TABLET, FILM COATED, EXTENDED RELEASE ORAL at 09:06

## 2023-06-04 RX ADMIN — SEVELAMER CARBONATE 800 MG: 800 TABLET, FILM COATED ORAL at 03:06

## 2023-06-04 RX ADMIN — HEPARIN SODIUM 5000 UNITS: 5000 INJECTION INTRAVENOUS; SUBCUTANEOUS at 01:06

## 2023-06-04 RX ADMIN — LOSARTAN POTASSIUM 100 MG: 100 TABLET, FILM COATED ORAL at 09:06

## 2023-06-04 RX ADMIN — PANTOPRAZOLE SODIUM 40 MG: 40 TABLET, DELAYED RELEASE ORAL at 08:06

## 2023-06-04 NOTE — NURSING
AAOX3 in bed accucheck 46. Drank 120ml orange juice, eating a small bowl of corn flakes. Skin warm dry to touch. Will continue to monitor.

## 2023-06-04 NOTE — NURSING
AAOX3 in bed skin warm dry to touch. Accucheck recheck 74. Will communicate status to oncoming shift. KARI castro

## 2023-06-04 NOTE — PROGRESS NOTES
Patient denies shortness of breath.  Review of systems GI denies nausea or vomiting    Physical exam general patient in no acute distress    Assessment/plan #1.  ESRD-continue HD support  2.  Osteomyelitis-continue present antibiotics and wound care  3.  Secondary hyperparathyroidism-continue Renvela  4.  Hypertension-continue present antihypertensives  5.  Diabetes mellitus-continue present hypoglycemic regimen, glucose was 214 today  6.  Anemia  7.  Peripheral vascular disease
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
## 2023-06-05 LAB
ANION GAP SERPL CALCULATED.3IONS-SCNC: 21 MMOL/L (ref 7–16)
BUN SERPL-MCNC: 67 MG/DL (ref 7–18)
BUN/CREAT SERPL: 13 (ref 6–20)
CALCIUM SERPL-MCNC: 8.8 MG/DL (ref 8.5–10.1)
CHLORIDE SERPL-SCNC: 102 MMOL/L (ref 98–107)
CO2 SERPL-SCNC: 24 MMOL/L (ref 21–32)
CREAT SERPL-MCNC: 5.2 MG/DL (ref 0.7–1.3)
EGFR (NO RACE VARIABLE) (RUSH/TITUS): 14 ML/MIN/1.73M2
GLUCOSE SERPL-MCNC: 139 MG/DL (ref 70–105)
GLUCOSE SERPL-MCNC: 154 MG/DL (ref 70–105)
GLUCOSE SERPL-MCNC: 167 MG/DL (ref 74–106)
GLUCOSE SERPL-MCNC: 225 MG/DL (ref 70–105)
GLUCOSE SERPL-MCNC: 87 MG/DL (ref 70–105)
POTASSIUM SERPL-SCNC: 5.5 MMOL/L (ref 3.5–5.1)
SODIUM SERPL-SCNC: 141 MMOL/L (ref 136–145)

## 2023-06-05 PROCEDURE — 82962 GLUCOSE BLOOD TEST: CPT

## 2023-06-05 PROCEDURE — 90935 HEMODIALYSIS ONE EVALUATION: CPT

## 2023-06-05 PROCEDURE — 25000003 PHARM REV CODE 250: Performed by: INTERNAL MEDICINE

## 2023-06-05 PROCEDURE — 99233 PR SUBSEQUENT HOSPITAL CARE,LEVL III: ICD-10-PCS | Mod: ,,, | Performed by: HOSPITALIST

## 2023-06-05 PROCEDURE — 25000003 PHARM REV CODE 250: Performed by: NURSE PRACTITIONER

## 2023-06-05 PROCEDURE — 80048 BASIC METABOLIC PNL TOTAL CA: CPT | Performed by: INTERNAL MEDICINE

## 2023-06-05 PROCEDURE — 96372 THER/PROPH/DIAG INJ SC/IM: CPT

## 2023-06-05 PROCEDURE — 63600175 PHARM REV CODE 636 W HCPCS: Performed by: NURSE PRACTITIONER

## 2023-06-05 PROCEDURE — 25000003 PHARM REV CODE 250: Performed by: HOSPITALIST

## 2023-06-05 PROCEDURE — 63600175 PHARM REV CODE 636 W HCPCS: Performed by: HOSPITALIST

## 2023-06-05 PROCEDURE — 25000003 PHARM REV CODE 250: Performed by: FAMILY MEDICINE

## 2023-06-05 PROCEDURE — 11000001 HC ACUTE MED/SURG PRIVATE ROOM

## 2023-06-05 PROCEDURE — 99233 SBSQ HOSP IP/OBS HIGH 50: CPT | Mod: ,,, | Performed by: HOSPITALIST

## 2023-06-05 PROCEDURE — 63600175 PHARM REV CODE 636 W HCPCS: Performed by: FAMILY MEDICINE

## 2023-06-05 RX ORDER — AMOXICILLIN AND CLAVULANATE POTASSIUM 500; 125 MG/1; MG/1
1 TABLET, FILM COATED ORAL DAILY
Status: DISCONTINUED | OUTPATIENT
Start: 2023-06-05 | End: 2023-06-06

## 2023-06-05 RX ADMIN — INSULIN ASPART 4 UNITS: 100 INJECTION, SOLUTION INTRAVENOUS; SUBCUTANEOUS at 06:06

## 2023-06-05 RX ADMIN — PANTOPRAZOLE SODIUM 40 MG: 40 TABLET, DELAYED RELEASE ORAL at 08:06

## 2023-06-05 RX ADMIN — INSULIN ASPART 4 UNITS: 100 INJECTION, SOLUTION INTRAVENOUS; SUBCUTANEOUS at 01:06

## 2023-06-05 RX ADMIN — ISOSORBIDE MONONITRATE 60 MG: 60 TABLET, EXTENDED RELEASE ORAL at 08:06

## 2023-06-05 RX ADMIN — SODIUM CHLORIDE 1000 ML: 9 INJECTION, SOLUTION INTRAVENOUS at 12:06

## 2023-06-05 RX ADMIN — HEPARIN SODIUM 5000 UNITS: 5000 INJECTION INTRAVENOUS; SUBCUTANEOUS at 06:06

## 2023-06-05 RX ADMIN — AMLODIPINE BESYLATE 10 MG: 10 TABLET ORAL at 08:06

## 2023-06-05 RX ADMIN — AMPICILLIN SODIUM 2 G: 2 INJECTION, POWDER, FOR SOLUTION INTRAVENOUS at 06:06

## 2023-06-05 RX ADMIN — SEVELAMER CARBONATE 800 MG: 800 TABLET, FILM COATED ORAL at 04:06

## 2023-06-05 RX ADMIN — FERROUS SULFATE TAB 325 MG (65 MG ELEMENTAL FE) 1 EACH: 325 (65 FE) TAB at 08:06

## 2023-06-05 RX ADMIN — CARVEDILOL 25 MG: 25 TABLET, FILM COATED ORAL at 04:06

## 2023-06-05 RX ADMIN — DOCUSATE SODIUM 100 MG: 100 CAPSULE, LIQUID FILLED ORAL at 08:06

## 2023-06-05 RX ADMIN — CILOSTAZOL 50 MG: 50 TABLET ORAL at 08:06

## 2023-06-05 RX ADMIN — BUPROPION HYDROCHLORIDE 150 MG: 150 TABLET, FILM COATED, EXTENDED RELEASE ORAL at 08:06

## 2023-06-05 RX ADMIN — HEPARIN SODIUM 5000 UNITS: 5000 INJECTION INTRAVENOUS; SUBCUTANEOUS at 01:06

## 2023-06-05 RX ADMIN — SEVELAMER CARBONATE 800 MG: 800 TABLET, FILM COATED ORAL at 06:06

## 2023-06-05 RX ADMIN — LOSARTAN POTASSIUM 100 MG: 100 TABLET, FILM COATED ORAL at 09:06

## 2023-06-05 RX ADMIN — AMOXICILLIN AND CLAVULANATE POTASSIUM 500 MG: 500; 125 TABLET, FILM COATED ORAL at 09:06

## 2023-06-05 RX ADMIN — INSULIN ASPART 4 UNITS: 100 INJECTION, SOLUTION INTRAVENOUS; SUBCUTANEOUS at 04:06

## 2023-06-05 RX ADMIN — HEPARIN SODIUM 5000 UNITS: 5000 INJECTION INTRAVENOUS; SUBCUTANEOUS at 09:06

## 2023-06-05 RX ADMIN — BUPROPION HYDROCHLORIDE 150 MG: 150 TABLET, FILM COATED, EXTENDED RELEASE ORAL at 09:06

## 2023-06-05 RX ADMIN — CARVEDILOL 25 MG: 25 TABLET, FILM COATED ORAL at 08:06

## 2023-06-05 RX ADMIN — CILOSTAZOL 50 MG: 50 TABLET ORAL at 09:06

## 2023-06-05 RX ADMIN — SEVELAMER CARBONATE 800 MG: 800 TABLET, FILM COATED ORAL at 01:06

## 2023-06-05 NOTE — SUBJECTIVE & OBJECTIVE
Interval History:     Review of Systems   Constitutional:  Negative for appetite change, fatigue and fever.   HENT:  Negative for congestion, hearing loss and trouble swallowing.    Respiratory:  Negative for chest tightness, shortness of breath and wheezing.    Cardiovascular:  Negative for chest pain and palpitations.   Gastrointestinal:  Positive for constipation. Negative for abdominal pain and nausea.   Genitourinary:  Positive for difficulty urinating. Negative for dysuria.   Musculoskeletal:  Positive for gait problem. Negative for back pain and neck stiffness.   Skin:  Positive for wound. Negative for pallor and rash.   Neurological:  Positive for weakness. Negative for dizziness, speech difficulty and headaches.   Psychiatric/Behavioral:  Negative for confusion and suicidal ideas.    Objective:     Vital Signs (Most Recent):  Temp: 97.8 °F (36.6 °C) (06/05/23 1245)  Pulse: 87 (06/05/23 1245)  Resp: 17 (06/05/23 1245)  BP: (!) 165/76 (06/05/23 1659)  SpO2: 97 % (06/05/23 0758) Vital Signs (24h Range):  Temp:  [97.5 °F (36.4 °C)-98.3 °F (36.8 °C)] 97.8 °F (36.6 °C)  Pulse:  [77-90] 87  Resp:  [17-18] 17  SpO2:  [95 %-97 %] 97 %  BP: (143-182)/() 165/76     Weight: 78.3 kg (172 lb 9.9 oz)  Body mass index is 30.58 kg/m².    Intake/Output Summary (Last 24 hours) at 6/5/2023 1723  Last data filed at 6/5/2023 1245  Gross per 24 hour   Intake 500 ml   Output 4500 ml   Net -4000 ml           Physical Exam  Vitals reviewed.   Constitutional:       General: He is awake. He is not in acute distress.     Appearance: He is well-developed. He is not toxic-appearing.   HENT:      Head: Normocephalic.      Nose: Nose normal.      Mouth/Throat:      Pharynx: Oropharynx is clear.   Eyes:      Extraocular Movements: Extraocular movements intact.      Pupils: Pupils are equal, round, and reactive to light.   Neck:      Thyroid: No thyroid mass.      Vascular: No carotid bruit.   Cardiovascular:      Rate and Rhythm:  Normal rate and regular rhythm.      Pulses: Normal pulses.      Heart sounds: Normal heart sounds. No murmur heard.  Pulmonary:      Effort: Pulmonary effort is normal.      Breath sounds: Normal breath sounds and air entry. No wheezing.   Abdominal:      General: Bowel sounds are normal. There is no distension.      Palpations: Abdomen is soft.      Tenderness: There is no abdominal tenderness.   Musculoskeletal:         General: Signs of injury present. Normal range of motion.      Cervical back: Neck supple. No rigidity.      Comments: Wounds to 1st and 2nd digit left foot that are currently dressed   Skin:     General: Skin is warm.      Coloration: Skin is not jaundiced.      Findings: No lesion.   Neurological:      Mental Status: He is alert and oriented to person, place, and time.      Cranial Nerves: No cranial nerve deficit.      Motor: Weakness present.      Gait: Gait abnormal.      Comments:  1 over 5  weakness to bilateral lower extremities   Psychiatric:         Attention and Perception: Attention normal.         Mood and Affect: Mood normal.         Behavior: Behavior normal. Behavior is cooperative.         Thought Content: Thought content normal.         Cognition and Memory: Cognition normal.           Significant Labs: All pertinent labs within the past 24 hours have been reviewed.  BMP:   Recent Labs   Lab 06/05/23  1009   *      K 5.5*      CO2 24   BUN 67*   CREATININE 5.20*   CALCIUM 8.8     CBC: No results for input(s): WBC, HGB, HCT, PLT in the last 48 hours.  CMP:   Recent Labs   Lab 06/05/23  1009      K 5.5*      CO2 24   *   BUN 67*   CREATININE 5.20*   CALCIUM 8.8   ANIONGAP 21*       Significant Imaging: I have reviewed all pertinent imaging results/findings within the past 24 hours.    Microbiology Results (last 7 days)       ** No results found for the last 168 hours. **          Intake/Output - Last 3 Shifts         06/03 0700  06/04 0659  06/04 0700  06/05 0659 06/05 0700  06/06 0659    P.O.  480 0    Other   500    IV Piggyback       Total Intake(mL/kg)  480 (6.1) 500 (6.4)    Other   4500    Total Output   4500    Net  +480 -4000           Stool Occurrence 1 x 3 x
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
## 2023-06-05 NOTE — PLAN OF CARE
Plans of care ongoing  Problem: Adult Inpatient Plan of Care  Goal: Plan of Care Review  Outcome: Ongoing, Progressing  Goal: Patient-Specific Goal (Individualized)  Outcome: Ongoing, Progressing  Goal: Absence of Hospital-Acquired Illness or Injury  Outcome: Ongoing, Progressing  Goal: Optimal Comfort and Wellbeing  Outcome: Ongoing, Progressing  Goal: Readiness for Transition of Care  Outcome: Ongoing, Progressing     Problem: Diabetes Comorbidity  Goal: Blood Glucose Level Within Targeted Range  Outcome: Ongoing, Progressing     Problem: Impaired Wound Healing  Goal: Optimal Wound Healing  Outcome: Ongoing, Progressing     Problem: Fall Injury Risk  Goal: Absence of Fall and Fall-Related Injury  Outcome: Ongoing, Progressing     Problem: Infection  Goal: Absence of Infection Signs and Symptoms  Outcome: Ongoing, Progressing     Problem: Skin Injury Risk Increased  Goal: Skin Health and Integrity  Outcome: Ongoing, Progressing     Problem: Device-Related Complication Risk (Hemodialysis)  Goal: Safe, Effective Therapy Delivery  Outcome: Ongoing, Progressing     Problem: Hemodynamic Instability (Hemodialysis)  Goal: Effective Tissue Perfusion  Outcome: Ongoing, Progressing     Problem: Infection (Hemodialysis)  Goal: Absence of Infection Signs and Symptoms  Outcome: Ongoing, Progressing

## 2023-06-05 NOTE — PLAN OF CARE
Problem: Adult Inpatient Plan of Care  Goal: Plan of Care Review  6/5/2023 1035 by Erendira Renteria RN  Outcome: Ongoing, Progressing  6/5/2023 1034 by Erendira Renteria RN  Outcome: Ongoing, Progressing  6/5/2023 1034 by Erendira Renteria RN  Outcome: Ongoing, Progressing  Goal: Patient-Specific Goal (Individualized)  6/5/2023 1035 by Erendira Renteria RN  Outcome: Ongoing, Progressing  6/5/2023 1034 by Erendira Renteria RN  Outcome: Ongoing, Progressing  6/5/2023 1034 by Erendira Renteria RN  Outcome: Ongoing, Progressing  Goal: Absence of Hospital-Acquired Illness or Injury  6/5/2023 1035 by Erendira Renteria RN  Outcome: Ongoing, Progressing  6/5/2023 1034 by Erendira Renteria RN  Outcome: Ongoing, Progressing  6/5/2023 1034 by Erendira Renteria RN  Outcome: Ongoing, Progressing  Goal: Optimal Comfort and Wellbeing  6/5/2023 1035 by Erendira Renteria RN  Outcome: Ongoing, Progressing  6/5/2023 1034 by Erendira Renteria RN  Outcome: Ongoing, Progressing  6/5/2023 1034 by Erendira Renteria RN  Outcome: Ongoing, Progressing  Goal: Readiness for Transition of Care  6/5/2023 1035 by Erendira Renteria RN  Outcome: Ongoing, Progressing  6/5/2023 1034 by Erendira Renteria RN  Outcome: Ongoing, Progressing  6/5/2023 1034 by Erendira Renteria RN  Outcome: Ongoing, Progressing     Problem: Diabetes Comorbidity  Goal: Blood Glucose Level Within Targeted Range  6/5/2023 1035 by Erendira Renteria RN  Outcome: Ongoing, Progressing  6/5/2023 1034 by Erendira Renteria RN  Outcome: Ongoing, Progressing  6/5/2023 1034 by Erendira Renteria RN  Outcome: Ongoing, Progressing     Problem: Impaired Wound Healing  Goal: Optimal Wound Healing  6/5/2023 1035 by Erendira Renteria RN  Outcome: Ongoing, Progressing  6/5/2023 1034 by Erendira Renteria RN  Outcome: Ongoing, Progressing  6/5/2023 1034 by Erendira Renteria RN  Outcome: Ongoing, Progressing     Problem: Fall Injury Risk  Goal: Absence of Fall and Fall-Related Injury  6/5/2023 1035 by Erendira Renteria RN  Outcome: Ongoing, Progressing  6/5/2023 1034 by Erendira Renteria RN  Outcome: Ongoing,  Progressing  6/5/2023 1034 by Erendira Renteria RN  Outcome: Ongoing, Progressing     Problem: Infection  Goal: Absence of Infection Signs and Symptoms  6/5/2023 1035 by Erendira Renteria RN  Outcome: Ongoing, Progressing  6/5/2023 1034 by Erendira Renteria RN  Outcome: Ongoing, Progressing  6/5/2023 1034 by Erendira Renteria RN  Outcome: Ongoing, Progressing     Problem: Skin Injury Risk Increased  Goal: Skin Health and Integrity  6/5/2023 1035 by Erendira Renteria RN  Outcome: Ongoing, Progressing  6/5/2023 1034 by Erendira Renteria RN  Outcome: Ongoing, Progressing  6/5/2023 1034 by Erendira Renteria RN  Outcome: Ongoing, Progressing     Problem: Device-Related Complication Risk (Hemodialysis)  Goal: Safe, Effective Therapy Delivery  6/5/2023 1035 by Erendira Renteria RN  Outcome: Ongoing, Progressing  6/5/2023 1034 by Erendira Renteria RN  Outcome: Ongoing, Progressing  6/5/2023 1034 by Erendira Renteria RN  Outcome: Ongoing, Progressing     Problem: Hemodynamic Instability (Hemodialysis)  Goal: Effective Tissue Perfusion  6/5/2023 1035 by Erendira Renteria RN  Outcome: Ongoing, Progressing  6/5/2023 1034 by Erendira Renteria RN  Outcome: Ongoing, Progressing  6/5/2023 1034 by Erendira Renteria RN  Outcome: Ongoing, Progressing     Problem: Infection (Hemodialysis)  Goal: Absence of Infection Signs and Symptoms  6/5/2023 1035 by Erendira Renteria RN  Outcome: Ongoing, Progressing  6/5/2023 1034 by Erendira Renteria RN  Outcome: Ongoing, Progressing  6/5/2023 1034 by Erendira Renteria RN  Outcome: Ongoing, Progressing

## 2023-06-05 NOTE — PLAN OF CARE
Problem: Adult Inpatient Plan of Care  Goal: Plan of Care Review  6/5/2023 1034 by Erendira Renteria RN  Outcome: Ongoing, Progressing  6/5/2023 1034 by Erendira Renteria RN  Outcome: Ongoing, Progressing  Goal: Patient-Specific Goal (Individualized)  6/5/2023 1034 by Erendira Renteria RN  Outcome: Ongoing, Progressing  6/5/2023 1034 by Erendira Renteria RN  Outcome: Ongoing, Progressing  Goal: Absence of Hospital-Acquired Illness or Injury  6/5/2023 1034 by Erendira Renteria RN  Outcome: Ongoing, Progressing  6/5/2023 1034 by Erendira Renteria RN  Outcome: Ongoing, Progressing  Goal: Optimal Comfort and Wellbeing  6/5/2023 1034 by Erendira Renteria RN  Outcome: Ongoing, Progressing  6/5/2023 1034 by Erendira Renteria RN  Outcome: Ongoing, Progressing  Goal: Readiness for Transition of Care  6/5/2023 1034 by Erendira Renteria RN  Outcome: Ongoing, Progressing  6/5/2023 1034 by Erendira Renteria RN  Outcome: Ongoing, Progressing     Problem: Diabetes Comorbidity  Goal: Blood Glucose Level Within Targeted Range  6/5/2023 1034 by Erendira Renteria RN  Outcome: Ongoing, Progressing  6/5/2023 1034 by Erendira Renteria RN  Outcome: Ongoing, Progressing     Problem: Impaired Wound Healing  Goal: Optimal Wound Healing  6/5/2023 1034 by Erendira Renteria RN  Outcome: Ongoing, Progressing  6/5/2023 1034 by Erendira Renteria RN  Outcome: Ongoing, Progressing     Problem: Fall Injury Risk  Goal: Absence of Fall and Fall-Related Injury  6/5/2023 1034 by Erendira Renteria RN  Outcome: Ongoing, Progressing  6/5/2023 1034 by Erendira Renteria RN  Outcome: Ongoing, Progressing     Problem: Infection  Goal: Absence of Infection Signs and Symptoms  6/5/2023 1034 by Erendira Renteria RN  Outcome: Ongoing, Progressing  6/5/2023 1034 by Erendira Renteria RN  Outcome: Ongoing, Progressing     Problem: Skin Injury Risk Increased  Goal: Skin Health and Integrity  6/5/2023 1034 by Erendira Renteria RN  Outcome: Ongoing, Progressing  6/5/2023 1034 by Erendira Renteria RN  Outcome: Ongoing, Progressing     Problem: Device-Related Complication Risk  (Hemodialysis)  Goal: Safe, Effective Therapy Delivery  6/5/2023 1034 by Erendira Renteria RN  Outcome: Ongoing, Progressing  6/5/2023 1034 by Erendira Renteria RN  Outcome: Ongoing, Progressing     Problem: Hemodynamic Instability (Hemodialysis)  Goal: Effective Tissue Perfusion  6/5/2023 1034 by Erendira Renteria RN  Outcome: Ongoing, Progressing  6/5/2023 1034 by Erendira Renteria RN  Outcome: Ongoing, Progressing     Problem: Infection (Hemodialysis)  Goal: Absence of Infection Signs and Symptoms  6/5/2023 1034 by Erendira Renteria RN  Outcome: Ongoing, Progressing  6/5/2023 1034 by Erendira Renteria RN  Outcome: Ongoing, Progressing

## 2023-06-06 ENCOUNTER — HOSPITAL ENCOUNTER (OUTPATIENT)
Dept: RADIOLOGY | Facility: HOSPITAL | Age: 38
Discharge: HOME | End: 2023-06-06
Attending: HOSPITALIST
Payer: MEDICARE

## 2023-06-06 PROBLEM — I73.9 PVD (PERIPHERAL VASCULAR DISEASE): Status: ACTIVE | Noted: 2023-06-06

## 2023-06-06 LAB
GLUCOSE SERPL-MCNC: 149 MG/DL (ref 70–105)
GLUCOSE SERPL-MCNC: 156 MG/DL (ref 70–105)
GLUCOSE SERPL-MCNC: 205 MG/DL (ref 70–105)
GLUCOSE SERPL-MCNC: 301 MG/DL (ref 70–105)

## 2023-06-06 PROCEDURE — 99223 1ST HOSP IP/OBS HIGH 75: CPT | Mod: ,,, | Performed by: NURSE PRACTITIONER

## 2023-06-06 PROCEDURE — 76937 US GUIDE VASCULAR ACCESS: CPT | Mod: TC

## 2023-06-06 PROCEDURE — C1751 CATH, INF, PER/CENT/MIDLINE: HCPCS

## 2023-06-06 PROCEDURE — 25000003 PHARM REV CODE 250: Performed by: HOSPITALIST

## 2023-06-06 PROCEDURE — 63600175 PHARM REV CODE 636 W HCPCS: Performed by: HOSPITALIST

## 2023-06-06 PROCEDURE — 99232 SBSQ HOSP IP/OBS MODERATE 35: CPT | Mod: ,,, | Performed by: HOSPITALIST

## 2023-06-06 PROCEDURE — 99232 PR SUBSEQUENT HOSPITAL CARE,LEVL II: ICD-10-PCS | Mod: ,,, | Performed by: HOSPITALIST

## 2023-06-06 PROCEDURE — 76937 US GUIDE VASCULAR ACCESS: CPT

## 2023-06-06 PROCEDURE — 25000003 PHARM REV CODE 250: Performed by: NURSE PRACTITIONER

## 2023-06-06 PROCEDURE — 63600175 PHARM REV CODE 636 W HCPCS: Performed by: NURSE PRACTITIONER

## 2023-06-06 PROCEDURE — 25000003 PHARM REV CODE 250: Performed by: FAMILY MEDICINE

## 2023-06-06 PROCEDURE — 99223 PR INITIAL HOSPITAL CARE,LEVL III: ICD-10-PCS | Mod: ,,, | Performed by: NURSE PRACTITIONER

## 2023-06-06 PROCEDURE — 36573 INSJ PICC RS&I 5 YR+: CPT

## 2023-06-06 PROCEDURE — 11000001 HC ACUTE MED/SURG PRIVATE ROOM

## 2023-06-06 PROCEDURE — 82962 GLUCOSE BLOOD TEST: CPT

## 2023-06-06 RX ORDER — CLONIDINE HYDROCHLORIDE 0.1 MG/1
0.1 TABLET ORAL 3 TIMES DAILY
Status: DISCONTINUED | OUTPATIENT
Start: 2023-06-06 | End: 2023-06-26

## 2023-06-06 RX ORDER — AMOXICILLIN AND CLAVULANATE POTASSIUM 500; 125 MG/1; MG/1
1 TABLET, FILM COATED ORAL DAILY
Status: DISCONTINUED | OUTPATIENT
Start: 2023-06-07 | End: 2023-06-10

## 2023-06-06 RX ADMIN — HEPARIN SODIUM 5000 UNITS: 5000 INJECTION INTRAVENOUS; SUBCUTANEOUS at 06:06

## 2023-06-06 RX ADMIN — HEPARIN SODIUM 5000 UNITS: 5000 INJECTION INTRAVENOUS; SUBCUTANEOUS at 09:06

## 2023-06-06 RX ADMIN — BUPROPION HYDROCHLORIDE 150 MG: 150 TABLET, FILM COATED, EXTENDED RELEASE ORAL at 09:06

## 2023-06-06 RX ADMIN — INSULIN ASPART 4 UNITS: 100 INJECTION, SOLUTION INTRAVENOUS; SUBCUTANEOUS at 06:06

## 2023-06-06 RX ADMIN — INSULIN ASPART 4 UNITS: 100 INJECTION, SOLUTION INTRAVENOUS; SUBCUTANEOUS at 11:06

## 2023-06-06 RX ADMIN — HEPARIN SODIUM 5000 UNITS: 5000 INJECTION INTRAVENOUS; SUBCUTANEOUS at 02:06

## 2023-06-06 RX ADMIN — SEVELAMER CARBONATE 800 MG: 800 TABLET, FILM COATED ORAL at 11:06

## 2023-06-06 RX ADMIN — AMOXICILLIN AND CLAVULANATE POTASSIUM 500 MG: 500; 125 TABLET, FILM COATED ORAL at 08:06

## 2023-06-06 RX ADMIN — DOCUSATE SODIUM 100 MG: 100 CAPSULE, LIQUID FILLED ORAL at 08:06

## 2023-06-06 RX ADMIN — CLONIDINE HYDROCHLORIDE 0.1 MG: 0.1 TABLET ORAL at 09:06

## 2023-06-06 RX ADMIN — INSULIN ASPART 4 UNITS: 100 INJECTION, SOLUTION INTRAVENOUS; SUBCUTANEOUS at 04:06

## 2023-06-06 RX ADMIN — PANTOPRAZOLE SODIUM 40 MG: 40 TABLET, DELAYED RELEASE ORAL at 08:06

## 2023-06-06 RX ADMIN — BUPROPION HYDROCHLORIDE 150 MG: 150 TABLET, FILM COATED, EXTENDED RELEASE ORAL at 08:06

## 2023-06-06 RX ADMIN — SEVELAMER CARBONATE 800 MG: 800 TABLET, FILM COATED ORAL at 04:06

## 2023-06-06 RX ADMIN — ISOSORBIDE MONONITRATE 60 MG: 60 TABLET, EXTENDED RELEASE ORAL at 08:06

## 2023-06-06 RX ADMIN — CARVEDILOL 25 MG: 25 TABLET, FILM COATED ORAL at 08:06

## 2023-06-06 RX ADMIN — FERROUS SULFATE TAB 325 MG (65 MG ELEMENTAL FE) 1 EACH: 325 (65 FE) TAB at 08:06

## 2023-06-06 RX ADMIN — SEVELAMER CARBONATE 800 MG: 800 TABLET, FILM COATED ORAL at 06:06

## 2023-06-06 RX ADMIN — LOSARTAN POTASSIUM 100 MG: 100 TABLET, FILM COATED ORAL at 09:06

## 2023-06-06 RX ADMIN — CARVEDILOL 25 MG: 25 TABLET, FILM COATED ORAL at 05:06

## 2023-06-06 RX ADMIN — LACTULOSE 20 G: 20 SOLUTION ORAL at 08:06

## 2023-06-06 RX ADMIN — INSULIN DETEMIR 15 UNITS: 100 INJECTION, SOLUTION SUBCUTANEOUS at 09:06

## 2023-06-06 RX ADMIN — AMLODIPINE BESYLATE 10 MG: 10 TABLET ORAL at 08:06

## 2023-06-06 RX ADMIN — CILOSTAZOL 50 MG: 50 TABLET ORAL at 08:06
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
## 2023-06-06 NOTE — PROGRESS NOTES
Vascular surgery    NPO after midnight for pus left lower extremity angiogram with possible intervention if patient agreeable will discuss with patient
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
## 2023-06-06 NOTE — ASSESSMENT & PLAN NOTE
06/06/2023 Teresa stage 5  Left MIGUELITO 0.43 with nonhealing wounds to left foot  Formal angiogram tomorrow with Dr. Trinidad after hemodialysis  NPO after midnight  Type and screen in the a.m.  Placed consent on front of chart  Discussed with patient pros cons risks benefits to include bleeding, infection possible injury to vessel, possible limb loss possible unable to improve vascular circulation  Patient with nonambulatory status also discussed amputation as an option, he is not agreeable at this time
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
## 2023-06-07 LAB
GLUCOSE SERPL-MCNC: 125 MG/DL (ref 70–105)
GLUCOSE SERPL-MCNC: 140 MG/DL (ref 70–105)
GLUCOSE SERPL-MCNC: 155 MG/DL (ref 70–105)
GLUCOSE SERPL-MCNC: 55 MG/DL (ref 70–105)
GLUCOSE SERPL-MCNC: 84 MG/DL (ref 70–105)

## 2023-06-07 PROCEDURE — 25000003 PHARM REV CODE 250: Performed by: NURSE PRACTITIONER

## 2023-06-07 PROCEDURE — 99232 PR SUBSEQUENT HOSPITAL CARE,LEVL II: ICD-10-PCS | Mod: ,,, | Performed by: NURSE PRACTITIONER

## 2023-06-07 PROCEDURE — 25000003 PHARM REV CODE 250: Performed by: INTERNAL MEDICINE

## 2023-06-07 PROCEDURE — 63600175 PHARM REV CODE 636 W HCPCS: Performed by: NURSE PRACTITIONER

## 2023-06-07 PROCEDURE — 99232 SBSQ HOSP IP/OBS MODERATE 35: CPT | Mod: ,,, | Performed by: NURSE PRACTITIONER

## 2023-06-07 PROCEDURE — 25000003 PHARM REV CODE 250: Performed by: FAMILY MEDICINE

## 2023-06-07 PROCEDURE — 25000003 PHARM REV CODE 250: Performed by: HOSPITALIST

## 2023-06-07 PROCEDURE — 82962 GLUCOSE BLOOD TEST: CPT

## 2023-06-07 PROCEDURE — 11000001 HC ACUTE MED/SURG PRIVATE ROOM

## 2023-06-07 PROCEDURE — 99232 SBSQ HOSP IP/OBS MODERATE 35: CPT | Mod: ,,, | Performed by: HOSPITALIST

## 2023-06-07 PROCEDURE — 90935 HEMODIALYSIS ONE EVALUATION: CPT

## 2023-06-07 PROCEDURE — 99232 PR SUBSEQUENT HOSPITAL CARE,LEVL II: ICD-10-PCS | Mod: ,,, | Performed by: HOSPITALIST

## 2023-06-07 PROCEDURE — 63600175 PHARM REV CODE 636 W HCPCS: Performed by: HOSPITALIST

## 2023-06-07 RX ADMIN — AMOXICILLIN AND CLAVULANATE POTASSIUM 500 MG: 500; 125 TABLET, FILM COATED ORAL at 09:06

## 2023-06-07 RX ADMIN — CLONIDINE HYDROCHLORIDE 0.1 MG: 0.1 TABLET ORAL at 03:06

## 2023-06-07 RX ADMIN — CLONIDINE HYDROCHLORIDE 0.1 MG: 0.1 TABLET ORAL at 08:06

## 2023-06-07 RX ADMIN — HEPARIN SODIUM 5000 UNITS: 5000 INJECTION INTRAVENOUS; SUBCUTANEOUS at 09:06

## 2023-06-07 RX ADMIN — HEPARIN SODIUM 5000 UNITS: 5000 INJECTION INTRAVENOUS; SUBCUTANEOUS at 03:06

## 2023-06-07 RX ADMIN — ISOSORBIDE MONONITRATE 60 MG: 60 TABLET, EXTENDED RELEASE ORAL at 08:06

## 2023-06-07 RX ADMIN — LACTULOSE 20 G: 20 SOLUTION ORAL at 03:06

## 2023-06-07 RX ADMIN — DOCUSATE SODIUM 100 MG: 100 CAPSULE, LIQUID FILLED ORAL at 08:06

## 2023-06-07 RX ADMIN — CLONIDINE HYDROCHLORIDE 0.1 MG: 0.1 TABLET ORAL at 09:06

## 2023-06-07 RX ADMIN — INSULIN ASPART 4 UNITS: 100 INJECTION, SOLUTION INTRAVENOUS; SUBCUTANEOUS at 05:06

## 2023-06-07 RX ADMIN — FERROUS SULFATE TAB 325 MG (65 MG ELEMENTAL FE) 1 EACH: 325 (65 FE) TAB at 08:06

## 2023-06-07 RX ADMIN — SEVELAMER CARBONATE 800 MG: 800 TABLET, FILM COATED ORAL at 05:06

## 2023-06-07 RX ADMIN — AMLODIPINE BESYLATE 10 MG: 10 TABLET ORAL at 08:06

## 2023-06-07 RX ADMIN — BUPROPION HYDROCHLORIDE 150 MG: 150 TABLET, FILM COATED, EXTENDED RELEASE ORAL at 09:06

## 2023-06-07 RX ADMIN — DEXTROSE MONOHYDRATE 25 G: 25 INJECTION, SOLUTION INTRAVENOUS at 06:06

## 2023-06-07 RX ADMIN — CARVEDILOL 25 MG: 25 TABLET, FILM COATED ORAL at 05:06

## 2023-06-07 RX ADMIN — BUPROPION HYDROCHLORIDE 150 MG: 150 TABLET, FILM COATED, EXTENDED RELEASE ORAL at 08:06

## 2023-06-07 RX ADMIN — LOSARTAN POTASSIUM 100 MG: 100 TABLET, FILM COATED ORAL at 09:06

## 2023-06-07 RX ADMIN — SODIUM CHLORIDE: 9 INJECTION, SOLUTION INTRAVENOUS at 09:06

## 2023-06-07 RX ADMIN — PANTOPRAZOLE SODIUM 40 MG: 40 TABLET, DELAYED RELEASE ORAL at 08:06

## 2023-06-07 NOTE — DIALYSIS ROUNDING
The patient was dialyzed today without complication.There was a net fluid removal of 2185 ml today.    PE  Lungs-clear  Cor-no murmur or rub  Abd-soft    Imp:  CKD stage 6 on hemodialysis with osteomyelitis    Plan:  Will dialyze the patient after the angiogram is performed in order to remove the contrast.

## 2023-06-07 NOTE — PROGRESS NOTES
[facility]  Nephrology  Progress Note    Patient Name: Lee Escobar  MRN: [id number]  Admission Date: 5/26/2023  Hospital Length of Stay: 11 days  Attending Provider: Shan Roa MD   Primary Care Physician: Maria Elena Solorio II, MD  Principal Problem:Osteomyelitis of left foot    Consults  Subjective:     Interval History: The patient has no complaints today    Review of patient's allergies indicates:  No Known Allergies  Current Facility-Administered Medications   Medication Frequency    0.9%  NaCl infusion PRN    acetaminophen tablet 650 mg Q4H PRN    ALPRAZolam tablet 0.5 mg TID PRN    amLODIPine tablet 10 mg Daily    [START ON 6/7/2023] amoxicillin-clavulanate 500-125mg per tablet 500 mg Daily    buPROPion TBSR 12 hr tablet 150 mg BID    carvediloL tablet 25 mg BID WM    cloNIDine tablet 0.1 mg TID    collagenase ointment Daily PRN    dextrose 50% injection 12.5 g PRN    dextrose 50% injection 25 g PRN    docusate sodium capsule 100 mg BID    ferrous sulfate tablet 1 each Daily    glucagon (human recombinant) injection 1 mg PRN    glucagon (human recombinant) injection 1 mg PRN    glucose chewable tablet 16 g PRN    glucose chewable tablet 24 g PRN    heparin (porcine) injection 5,000 Units Q8H    hydrALAZINE tablet 25 mg Q8H PRN    HYDROcodone-acetaminophen 5-325 mg per tablet 1 tablet Q6H PRN    insulin aspart U-100 injection 0-5 Units QID (AC + HS) PRN    insulin aspart U-100 injection 4 Units TIDWM    insulin detemir U-100 injection 15 Units QHS    isosorbide mononitrate 24 hr tablet 60 mg Daily    lactulose 20 gram/30 mL solution Soln 20 g TID    linaCLOtide capsule 145 mcg Daily PRN    losartan tablet 100 mg QHS    melatonin tablet 6 mg Nightly PRN    naloxone 0.4 mg/mL injection 0.02 mg PRN    ondansetron injection 4 mg Q8H PRN    pantoprazole EC tablet 40 mg Daily    sevelamer carbonate tablet 800 mg TIDWM    sodium chloride 0.9% flush 10 mL Q12H PRN       Objective:      Vital Signs (Most Recent):  Temp: 98.2 °F (36.8 °C) (06/06/23 1952)  Pulse: 78 (06/06/23 1952)  Resp: 18 (06/06/23 1952)  BP: (!) 150/66 (06/06/23 1952)  SpO2: 97 % (06/06/23 1952) Vital Signs (24h Range):  Temp:  [97.8 °F (36.6 °C)-98.8 °F (37.1 °C)] 98.2 °F (36.8 °C)  Pulse:  [78-88] 78  Resp:  [18-20] 18  SpO2:  [96 %-100 %] 97 %  BP: (150-191)/(63-96) 150/66     Weight: 75 kg (165 lb 5.5 oz) (06/06/23 0400)  Body mass index is 29.29 kg/m².  Body surface area is 1.83 meters squared.    I/O last 3 completed shifts:  In: 1340 [P.O.:840; Other:500]  Out: 4500 [Other:4500]    Physical Exam  Lungs-clear  Cor-no rub  Abd-soft    Significant Labs:sureCBC: No results for input(s): WBC, RBC, HGB, HCT, PLT, MCV, MCH, MCHC in the last 168 hours.  CMP:   Recent Labs   Lab 06/05/23  1009   *   CALCIUM 8.8      K 5.5*   CO2 24      BUN 67*   CREATININE 5.20*     All labs within the past 24 hours have been reviewed.    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]  Not Applicable     Chronic    Diabetic ulcer of toe of left foot associated with type 2 diabetes mellitus, with bone involvement without evidence of necrosis [E11.621, L97.526] 05/31/2023 Yes    Pressure injury of left heel, unstageable [L89.620] 05/31/2023 Yes    Pressure injury of right buttock, stage 2 [L89.312] 05/31/2023 Yes    HTN (hypertension) [I10] 05/25/2023 Yes     Chronic    Type 2 diabetes mellitus with kidney complication, with long-term current use of insulin [E11.29, Z79.4] 05/25/2023 Not Applicable     Chronic    Constipation [K59.00] 05/25/2023 Yes     Chronic    ESRD (end stage renal disease) [N18.6] 05/25/2023 Yes     Chronic    Wound infection [T14.8XXA, L08.9] 05/25/2023 Yes    Major depression  [F32.9] 05/25/2023 Yes      Problems Resolved During this Admission:       Plan:  Hemodialysis tomorrow    Thank you for your consult. I will follow-up with patient. Please contact us if you have any additional questions.    Danny Peter MD  Nephrology  [facility]

## 2023-06-07 NOTE — SUBJECTIVE & OBJECTIVE
Interval History:     Review of Systems   Constitutional:  Negative for appetite change, fatigue and fever.   HENT:  Negative for congestion, hearing loss and trouble swallowing.    Respiratory:  Negative for chest tightness, shortness of breath and wheezing.    Cardiovascular:  Negative for chest pain and palpitations.   Gastrointestinal:  Positive for constipation. Negative for abdominal pain and nausea.   Genitourinary:  Positive for difficulty urinating. Negative for dysuria.   Musculoskeletal:  Positive for gait problem. Negative for back pain and neck stiffness.   Skin:  Positive for wound. Negative for pallor and rash.   Neurological:  Positive for weakness. Negative for dizziness, speech difficulty and headaches.   Psychiatric/Behavioral:  Negative for confusion and suicidal ideas.    Objective:     Vital Signs (Most Recent):  Temp: 98.2 °F (36.8 °C) (06/06/23 1952)  Pulse: 78 (06/06/23 1952)  Resp: 18 (06/06/23 1952)  BP: (!) 150/66 (06/06/23 1952)  SpO2: 97 % (06/06/23 1952) Vital Signs (24h Range):  Temp:  [97.8 °F (36.6 °C)-98.8 °F (37.1 °C)] 98.2 °F (36.8 °C)  Pulse:  [78-88] 78  Resp:  [18-20] 18  SpO2:  [96 %-100 %] 97 %  BP: (150-191)/(63-96) 150/66     Weight: 75 kg (165 lb 5.5 oz)  Body mass index is 29.29 kg/m².    Intake/Output Summary (Last 24 hours) at 6/6/2023 2013  Last data filed at 6/6/2023 1800  Gross per 24 hour   Intake 840 ml   Output --   Net 840 ml           Physical Exam  Vitals reviewed.   Constitutional:       General: He is awake. He is not in acute distress.     Appearance: He is well-developed. He is not toxic-appearing.   HENT:      Head: Normocephalic.      Nose: Nose normal.      Mouth/Throat:      Pharynx: Oropharynx is clear.   Eyes:      Extraocular Movements: Extraocular movements intact.      Pupils: Pupils are equal, round, and reactive to light.   Neck:      Thyroid: No thyroid mass.      Vascular: No carotid bruit.   Cardiovascular:      Rate and Rhythm: Normal rate  and regular rhythm.      Pulses: Normal pulses.      Heart sounds: Normal heart sounds. No murmur heard.  Pulmonary:      Effort: Pulmonary effort is normal.      Breath sounds: Normal breath sounds and air entry. No wheezing.   Abdominal:      General: Bowel sounds are normal. There is no distension.      Palpations: Abdomen is soft.      Tenderness: There is no abdominal tenderness.   Musculoskeletal:         General: Signs of injury present. Normal range of motion.      Cervical back: Neck supple. No rigidity.      Comments: Wounds to 1st and 2nd digit left foot that are currently dressed   Skin:     General: Skin is warm.      Coloration: Skin is not jaundiced.      Findings: No lesion.   Neurological:      Mental Status: He is alert and oriented to person, place, and time.      Cranial Nerves: No cranial nerve deficit.      Motor: Weakness present.      Gait: Gait abnormal.      Comments:  1 over 5  weakness to bilateral lower extremities   Psychiatric:         Attention and Perception: Attention normal.         Mood and Affect: Mood normal.         Behavior: Behavior normal. Behavior is cooperative.         Thought Content: Thought content normal.         Cognition and Memory: Cognition normal.           Significant Labs: All pertinent labs within the past 24 hours have been reviewed.  BMP:   Recent Labs   Lab 06/05/23  1009   *      K 5.5*      CO2 24   BUN 67*   CREATININE 5.20*   CALCIUM 8.8       CBC: No results for input(s): WBC, HGB, HCT, PLT in the last 48 hours.  CMP:   Recent Labs   Lab 06/05/23  1009      K 5.5*      CO2 24   *   BUN 67*   CREATININE 5.20*   CALCIUM 8.8   ANIONGAP 21*         Significant Imaging: I have reviewed all pertinent imaging results/findings within the past 24 hours.    Microbiology Results (last 7 days)       ** No results found for the last 168 hours. **          Intake/Output - Last 3 Shifts         06/05 0700  06/06 0659 06/06  0700  06/07 0659    P.O. 0 840    Other 500     Total Intake(mL/kg) 500 (6.7) 840 (11.2)    Other 4500     Total Output 4500     Net -4000 +840

## 2023-06-07 NOTE — SUBJECTIVE & OBJECTIVE
Interval History:     Review of Systems   Constitutional:  Negative for appetite change, fatigue and fever.   HENT:  Negative for congestion, hearing loss and trouble swallowing.    Respiratory:  Negative for chest tightness, shortness of breath and wheezing.    Cardiovascular:  Negative for chest pain and palpitations.   Gastrointestinal:  Positive for constipation. Negative for abdominal pain and nausea.   Genitourinary:  Positive for difficulty urinating. Negative for dysuria.   Musculoskeletal:  Positive for gait problem. Negative for back pain and neck stiffness.   Skin:  Positive for wound. Negative for pallor and rash.   Neurological:  Positive for weakness. Negative for dizziness, speech difficulty and headaches.   Psychiatric/Behavioral:  Negative for confusion and suicidal ideas.    Objective:     Vital Signs (Most Recent):  Temp: 98.6 °F (37 °C) (06/07/23 1215)  Pulse: 76 (06/07/23 1215)  Resp: 18 (06/07/23 1215)  BP: (!) 146/68 (06/07/23 1215)  SpO2: 98 % (06/07/23 1200) Vital Signs (24h Range):  Temp:  [98.2 °F (36.8 °C)-98.6 °F (37 °C)] 98.6 °F (37 °C)  Pulse:  [72-78] 76  Resp:  [18] 18  SpO2:  [97 %-98 %] 98 %  BP: (126-192)/(38-75) 146/68     Weight: 72.2 kg (159 lb 2.8 oz)  Body mass index is 28.2 kg/m².    Intake/Output Summary (Last 24 hours) at 6/7/2023 1720  Last data filed at 6/7/2023 1215  Gross per 24 hour   Intake 240 ml   Output 2685 ml   Net -2445 ml           Physical Exam  Vitals reviewed.   Constitutional:       General: He is awake. He is not in acute distress.     Appearance: He is well-developed. He is not toxic-appearing.   HENT:      Head: Normocephalic.      Nose: Nose normal.      Mouth/Throat:      Pharynx: Oropharynx is clear.   Eyes:      Extraocular Movements: Extraocular movements intact.      Pupils: Pupils are equal, round, and reactive to light.   Neck:      Thyroid: No thyroid mass.      Vascular: No carotid bruit.   Cardiovascular:      Rate and Rhythm: Normal rate and  regular rhythm.      Pulses: Normal pulses.      Heart sounds: Normal heart sounds. No murmur heard.  Pulmonary:      Effort: Pulmonary effort is normal.      Breath sounds: Normal breath sounds and air entry. No wheezing.   Abdominal:      General: Bowel sounds are normal. There is no distension.      Palpations: Abdomen is soft.      Tenderness: There is no abdominal tenderness.   Musculoskeletal:         General: Signs of injury present. Normal range of motion.      Cervical back: Neck supple. No rigidity.      Comments: Wounds to 1st and 2nd digit left foot that are currently dressed   Skin:     General: Skin is warm.      Coloration: Skin is not jaundiced.      Findings: No lesion.   Neurological:      Mental Status: He is alert and oriented to person, place, and time.      Cranial Nerves: No cranial nerve deficit.      Motor: Weakness present.      Gait: Gait abnormal.      Comments:  1 over 5  weakness to bilateral lower extremities   Psychiatric:         Attention and Perception: Attention normal.         Mood and Affect: Mood normal.         Behavior: Behavior normal. Behavior is cooperative.         Thought Content: Thought content normal.         Cognition and Memory: Cognition normal.           Significant Labs: All pertinent labs within the past 24 hours have been reviewed.  BMP: No results for input(s): GLU, NA, K, CL, CO2, BUN, CREATININE, CALCIUM, MG in the last 48 hours.    CBC: No results for input(s): WBC, HGB, HCT, PLT in the last 48 hours.  CMP: No results for input(s): NA, K, CL, CO2, GLU, BUN, CREATININE, CALCIUM, PROT, ALBUMIN, BILITOT, ALKPHOS, AST, ALT, ANIONGAP, EGFRNONAA in the last 48 hours.    Invalid input(s): ESTGFAFR[place]      Significant Imaging: I have reviewed all pertinent imaging results/findings within the past 24 hours.    Microbiology Results (last 7 days)       ** No results found for the last 168 hours. **          Intake/Output - Last 3 Shifts         06/05 0700  06/06 0659  06/06 0700  06/07 0659 06/07 0700  06/08 0659    P.O. 0 840     Other 500  0    Total Intake(mL/kg) 500 (6.7) 840 (11.6) 0 (0)    Other 4500  2685    Total Output 4500  2685    Net -4000 +840 -2685           Stool Occurrence  1 x

## 2023-06-07 NOTE — NURSING
Pt informed, per JACKSON Watkins ACNP, that he is able to eat today and will be NPO starting at 0000 tonight for procedure due to him needing to have dialysis prior to procedure. Pt verbalized understanding and had no questions or concerns.

## 2023-06-07 NOTE — NURSING
Anahi FNP Wound Care, has removed toe nails from Left great and 2nd toes. Order for silvedene to start on tomorrow for ischial wound and foot wound.

## 2023-06-07 NOTE — NURSING
AAOX3 in bed skin warm and dry to touch rechecked accucheck 125. Will communicate status to oncoming shift.
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
## 2023-06-07 NOTE — DIALYSIS ROUNDING
The patient was dialyzed today without complication.There was a net fluid removal of 2185 ml during the treatment.
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
## 2023-06-08 ENCOUNTER — ANESTHESIA EVENT (OUTPATIENT)
Dept: SURGERY | Facility: HOSPITAL | Age: 38
End: 2023-06-08
Payer: MEDICARE

## 2023-06-08 ENCOUNTER — ANESTHESIA (OUTPATIENT)
Dept: SURGERY | Facility: HOSPITAL | Age: 38
End: 2023-06-08
Payer: MEDICARE

## 2023-06-08 ENCOUNTER — HOSPITAL ENCOUNTER (OUTPATIENT)
Facility: HOSPITAL | Age: 38
Discharge: NURSING, REHAB OR LONG-TERM CARE | End: 2023-06-08
Attending: SURGERY | Admitting: SURGERY
Payer: MEDICARE

## 2023-06-08 VITALS
HEART RATE: 69 BPM | SYSTOLIC BLOOD PRESSURE: 159 MMHG | TEMPERATURE: 98 F | DIASTOLIC BLOOD PRESSURE: 58 MMHG | OXYGEN SATURATION: 100 % | RESPIRATION RATE: 16 BRPM

## 2023-06-08 DIAGNOSIS — I73.9 PVD (PERIPHERAL VASCULAR DISEASE): Primary | ICD-10-CM

## 2023-06-08 LAB
GLUCOSE SERPL-MCNC: 213 MG/DL (ref 70–105)
GLUCOSE SERPL-MCNC: 220 MG/DL (ref 70–105)
GLUCOSE SERPL-MCNC: 226 MG/DL (ref 70–105)
HCT VFR BLD AUTO: 32.1 % (ref 40–54)
HGB BLD-MCNC: 9.8 G/DL (ref 13.5–18)
INDIRECT COOMBS: NORMAL
POC ACTIVATED CLOTTING TIME K: 254 SEC
RH BLD: NORMAL
SPECIMEN OUTDATE: NORMAL

## 2023-06-08 PROCEDURE — 99238 HOSP IP/OBS DSCHRG MGMT 30/<: CPT | Mod: ,,, | Performed by: NURSE PRACTITIONER

## 2023-06-08 PROCEDURE — 99232 PR SUBSEQUENT HOSPITAL CARE,LEVL II: ICD-10-PCS | Mod: ,,, | Performed by: HOSPITALIST

## 2023-06-08 PROCEDURE — 25000003 PHARM REV CODE 250: Performed by: NURSE PRACTITIONER

## 2023-06-08 PROCEDURE — C1769 GUIDE WIRE: HCPCS

## 2023-06-08 PROCEDURE — D9220A PRA ANESTHESIA: ICD-10-PCS | Mod: CRNA,,, | Performed by: NURSE ANESTHETIST, CERTIFIED REGISTERED

## 2023-06-08 PROCEDURE — 25000003 PHARM REV CODE 250: Performed by: NURSE ANESTHETIST, CERTIFIED REGISTERED

## 2023-06-08 PROCEDURE — 27000177 HC AIRWAY, LARYNGEAL MASK

## 2023-06-08 PROCEDURE — 63600175 PHARM REV CODE 636 W HCPCS: Performed by: HOSPITALIST

## 2023-06-08 PROCEDURE — 80100014 HC HEMODIALYSIS 1:1

## 2023-06-08 PROCEDURE — 25000003 PHARM REV CODE 250: Performed by: FAMILY MEDICINE

## 2023-06-08 PROCEDURE — 27201423 OPTIME MED/SURG SUP & DEVICES STERILE SUPPLY

## 2023-06-08 PROCEDURE — 25000003 PHARM REV CODE 250

## 2023-06-08 PROCEDURE — 63600175 PHARM REV CODE 636 W HCPCS: Performed by: SURGERY

## 2023-06-08 PROCEDURE — 99232 SBSQ HOSP IP/OBS MODERATE 35: CPT | Mod: ,,, | Performed by: HOSPITALIST

## 2023-06-08 PROCEDURE — 71000033 HC RECOVERY, INTIAL HOUR: Performed by: SURGERY

## 2023-06-08 PROCEDURE — 36000707

## 2023-06-08 PROCEDURE — 37000009 HC ANESTHESIA EA ADD 15 MINS: Performed by: SURGERY

## 2023-06-08 PROCEDURE — 37000009 HC ANESTHESIA EA ADD 15 MINS

## 2023-06-08 PROCEDURE — 75710 ARTERY X-RAYS ARM/LEG: CPT | Mod: 26,LT,, | Performed by: SURGERY

## 2023-06-08 PROCEDURE — 11000001 HC ACUTE MED/SURG PRIVATE ROOM

## 2023-06-08 PROCEDURE — 25500020 PHARM REV CODE 255: Performed by: SURGERY

## 2023-06-08 PROCEDURE — 37000008 HC ANESTHESIA 1ST 15 MINUTES

## 2023-06-08 PROCEDURE — 36000707: Performed by: SURGERY

## 2023-06-08 PROCEDURE — 25500020 PHARM REV CODE 255

## 2023-06-08 PROCEDURE — 82962 GLUCOSE BLOOD TEST: CPT

## 2023-06-08 PROCEDURE — 63600175 PHARM REV CODE 636 W HCPCS

## 2023-06-08 PROCEDURE — 85347 COAGULATION TIME ACTIVATED: CPT

## 2023-06-08 PROCEDURE — C1894 INTRO/SHEATH, NON-LASER: HCPCS

## 2023-06-08 PROCEDURE — D9220A PRA ANESTHESIA: ICD-10-PCS | Mod: ANES,,, | Performed by: ANESTHESIOLOGY

## 2023-06-08 PROCEDURE — 36247 INS CATH ABD/L-EXT ART 3RD: CPT | Mod: LT,,, | Performed by: SURGERY

## 2023-06-08 PROCEDURE — 63600175 PHARM REV CODE 636 W HCPCS: Performed by: NURSE PRACTITIONER

## 2023-06-08 PROCEDURE — 25000003 PHARM REV CODE 250: Performed by: HOSPITALIST

## 2023-06-08 PROCEDURE — 27000177 HC AIRWAY, LARYNGEAL MASK: Performed by: ANESTHESIOLOGY

## 2023-06-08 PROCEDURE — 75710 PR  ANGIO EXTREMITY UNILAT: ICD-10-PCS | Mod: 26,LT,, | Performed by: SURGERY

## 2023-06-08 PROCEDURE — 37000008 HC ANESTHESIA 1ST 15 MINUTES: Performed by: SURGERY

## 2023-06-08 PROCEDURE — D9220A PRA ANESTHESIA: Mod: CRNA,,, | Performed by: NURSE ANESTHETIST, CERTIFIED REGISTERED

## 2023-06-08 PROCEDURE — 27000655

## 2023-06-08 PROCEDURE — C1769 GUIDE WIRE: HCPCS | Performed by: SURGERY

## 2023-06-08 PROCEDURE — 27201423 OPTIME MED/SURG SUP & DEVICES STERILE SUPPLY: Performed by: SURGERY

## 2023-06-08 PROCEDURE — 36000706

## 2023-06-08 PROCEDURE — 86900 BLOOD TYPING SEROLOGIC ABO: CPT

## 2023-06-08 PROCEDURE — 85014 HEMATOCRIT: CPT | Performed by: NURSE PRACTITIONER

## 2023-06-08 PROCEDURE — 63600175 PHARM REV CODE 636 W HCPCS: Performed by: NURSE ANESTHETIST, CERTIFIED REGISTERED

## 2023-06-08 PROCEDURE — C1894 INTRO/SHEATH, NON-LASER: HCPCS | Performed by: SURGERY

## 2023-06-08 PROCEDURE — 36000706: Performed by: SURGERY

## 2023-06-08 PROCEDURE — D9220A PRA ANESTHESIA: Mod: ANES,,, | Performed by: ANESTHESIOLOGY

## 2023-06-08 PROCEDURE — 36247 PR PLACE CATH SUBSUBSELECT ART,ABD/PEL: ICD-10-PCS | Mod: LT,,, | Performed by: SURGERY

## 2023-06-08 PROCEDURE — 99238 PR HOSPITAL DISCHARGE DAY,<30 MIN: ICD-10-PCS | Mod: ,,, | Performed by: NURSE PRACTITIONER

## 2023-06-08 PROCEDURE — 71000033 HC RECOVERY, INTIAL HOUR

## 2023-06-08 PROCEDURE — 27000655: Performed by: ANESTHESIOLOGY

## 2023-06-08 RX ORDER — DIPHENHYDRAMINE HYDROCHLORIDE 50 MG/ML
25 INJECTION INTRAMUSCULAR; INTRAVENOUS EVERY 6 HOURS PRN
Status: DISCONTINUED | OUTPATIENT
Start: 2023-06-08 | End: 2023-06-08 | Stop reason: HOSPADM

## 2023-06-08 RX ORDER — HYDROMORPHONE HYDROCHLORIDE 2 MG/ML
0.5 INJECTION, SOLUTION INTRAMUSCULAR; INTRAVENOUS; SUBCUTANEOUS EVERY 5 MIN PRN
Status: DISCONTINUED | OUTPATIENT
Start: 2023-06-08 | End: 2023-06-08 | Stop reason: HOSPADM

## 2023-06-08 RX ORDER — MORPHINE SULFATE 10 MG/ML
4 INJECTION INTRAMUSCULAR; INTRAVENOUS; SUBCUTANEOUS EVERY 5 MIN PRN
Status: DISCONTINUED | OUTPATIENT
Start: 2023-06-08 | End: 2023-06-08 | Stop reason: HOSPADM

## 2023-06-08 RX ORDER — PROPOFOL 10 MG/ML
VIAL (ML) INTRAVENOUS
Status: DISCONTINUED | OUTPATIENT
Start: 2023-06-08 | End: 2023-06-08

## 2023-06-08 RX ORDER — LIDOCAINE HYDROCHLORIDE 20 MG/ML
INJECTION, SOLUTION EPIDURAL; INFILTRATION; INTRACAUDAL; PERINEURAL
Status: DISCONTINUED | OUTPATIENT
Start: 2023-06-08 | End: 2023-06-08

## 2023-06-08 RX ORDER — FENTANYL CITRATE 50 UG/ML
INJECTION, SOLUTION INTRAMUSCULAR; INTRAVENOUS
Status: DISCONTINUED | OUTPATIENT
Start: 2023-06-08 | End: 2023-06-08

## 2023-06-08 RX ORDER — HEPARIN SODIUM 1000 [USP'U]/ML
INJECTION, SOLUTION INTRAVENOUS; SUBCUTANEOUS
Status: DISCONTINUED | OUTPATIENT
Start: 2023-06-08 | End: 2023-06-08

## 2023-06-08 RX ORDER — ONDANSETRON 2 MG/ML
INJECTION INTRAMUSCULAR; INTRAVENOUS
Status: DISCONTINUED | OUTPATIENT
Start: 2023-06-08 | End: 2023-06-08

## 2023-06-08 RX ORDER — PROTAMINE SULFATE 10 MG/ML
INJECTION, SOLUTION INTRAVENOUS
Status: DISCONTINUED | OUTPATIENT
Start: 2023-06-08 | End: 2023-06-08

## 2023-06-08 RX ORDER — LABETALOL HYDROCHLORIDE 5 MG/ML
10 INJECTION, SOLUTION INTRAVENOUS EVERY 4 HOURS PRN
Status: DISCONTINUED | OUTPATIENT
Start: 2023-06-08 | End: 2023-07-07 | Stop reason: HOSPADM

## 2023-06-08 RX ORDER — HEPARIN SODIUM 1000 [USP'U]/ML
INJECTION, SOLUTION INTRAVENOUS; SUBCUTANEOUS
Status: DISCONTINUED | OUTPATIENT
Start: 2023-06-08 | End: 2023-06-08 | Stop reason: HOSPADM

## 2023-06-08 RX ORDER — MEPERIDINE HYDROCHLORIDE 25 MG/ML
25 INJECTION INTRAMUSCULAR; INTRAVENOUS; SUBCUTANEOUS EVERY 10 MIN PRN
Status: DISCONTINUED | OUTPATIENT
Start: 2023-06-08 | End: 2023-06-08 | Stop reason: HOSPADM

## 2023-06-08 RX ORDER — HYDRALAZINE HYDROCHLORIDE 20 MG/ML
10 INJECTION INTRAMUSCULAR; INTRAVENOUS EVERY 6 HOURS PRN
Status: DISCONTINUED | OUTPATIENT
Start: 2023-06-08 | End: 2023-06-08

## 2023-06-08 RX ORDER — ONDANSETRON 2 MG/ML
4 INJECTION INTRAMUSCULAR; INTRAVENOUS DAILY PRN
Status: DISCONTINUED | OUTPATIENT
Start: 2023-06-08 | End: 2023-06-08 | Stop reason: HOSPADM

## 2023-06-08 RX ORDER — CEFAZOLIN SODIUM 1 G/3ML
INJECTION, POWDER, FOR SOLUTION INTRAMUSCULAR; INTRAVENOUS
Status: DISCONTINUED | OUTPATIENT
Start: 2023-06-08 | End: 2023-06-08

## 2023-06-08 RX ADMIN — CARVEDILOL 25 MG: 25 TABLET, FILM COATED ORAL at 07:06

## 2023-06-08 RX ADMIN — BUPROPION HYDROCHLORIDE 150 MG: 150 TABLET, FILM COATED, EXTENDED RELEASE ORAL at 10:06

## 2023-06-08 RX ADMIN — LABETALOL HYDROCHLORIDE 10 MG: 5 INJECTION, SOLUTION INTRAVENOUS at 06:06

## 2023-06-08 RX ADMIN — FENTANYL CITRATE 50 MCG: 50 INJECTION INTRAMUSCULAR; INTRAVENOUS at 01:06

## 2023-06-08 RX ADMIN — PROTAMINE SULFATE 15 MG: 10 INJECTION, SOLUTION INTRAVENOUS at 01:06

## 2023-06-08 RX ADMIN — ONDANSETRON 4 MG: 2 INJECTION INTRAMUSCULAR; INTRAVENOUS at 01:06

## 2023-06-08 RX ADMIN — HEPARIN SODIUM 5000 UNITS: 5000 INJECTION INTRAVENOUS; SUBCUTANEOUS at 10:06

## 2023-06-08 RX ADMIN — AMOXICILLIN AND CLAVULANATE POTASSIUM 500 MG: 500; 125 TABLET, FILM COATED ORAL at 10:06

## 2023-06-08 RX ADMIN — INSULIN DETEMIR 12 UNITS: 100 INJECTION, SOLUTION SUBCUTANEOUS at 10:06

## 2023-06-08 RX ADMIN — LIDOCAINE HYDROCHLORIDE 100 MG: 20 INJECTION, SOLUTION INTRAVENOUS at 01:06

## 2023-06-08 RX ADMIN — CLONIDINE HYDROCHLORIDE 0.1 MG: 0.1 TABLET ORAL at 10:06

## 2023-06-08 RX ADMIN — AMLODIPINE BESYLATE 10 MG: 10 TABLET ORAL at 09:06

## 2023-06-08 RX ADMIN — DOCUSATE SODIUM 100 MG: 100 CAPSULE, LIQUID FILLED ORAL at 10:06

## 2023-06-08 RX ADMIN — PROPOFOL 100 MG: 10 INJECTION, EMULSION INTRAVENOUS at 01:06

## 2023-06-08 RX ADMIN — ISOSORBIDE MONONITRATE 60 MG: 60 TABLET, EXTENDED RELEASE ORAL at 09:06

## 2023-06-08 RX ADMIN — SODIUM CHLORIDE: 9 INJECTION, SOLUTION INTRAVENOUS at 01:06

## 2023-06-08 RX ADMIN — CLONIDINE HYDROCHLORIDE 0.1 MG: 0.1 TABLET ORAL at 09:06

## 2023-06-08 RX ADMIN — LOSARTAN POTASSIUM 100 MG: 100 TABLET, FILM COATED ORAL at 10:06

## 2023-06-08 RX ADMIN — HYDRALAZINE HYDROCHLORIDE 25 MG: 25 TABLET ORAL at 12:06

## 2023-06-08 RX ADMIN — SEVELAMER CARBONATE 800 MG: 800 TABLET, FILM COATED ORAL at 03:06

## 2023-06-08 RX ADMIN — CEFAZOLIN 2 G: 1 INJECTION, POWDER, FOR SOLUTION INTRAMUSCULAR; INTRAVENOUS; PARENTERAL at 01:06

## 2023-06-08 RX ADMIN — HEPARIN SODIUM 2000 UNITS: 1000 INJECTION INTRAVENOUS; SUBCUTANEOUS at 01:06
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
## 2023-06-08 NOTE — OP NOTE
[facility]  Surgery Department  Operative Note    SUMMARY     Date of Procedure: 6/8/2023     Procedure: Procedure(s) (LRB):  Angiogram Extremity Unilateral (Left)     Surgeon(s) and Role:     * Nicole Trinidad MD - Primary    Assisting Surgeon: None    Pre-Operative Diagnosis: PVD (peripheral vascular disease) [I73.9]    Post-Operative Diagnosis: Post-Op Diagnosis Codes:     * PVD (peripheral vascular disease) [I73.9]    Anesthesia: General/MAC    Operative Findings (including complications, if any):  Patient has least 1 vessel and continuity into the foot two-vessel to the ankle level.    Description of Technical Procedures:  Taken operative suite groins prepped draped appropriate anesthesia assured preoperative antibiotics were given ultrasound guidance access right common femoral artery J-wire placed 5 Algerian sheath.  RBI catheter catheter was advanced to the right external iliac right common iliac the distal abdominal aorta of the left common iliac left external iliac to left common femoral on the left SFA.  Angiogram performed multiple levels last 1 being at the distal SFA on the left large vessels without significant disease good runoff into the foot no indication for any endovascular intervention.  Patient had been heparinized heparin was reversed sheath removed and pressure held per protocol    Significant Surgical Tasks Conducted by the Assistant(s), if Applicable:  Held pressure per protocol    Estimated Blood Loss (EBL): * No values recorded between 6/8/2023  1:33 PM and 6/8/2023  1:55 PM *2cc           Implants: * No implants in log *    Specimens:   Specimen (24h ago, onward)      None                    Condition: Good    Disposition: PACU - hemodynamically stable.    Attestation: I was present and scrubbed for the entire procedure.

## 2023-06-08 NOTE — NURSING
Called to room due to patient complaining of pain,crying out loud and complaining of tightness in RLQ. Continuous pressure held to right groin x15 minutes. Pulse doppler performed and was positive for pedal pulse. Notified April nursing supervisor. Monitoring of patient continued. Patient began saying that he was feeling better.

## 2023-06-08 NOTE — OR NURSING
1415 Patient received to PACU in stable condition.  Slepping --wakes to voice.  Connected to BP cuff, EKG leads and SPO2 monitor.  VS stable. Patient had R angiogram today.  No distress noted. --per Dr. Ramires no insulin to be given.    1445  Patient discharged and moved to Specialty room 131 in stable condition.  /67 HR 69 SpO2 99% RA 98.2  BS report given to AUGUSTINE Christiansen RN @ 5432.

## 2023-06-08 NOTE — SUBJECTIVE & OBJECTIVE
Interval History:     Review of Systems   Constitutional:  Negative for appetite change, fatigue and fever.   HENT:  Negative for congestion, hearing loss and trouble swallowing.    Respiratory:  Negative for chest tightness, shortness of breath and wheezing.    Cardiovascular:  Negative for chest pain and palpitations.   Gastrointestinal:  Positive for constipation. Negative for abdominal pain and nausea.   Genitourinary:  Positive for difficulty urinating. Negative for dysuria.   Musculoskeletal:  Positive for gait problem. Negative for back pain and neck stiffness.   Skin:  Positive for wound. Negative for pallor and rash.   Neurological:  Positive for weakness. Negative for dizziness, speech difficulty and headaches.   Psychiatric/Behavioral:  Negative for confusion and suicidal ideas.    Objective:     Vital Signs (Most Recent):  Temp: 98.2 °F (36.8 °C) (06/08/23 0400)  Pulse: 75 (06/08/23 0652)  Resp: 18 (06/08/23 0400)  BP: (!) 201/61 (06/08/23 0754)  SpO2: 99 % (06/08/23 0400) Vital Signs (24h Range):  Temp:  [97.8 °F (36.6 °C)-98.2 °F (36.8 °C)] 98.2 °F (36.8 °C)  Pulse:  [66-80] 69  Resp:  [16-18] 16  SpO2:  [96 %-100 %] 100 %  BP: ()/(58-98) 159/58     Weight: 72.2 kg (159 lb 2.8 oz)  Body mass index is 28.2 kg/m².    Intake/Output Summary (Last 24 hours) at 6/8/2023 1604  Last data filed at 6/8/2023 0010  Gross per 24 hour   Intake --   Output 2 ml   Net -2 ml           Physical Exam  Vitals reviewed.   Constitutional:       General: He is awake. He is not in acute distress.     Appearance: He is well-developed. He is not toxic-appearing.   HENT:      Head: Normocephalic.      Nose: Nose normal.      Mouth/Throat:      Pharynx: Oropharynx is clear.   Eyes:      Extraocular Movements: Extraocular movements intact.      Pupils: Pupils are equal, round, and reactive to light.   Neck:      Thyroid: No thyroid mass.      Vascular: No carotid bruit.   Cardiovascular:      Rate and Rhythm: Normal rate and  regular rhythm.      Pulses: Normal pulses.      Heart sounds: Normal heart sounds. No murmur heard.  Pulmonary:      Effort: Pulmonary effort is normal.      Breath sounds: Normal breath sounds and air entry. No wheezing.   Abdominal:      General: Bowel sounds are normal. There is no distension.      Palpations: Abdomen is soft.      Tenderness: There is no abdominal tenderness.   Musculoskeletal:         General: Signs of injury present. Normal range of motion.      Cervical back: Neck supple. No rigidity.      Comments: Wounds to 1st and 2nd digit left foot that are currently dressed   Skin:     General: Skin is warm.      Coloration: Skin is not jaundiced.      Findings: No lesion.   Neurological:      Mental Status: He is alert and oriented to person, place, and time.      Cranial Nerves: No cranial nerve deficit.      Motor: Weakness present.      Gait: Gait abnormal.      Comments:  1 over 5  weakness to bilateral lower extremities   Psychiatric:         Attention and Perception: Attention normal.         Mood and Affect: Mood normal.         Behavior: Behavior normal. Behavior is cooperative.         Thought Content: Thought content normal.         Cognition and Memory: Cognition normal.           Significant Labs: All pertinent labs within the past 24 hours have been reviewed.  BMP: No results for input(s): GLU, NA, K, CL, CO2, BUN, CREATININE, CALCIUM, MG in the last 48 hours.    CBC: No results for input(s): WBC, HGB, HCT, PLT in the last 48 hours.  CMP: No results for input(s): NA, K, CL, CO2, GLU, BUN, CREATININE, CALCIUM, PROT, ALBUMIN, BILITOT, ALKPHOS, AST, ALT, ANIONGAP, EGFRNONAA in the last 48 hours.    Invalid input(s): ESTGFAFRICA      Significant Imaging: I have reviewed all pertinent imaging results/findings within the past 24 hours.    Microbiology Results (last 7 days)       ** No results found for the last 168 hours. **          Intake/Output - Last 3 Shifts         06/06 0700  06/07 0659  06/07 0700  06/08 0659 06/08 0700  06/09 0659    P.O. 840      Other  0     Total Intake(mL/kg) 840 (11.6) 0 (0)     Other  2685     Stool  2     Total Output  2687     Net +840 -2687            Stool Occurrence 1 x 2 x

## 2023-06-08 NOTE — NURSING
Patient back on unit from surgery. Dressing noted to right groin. Positive pedal pulse to right foot. Left foot bandaged. No complaints or concerns voiced at this time.

## 2023-06-08 NOTE — ASSESSMENT & PLAN NOTE
Wound culture with Proteus, Enterococcus. Continue antibiotics and wound care.    Culture, Wound/Abscess Light Growth Proteus mirabilis Abnormal        Moderate Enterococcus faecalis Abnormal             Resulting Agency:      Susceptibility     Proteus mirabilis Enterococcus faecalis     Not Specified Not Specified     Amikacin <=16 µg/ml Sensitive       Ampicillin <=8 µg/ml Sensitive <=2 µg/ml Sensitive     Ampicillin/Sulbactam <=8/4 µg/ml Sensitive       Aztreonam <=4 µg/ml Sensitive       Cefazolin <=8 µg/ml Sensitive       Cefepime <=4 µg/ml Sensitive       Cefotaxime <=2 µg/ml Sensitive       Cefoxitin <=8 µg/ml Sensitive       Ceftazidime <=1 µg/ml Sensitive       Ceftriaxone <=1 µg/ml Sensitive       Cefuroxime <=4 µg/ml Sensitive       Ertapenem <=1 µg/ml Sensitive       Gentamicin <=4 µg/ml Sensitive       Gentamycin Synergy Screen   >500 µg/ml Resistant     Meropenem <=1 µg/ml Sensitive       Penicillin   2 µg/ml Sensitive     Piperacillin/Tazobactam <=16 µg/ml Sensitive       Tobramycin <=4 µg/ml Sensitive       Trimeth/Sulfa <=2/38 µg/ml Sensitive                      Specimen Collected: 05/25/23 19:21 Last Resulted: 05/28/23 08:12

## 2023-06-08 NOTE — PLAN OF CARE
Problem: Adult Inpatient Plan of Care  Goal: Absence of Hospital-Acquired Illness or Injury  Outcome: Ongoing, Progressing     Problem: Diabetes Comorbidity  Goal: Blood Glucose Level Within Targeted Range  Outcome: Ongoing, Progressing     Problem: Fall Injury Risk  Goal: Absence of Fall and Fall-Related Injury  Outcome: Ongoing, Progressing     Problem: Skin Injury Risk Increased  Goal: Skin Health and Integrity  Outcome: Ongoing, Progressing

## 2023-06-08 NOTE — ASSESSMENT & PLAN NOTE
[id number]

## 2023-06-08 NOTE — DIALYSIS ROUNDING
The patient will be dialyzed today for 2 hours after undergoing the angiogram earlier today.His treatment was complicated by hypotension    PE  Lungs-clear  Cor-no rub  Abd-soft    Plan:   Hemodialysis tomorrow

## 2023-06-08 NOTE — NURSING
Notified KAITLIN Watkins that patient had episode of back pain, feeling dizzy, and RLQ pain VS 70, Resp 14, /48, O2 is 98% on room air. Order received for stat h&h, continue to monitor for hematoma, and notify of changes.

## 2023-06-08 NOTE — TRANSFER OF CARE
Anesthesia Transfer of Care Note    Patient: Lee Escobar    Procedure(s) Performed: Procedure(s) (LRB):  Angiogram Extremity Unilateral (Left)    Patient location: PACU    Anesthesia Type: general    Transport from OR: Transported from OR on 6-10 L/min O2 by face mask with adequate spontaneous ventilation    Post pain: adequate analgesia    Post assessment: no apparent anesthetic complications    Post vital signs: stable    Level of consciousness: awake and alert    Nausea/Vomiting: no nausea/vomiting    Complications: none    Transfer of care protocol was followed      Last vitals:   Visit Vitals  BP 92/66   Pulse 66   Temp 36.8 °C (98.2 °F)   Resp 16   SpO2 100%

## 2023-06-08 NOTE — ASSESSMENT & PLAN NOTE
[id number]

## 2023-06-08 NOTE — NURSING
Notified KAITLIN Watkins NP that patient c/o SOB and bp 96/33. Was told ok to dialyze patient if stable.

## 2023-06-08 NOTE — DISCHARGE SUMMARY
[facility]  Discharge Note  Short Stay    Procedure(s) (LRB):  Angiogram Extremity Unilateral (Left)      OUTCOME: Patient tolerated treatment/procedure well without complication and is now ready for discharge.  Diagnositic LEFT peripheral angiogram,     DISPOSITION: Long Term Care [facility]     FINAL DIAGNOSIS:  <principal problem not specified>PVD    FOLLOWUP: In clinic    DISCHARGE INSTRUCTIONS:  No discharge procedures on file.     TIME SPENT ON DISCHARGE:  minutes

## 2023-06-08 NOTE — ANESTHESIA POSTPROCEDURE EVALUATION
Anesthesia Post Evaluation    Patient: Lee Escobar    Procedure(s) Performed: Procedure(s) (LRB):  Angiogram Extremity Unilateral (Left)    Final Anesthesia Type: general      Patient location during evaluation: PACU  Post-procedure vital signs: reviewed and stable  Pain management: adequate  Airway patency: patent    PONV status at discharge: No PONV  Anesthetic complications: no      Cardiovascular status: hemodynamically stable  Respiratory status: unassisted  Hydration status: euvolemic  Follow-up not needed.          Vitals Value Taken Time   /58 06/08/23 1445   Temp 36.8 °C (98.2 °F) 06/08/23 1418   Pulse 69 06/08/23 1445   Resp 16 06/08/23 1445   SpO2 100 % 06/08/23 1445         Event Time   Out of Recovery 14:45:00         Pain/Niki Score: Niki Score: 9 (6/8/2023  2:45 PM)

## 2023-06-08 NOTE — PLAN OF CARE
Chart reviewed. Per MD notes, patient having vascular procedure by Dr. Robison today. Dialysis yesterday. Continue plan of care. SS following for discharge needs as arise.

## 2023-06-08 NOTE — ANESTHESIA PROCEDURE NOTES
Intubation    Date/Time: 6/8/2023 1:13 PM  Performed by: Michaela Jovel CRNA  Authorized by: Aditya Castillo MD     Intubation:     Intubated:  Postinduction    Mask Ventilation:  Not attempted    Attempts:  1    Attempted By:  CRNA    Method of Intubation:  Other (see comments)    Difficult Airway Encountered?: No      Complications:  None    Airway Device:  Supraglottic airway/LMA    Airway Device Size:  4.0    Style/Cuff Inflation:  Cuffed (inflated to minimal occlusive pressure)    Placement Verified By:  Capnometry    Complicating Factors:  None    Findings Post-Intubation:  BS equal bilateral and atraumatic/condition of teeth unchanged
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
## 2023-06-08 NOTE — NURSING
Resting quietly In bed. Gave Labetalol 10mg IVP over 5/min for /98.NPO for am procedure Will continue to monitor.

## 2023-06-09 LAB
GLUCOSE SERPL-MCNC: 108 MG/DL (ref 70–105)
GLUCOSE SERPL-MCNC: 143 MG/DL (ref 70–105)
GLUCOSE SERPL-MCNC: 150 MG/DL (ref 70–105)
GLUCOSE SERPL-MCNC: 302 MG/DL (ref 70–105)

## 2023-06-09 PROCEDURE — 99232 SBSQ HOSP IP/OBS MODERATE 35: CPT | Mod: ,,, | Performed by: INTERNAL MEDICINE

## 2023-06-09 PROCEDURE — 99232 PR SUBSEQUENT HOSPITAL CARE,LEVL II: ICD-10-PCS | Mod: ,,, | Performed by: INTERNAL MEDICINE

## 2023-06-09 PROCEDURE — 25000003 PHARM REV CODE 250: Performed by: FAMILY MEDICINE

## 2023-06-09 PROCEDURE — 90935 HEMODIALYSIS ONE EVALUATION: CPT

## 2023-06-09 PROCEDURE — 63600175 PHARM REV CODE 636 W HCPCS: Performed by: HOSPITALIST

## 2023-06-09 PROCEDURE — 25000003 PHARM REV CODE 250: Performed by: HOSPITALIST

## 2023-06-09 PROCEDURE — 11000001 HC ACUTE MED/SURG PRIVATE ROOM

## 2023-06-09 PROCEDURE — 25000003 PHARM REV CODE 250: Performed by: INTERNAL MEDICINE

## 2023-06-09 PROCEDURE — 63600175 PHARM REV CODE 636 W HCPCS: Performed by: FAMILY MEDICINE

## 2023-06-09 PROCEDURE — 63600175 PHARM REV CODE 636 W HCPCS: Performed by: NURSE PRACTITIONER

## 2023-06-09 PROCEDURE — 25000003 PHARM REV CODE 250: Performed by: NURSE PRACTITIONER

## 2023-06-09 PROCEDURE — 82962 GLUCOSE BLOOD TEST: CPT

## 2023-06-09 RX ORDER — SILVER SULFADIAZINE 10 G/1000G
CREAM TOPICAL DAILY PRN
Status: DISCONTINUED | OUTPATIENT
Start: 2023-06-09 | End: 2023-07-05

## 2023-06-09 RX ADMIN — CLONIDINE HYDROCHLORIDE 0.1 MG: 0.1 TABLET ORAL at 09:06

## 2023-06-09 RX ADMIN — PANTOPRAZOLE SODIUM 40 MG: 40 TABLET, DELAYED RELEASE ORAL at 09:06

## 2023-06-09 RX ADMIN — INSULIN ASPART 4 UNITS: 100 INJECTION, SOLUTION INTRAVENOUS; SUBCUTANEOUS at 06:06

## 2023-06-09 RX ADMIN — HEPARIN SODIUM 5000 UNITS: 5000 INJECTION INTRAVENOUS; SUBCUTANEOUS at 11:06

## 2023-06-09 RX ADMIN — DOCUSATE SODIUM 100 MG: 100 CAPSULE, LIQUID FILLED ORAL at 09:06

## 2023-06-09 RX ADMIN — LACTULOSE 20 G: 20 SOLUTION ORAL at 03:06

## 2023-06-09 RX ADMIN — HEPARIN SODIUM 5000 UNITS: 5000 INJECTION INTRAVENOUS; SUBCUTANEOUS at 05:06

## 2023-06-09 RX ADMIN — BUPROPION HYDROCHLORIDE 150 MG: 150 TABLET, FILM COATED, EXTENDED RELEASE ORAL at 09:06

## 2023-06-09 RX ADMIN — LACTULOSE 20 G: 20 SOLUTION ORAL at 09:06

## 2023-06-09 RX ADMIN — AMLODIPINE BESYLATE 10 MG: 10 TABLET ORAL at 09:06

## 2023-06-09 RX ADMIN — CLONIDINE HYDROCHLORIDE 0.1 MG: 0.1 TABLET ORAL at 03:06

## 2023-06-09 RX ADMIN — AMOXICILLIN AND CLAVULANATE POTASSIUM 500 MG: 500; 125 TABLET, FILM COATED ORAL at 09:06

## 2023-06-09 RX ADMIN — INSULIN ASPART 4 UNITS: 100 INJECTION, SOLUTION INTRAVENOUS; SUBCUTANEOUS at 05:06

## 2023-06-09 RX ADMIN — SEVELAMER CARBONATE 800 MG: 800 TABLET, FILM COATED ORAL at 05:06

## 2023-06-09 RX ADMIN — CARVEDILOL 25 MG: 25 TABLET, FILM COATED ORAL at 05:06

## 2023-06-09 RX ADMIN — INSULIN DETEMIR 12 UNITS: 100 INJECTION, SOLUTION SUBCUTANEOUS at 09:06

## 2023-06-09 RX ADMIN — ISOSORBIDE MONONITRATE 60 MG: 60 TABLET, EXTENDED RELEASE ORAL at 09:06

## 2023-06-09 RX ADMIN — CARVEDILOL 25 MG: 25 TABLET, FILM COATED ORAL at 09:06

## 2023-06-09 RX ADMIN — INSULIN ASPART 4 UNITS: 100 INJECTION, SOLUTION INTRAVENOUS; SUBCUTANEOUS at 12:06

## 2023-06-09 RX ADMIN — SEVELAMER CARBONATE 800 MG: 800 TABLET, FILM COATED ORAL at 12:06

## 2023-06-09 RX ADMIN — HEPARIN SODIUM 5000 UNITS: 5000 INJECTION INTRAVENOUS; SUBCUTANEOUS at 03:06

## 2023-06-09 RX ADMIN — FERROUS SULFATE TAB 325 MG (65 MG ELEMENTAL FE) 1 EACH: 325 (65 FE) TAB at 09:06

## 2023-06-09 RX ADMIN — LOSARTAN POTASSIUM 100 MG: 100 TABLET, FILM COATED ORAL at 09:06

## 2023-06-09 RX ADMIN — SEVELAMER CARBONATE 800 MG: 800 TABLET, FILM COATED ORAL at 06:06

## 2023-06-09 RX ADMIN — SODIUM CHLORIDE: 9 INJECTION, SOLUTION INTRAVENOUS at 10:06

## 2023-06-09 NOTE — PLAN OF CARE
Problem: Adult Inpatient Plan of Care  Goal: Plan of Care Review  Outcome: Ongoing, Progressing     Problem: Adult Inpatient Plan of Care  Goal: Patient-Specific Goal (Individualized)  Outcome: Ongoing, Progressing     Problem: Adult Inpatient Plan of Care  Goal: Absence of Hospital-Acquired Illness or Injury  Outcome: Ongoing, Progressing     Problem: Adult Inpatient Plan of Care  Goal: Optimal Comfort and Wellbeing  Outcome: Ongoing, Progressing     Problem: Fall Injury Risk  Goal: Absence of Fall and Fall-Related Injury  Outcome: Ongoing, Progressing

## 2023-06-09 NOTE — SUBJECTIVE & OBJECTIVE
Interval History:     ENAEO  Pt states he is at [facility]  No labs, ordered for am    Review of Systems   Constitutional:  Negative for appetite change, fatigue and fever.   HENT:  Negative for congestion, hearing loss and trouble swallowing.    Respiratory:  Negative for chest tightness, shortness of breath and wheezing.    Cardiovascular:  Negative for chest pain and palpitations.   Gastrointestinal:  Positive for constipation. Negative for abdominal pain and nausea.   Genitourinary:  Positive for difficulty urinating. Negative for dysuria.   Musculoskeletal:  Positive for gait problem. Negative for back pain and neck stiffness.   Skin:  Positive for wound. Negative for pallor and rash.   Neurological:  Positive for weakness. Negative for dizziness, speech difficulty and headaches.   Psychiatric/Behavioral:  Negative for confusion and suicidal ideas.    Objective:     Vital Signs (Most Recent):  Temp: 98.3 °F (36.8 °C) (06/09/23 0958)  Pulse: 76 (06/09/23 1130)  Resp: 18 (06/09/23 1130)  BP: (!) 145/31 (06/09/23 1130)  SpO2: 100 % (06/09/23 0800) Vital Signs (24h Range):  Temp:  [97.4 °F (36.3 °C)-98.6 °F (37 °C)] 98.3 °F (36.8 °C)  Pulse:  [3-82] 76  Resp:  [16-22] 18  SpO2:  [95 %-100 %] 100 %  BP: ()/(29-96) 145/31     Weight: 72.2 kg (159 lb 2.8 oz)  Body mass index is 28.2 kg/m².    Intake/Output Summary (Last 24 hours) at 6/9/2023 1146  Last data filed at 6/9/2023 0601  Gross per 24 hour   Intake 600 ml   Output 0 ml   Net 600 ml         Physical Exam  Vitals reviewed.   Constitutional:       General: He is awake. He is not in acute distress.     Appearance: He is well-developed. He is not toxic-appearing.   HENT:      Head: Normocephalic.      Nose: Nose normal.      Mouth/Throat:      Pharynx: Oropharynx is clear.   Eyes:      Extraocular Movements: Extraocular movements intact.      Pupils: Pupils are equal, round, and reactive to light.   Neck:      Thyroid: No thyroid mass.      Vascular: No carotid  bruit.   Cardiovascular:      Rate and Rhythm: Normal rate and regular rhythm.      Pulses: Normal pulses.      Heart sounds: Normal heart sounds. No murmur heard.  Pulmonary:      Effort: Pulmonary effort is normal.      Breath sounds: Normal breath sounds and air entry. No wheezing.   Abdominal:      General: Bowel sounds are normal. There is no distension.      Palpations: Abdomen is soft.      Tenderness: There is no abdominal tenderness.   Musculoskeletal:         General: Signs of injury present. Normal range of motion.      Cervical back: Neck supple. No rigidity.      Comments: Wounds to 1st and 2nd digit left foot that are currently dressed   Skin:     General: Skin is warm.      Coloration: Skin is not jaundiced.      Findings: No lesion.   Neurological:      Mental Status: He is alert and oriented to person, place, and time.      Cranial Nerves: No cranial nerve deficit.      Motor: Weakness present.      Gait: Gait abnormal.      Comments:  1 over 5  weakness to bilateral lower extremities   Psychiatric:         Attention and Perception: Attention normal.         Mood and Affect: Mood normal.         Behavior: Behavior normal. Behavior is cooperative.         Thought Content: Thought content normal.         Cognition and Memory: Cognition normal.           Significant Labs: All pertinent labs within the past 24 hours have been reviewed.    Significant Imaging: I have reviewed all pertinent imaging results/findings within the past 24 hours.
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
## 2023-06-09 NOTE — PROGRESS NOTES
Wound care note;  Nursing to start silvadene cream to Left great toe, 2nd toe , Left heel and Right ischial in am 6/10/23.   Applied santly ointment to all areas today.

## 2023-06-10 LAB
ALBUMIN SERPL BCP-MCNC: 2.6 G/DL (ref 3.5–5)
ALBUMIN/GLOB SERPL: 0.7 {RATIO}
ALP SERPL-CCNC: 104 U/L (ref 45–115)
ALT SERPL W P-5'-P-CCNC: 21 U/L (ref 16–61)
ANION GAP SERPL CALCULATED.3IONS-SCNC: 17 MMOL/L (ref 7–16)
AST SERPL W P-5'-P-CCNC: 24 U/L (ref 15–37)
BASOPHILS # BLD AUTO: 0.07 K/UL (ref 0–0.2)
BASOPHILS NFR BLD AUTO: 0.9 % (ref 0–1)
BILIRUB SERPL-MCNC: 0.3 MG/DL (ref ?–1.2)
BUN SERPL-MCNC: 43 MG/DL (ref 7–18)
BUN/CREAT SERPL: 10 (ref 6–20)
CALCIUM SERPL-MCNC: 8.6 MG/DL (ref 8.5–10.1)
CHLORIDE SERPL-SCNC: 102 MMOL/L (ref 98–107)
CO2 SERPL-SCNC: 27 MMOL/L (ref 21–32)
CREAT SERPL-MCNC: 4.41 MG/DL (ref 0.7–1.3)
DIFFERENTIAL METHOD BLD: ABNORMAL
EGFR (NO RACE VARIABLE) (RUSH/TITUS): 17 ML/MIN/1.73M2
EOSINOPHIL # BLD AUTO: 0.96 K/UL (ref 0–0.5)
EOSINOPHIL NFR BLD AUTO: 12.5 % (ref 1–4)
ERYTHROCYTE [DISTWIDTH] IN BLOOD BY AUTOMATED COUNT: 15.9 % (ref 11.5–14.5)
GLOBULIN SER-MCNC: 3.9 G/DL (ref 2–4)
GLUCOSE SERPL-MCNC: 107 MG/DL (ref 70–105)
GLUCOSE SERPL-MCNC: 229 MG/DL (ref 70–105)
GLUCOSE SERPL-MCNC: 45 MG/DL (ref 74–106)
GLUCOSE SERPL-MCNC: 53 MG/DL (ref 70–105)
GLUCOSE SERPL-MCNC: 91 MG/DL (ref 70–105)
GLUCOSE SERPL-MCNC: 94 MG/DL (ref 70–105)
HCT VFR BLD AUTO: 28.4 % (ref 40–54)
HGB BLD-MCNC: 8.7 G/DL (ref 13.5–18)
IMM GRANULOCYTES # BLD AUTO: 0.02 K/UL (ref 0–0.04)
IMM GRANULOCYTES NFR BLD: 0.3 % (ref 0–0.4)
LYMPHOCYTES # BLD AUTO: 1.12 K/UL (ref 1–4.8)
LYMPHOCYTES NFR BLD AUTO: 14.6 % (ref 27–41)
MCH RBC QN AUTO: 27.7 PG (ref 27–31)
MCHC RBC AUTO-ENTMCNC: 30.6 G/DL (ref 32–36)
MCV RBC AUTO: 90.4 FL (ref 80–96)
MONOCYTES # BLD AUTO: 0.72 K/UL (ref 0–0.8)
MONOCYTES NFR BLD AUTO: 9.4 % (ref 2–6)
MPC BLD CALC-MCNC: 11.3 FL (ref 9.4–12.4)
NEUTROPHILS # BLD AUTO: 4.79 K/UL (ref 1.8–7.7)
NEUTROPHILS NFR BLD AUTO: 62.3 % (ref 53–65)
NRBC # BLD AUTO: 0 X10E3/UL
NRBC, AUTO (.00): 0 %
PLATELET # BLD AUTO: 167 K/UL (ref 150–400)
POTASSIUM SERPL-SCNC: 4.6 MMOL/L (ref 3.5–5.1)
PROT SERPL-MCNC: 6.5 G/DL (ref 6.4–8.2)
RBC # BLD AUTO: 3.14 M/UL (ref 4.6–6.2)
SODIUM SERPL-SCNC: 141 MMOL/L (ref 136–145)
WBC # BLD AUTO: 7.68 K/UL (ref 4.5–11)

## 2023-06-10 PROCEDURE — 85025 COMPLETE CBC W/AUTO DIFF WBC: CPT | Performed by: INTERNAL MEDICINE

## 2023-06-10 PROCEDURE — 82962 GLUCOSE BLOOD TEST: CPT

## 2023-06-10 PROCEDURE — 63600175 PHARM REV CODE 636 W HCPCS: Performed by: NURSE PRACTITIONER

## 2023-06-10 PROCEDURE — 63600175 PHARM REV CODE 636 W HCPCS: Performed by: FAMILY MEDICINE

## 2023-06-10 PROCEDURE — 80053 COMPREHEN METABOLIC PANEL: CPT | Performed by: INTERNAL MEDICINE

## 2023-06-10 PROCEDURE — 63600175 PHARM REV CODE 636 W HCPCS: Performed by: HOSPITALIST

## 2023-06-10 PROCEDURE — 25000003 PHARM REV CODE 250: Performed by: FAMILY MEDICINE

## 2023-06-10 PROCEDURE — A6212 FOAM DRG <=16 SQ IN W/BORDER: HCPCS

## 2023-06-10 PROCEDURE — 25000003 PHARM REV CODE 250: Performed by: HOSPITALIST

## 2023-06-10 PROCEDURE — 99233 PR SUBSEQUENT HOSPITAL CARE,LEVL III: ICD-10-PCS | Mod: ,,, | Performed by: HOSPITALIST

## 2023-06-10 PROCEDURE — 25000003 PHARM REV CODE 250: Performed by: NURSE PRACTITIONER

## 2023-06-10 PROCEDURE — 99233 SBSQ HOSP IP/OBS HIGH 50: CPT | Mod: ,,, | Performed by: HOSPITALIST

## 2023-06-10 PROCEDURE — 11000001 HC ACUTE MED/SURG PRIVATE ROOM

## 2023-06-10 RX ADMIN — LOSARTAN POTASSIUM 100 MG: 100 TABLET, FILM COATED ORAL at 09:06

## 2023-06-10 RX ADMIN — CLONIDINE HYDROCHLORIDE 0.1 MG: 0.1 TABLET ORAL at 09:06

## 2023-06-10 RX ADMIN — SILVER SULFADIAZINE: 10 CREAM TOPICAL at 05:06

## 2023-06-10 RX ADMIN — BUPROPION HYDROCHLORIDE 150 MG: 150 TABLET, FILM COATED, EXTENDED RELEASE ORAL at 09:06

## 2023-06-10 RX ADMIN — INSULIN ASPART 4 UNITS: 100 INJECTION, SOLUTION INTRAVENOUS; SUBCUTANEOUS at 11:06

## 2023-06-10 RX ADMIN — DOCUSATE SODIUM 100 MG: 100 CAPSULE, LIQUID FILLED ORAL at 09:06

## 2023-06-10 RX ADMIN — LACTULOSE 20 G: 20 SOLUTION ORAL at 08:06

## 2023-06-10 RX ADMIN — HEPARIN SODIUM 5000 UNITS: 5000 INJECTION INTRAVENOUS; SUBCUTANEOUS at 06:06

## 2023-06-10 RX ADMIN — INSULIN ASPART 2 UNITS: 100 INJECTION, SOLUTION INTRAVENOUS; SUBCUTANEOUS at 11:06

## 2023-06-10 RX ADMIN — HEPARIN SODIUM 5000 UNITS: 5000 INJECTION INTRAVENOUS; SUBCUTANEOUS at 09:06

## 2023-06-10 RX ADMIN — ISOSORBIDE MONONITRATE 60 MG: 60 TABLET, EXTENDED RELEASE ORAL at 08:06

## 2023-06-10 RX ADMIN — AMLODIPINE BESYLATE 10 MG: 10 TABLET ORAL at 08:06

## 2023-06-10 RX ADMIN — CARVEDILOL 25 MG: 25 TABLET, FILM COATED ORAL at 05:06

## 2023-06-10 RX ADMIN — SEVELAMER CARBONATE 800 MG: 800 TABLET, FILM COATED ORAL at 11:06

## 2023-06-10 RX ADMIN — DOCUSATE SODIUM 100 MG: 100 CAPSULE, LIQUID FILLED ORAL at 08:06

## 2023-06-10 RX ADMIN — FERROUS SULFATE TAB 325 MG (65 MG ELEMENTAL FE) 1 EACH: 325 (65 FE) TAB at 08:06

## 2023-06-10 RX ADMIN — SEVELAMER CARBONATE 800 MG: 800 TABLET, FILM COATED ORAL at 05:06

## 2023-06-10 RX ADMIN — HEPARIN SODIUM 5000 UNITS: 5000 INJECTION INTRAVENOUS; SUBCUTANEOUS at 02:06

## 2023-06-10 RX ADMIN — CLONIDINE HYDROCHLORIDE 0.1 MG: 0.1 TABLET ORAL at 02:06

## 2023-06-10 RX ADMIN — AMPICILLIN SODIUM 2 G: 2 INJECTION, POWDER, FOR SOLUTION INTRAMUSCULAR; INTRAVENOUS at 05:06

## 2023-06-10 RX ADMIN — LACTULOSE 20 G: 20 SOLUTION ORAL at 09:06

## 2023-06-10 RX ADMIN — SEVELAMER CARBONATE 800 MG: 800 TABLET, FILM COATED ORAL at 06:06

## 2023-06-10 RX ADMIN — INSULIN ASPART 4 UNITS: 100 INJECTION, SOLUTION INTRAVENOUS; SUBCUTANEOUS at 05:06

## 2023-06-10 RX ADMIN — CARVEDILOL 25 MG: 25 TABLET, FILM COATED ORAL at 08:06

## 2023-06-10 RX ADMIN — PANTOPRAZOLE SODIUM 40 MG: 40 TABLET, DELAYED RELEASE ORAL at 08:06

## 2023-06-10 RX ADMIN — INSULIN DETEMIR 12 UNITS: 100 INJECTION, SOLUTION SUBCUTANEOUS at 09:06

## 2023-06-10 RX ADMIN — CLONIDINE HYDROCHLORIDE 0.1 MG: 0.1 TABLET ORAL at 08:06

## 2023-06-10 RX ADMIN — BUPROPION HYDROCHLORIDE 150 MG: 150 TABLET, FILM COATED, EXTENDED RELEASE ORAL at 08:06

## 2023-06-10 NOTE — PLAN OF CARE
Problem: Adult Inpatient Plan of Care  Goal: Plan of Care Review  Outcome: Ongoing, Progressing     Problem: Adult Inpatient Plan of Care  Goal: Patient-Specific Goal (Individualized)  Outcome: Ongoing, Progressing     Problem: Adult Inpatient Plan of Care  Goal: Absence of Hospital-Acquired Illness or Injury  Outcome: Ongoing, Progressing     Problem: Diabetes Comorbidity  Goal: Blood Glucose Level Within Targeted Range  Outcome: Ongoing, Progressing     Problem: Impaired Wound Healing  Goal: Optimal Wound Healing  Outcome: Ongoing, Progressing     Problem: Device-Related Complication Risk (Hemodialysis)  Goal: Safe, Effective Therapy Delivery  Outcome: Ongoing, Progressing      Event Note/VTE risk assessment

## 2023-06-10 NOTE — PROGRESS NOTES
[facility]  Nephrology  Progress Note    Patient Name: Lee Escobar  MRN: [id number]  Admission Date: 5/26/2023  Hospital Length of Stay: 15 days  Attending Provider: Mimi Arizmendi MD   Primary Care Physician: Maria Elena Solorio II, MD  Principal Problem:Osteomyelitis of left foot    Subjective:     HPI: No notes on file    Interval History: The patient is sitting up in bed.  He voices no complaints today.    Review of patient's allergies indicates:  No Known Allergies  Current Facility-Administered Medications   Medication Frequency    0.9%  NaCl infusion PRN    acetaminophen tablet 650 mg Q4H PRN    ALPRAZolam tablet 0.5 mg TID PRN    amLODIPine tablet 10 mg Daily    amoxicillin-clavulanate 500-125mg per tablet 500 mg Daily    buPROPion TBSR 12 hr tablet 150 mg BID    carvediloL tablet 25 mg BID WM    cloNIDine tablet 0.1 mg TID    dextrose 50% injection 12.5 g PRN    dextrose 50% injection 25 g PRN    docusate sodium capsule 100 mg BID    ferrous sulfate tablet 1 each Daily    glucagon (human recombinant) injection 1 mg PRN    glucagon (human recombinant) injection 1 mg PRN    glucose chewable tablet 16 g PRN    glucose chewable tablet 24 g PRN    heparin (porcine) injection 5,000 Units Q8H    hydrALAZINE tablet 25 mg Q8H PRN    insulin aspart U-100 injection 0-5 Units QID (AC + HS) PRN    insulin aspart U-100 injection 4 Units TIDWM    insulin detemir U-100 injection 12 Units QHS    isosorbide mononitrate 24 hr tablet 60 mg Daily    labetaloL injection 10 mg Q4H PRN    lactulose 20 gram/30 mL solution Soln 20 g TID    linaCLOtide capsule 145 mcg Daily PRN    losartan tablet 100 mg QHS    melatonin tablet 6 mg Nightly PRN    naloxone 0.4 mg/mL injection 0.02 mg PRN    ondansetron injection 4 mg Q8H PRN    pantoprazole EC tablet 40 mg Daily    sevelamer carbonate tablet 800 mg TIDWM    silver sulfADIAZINE 1% cream Daily PRN    sodium chloride 0.9%  flush 10 mL Q12H PRN       Objective:     Vital Signs (Most Recent):  Temp: 99.2 °F (37.3 °C) (06/10/23 0700)  Pulse: 82 (06/10/23 0700)  Resp: 20 (06/10/23 0700)  BP: (!) 185/48 (06/10/23 0700)  SpO2: 100 % (06/10/23 0700) Vital Signs (24h Range):  Temp:  [97.2 °F (36.2 °C)-99.2 °F (37.3 °C)] 99.2 °F (37.3 °C)  Pulse:  [75-95] 82  Resp:  [16-20] 20  SpO2:  [99 %-100 %] 100 %  BP: (120-185)/(31-77) 185/48     Weight: 72.7 kg (160 lb 4.4 oz) (06/10/23 0022)  Body mass index is 28.39 kg/m².  Body surface area is 1.8 meters squared.    I/O last 3 completed shifts:  In: 840 [P.O.:840]  Out: 1730 [Other:1730]     Physical Exam  Vitals reviewed.   HENT:      Head: Normocephalic and atraumatic.      Mouth/Throat:      Mouth: Mucous membranes are moist.   Eyes:      Pupils: Pupils are equal, round, and reactive to light.   Cardiovascular:      Rate and Rhythm: Regular rhythm.   Pulmonary:      Effort: Pulmonary effort is normal.   Abdominal:      Palpations: Abdomen is soft.   Skin:     General: Skin is warm.   Neurological:      Mental Status: He is alert.   Psychiatric:         Mood and Affect: Mood normal.        Significant Labs:  BMP:   Recent Labs   Lab 06/10/23  0456   GLU 45*      K 4.6      CO2 27   BUN 43*   CREATININE 4.41*   CALCIUM 8.6     CBC:   Recent Labs   Lab 06/10/23  0456   WBC 7.68   RBC 3.14*   HGB 8.7*   HCT 28.4*      MCV 90.4   MCH 27.7   MCHC 30.6*        Significant Imaging:  Labs: Reviewed    Assessment/Plan:     Renal/  ESRD (end stage renal disease)  Continue with scheduled hemodialysis for this patient.    ID  * Osteomyelitis of left foot  Broad spectrum antibiotics    Endocrine  Type 2 diabetes mellitus with kidney complication, with long-term current use of insulin  Chronic condition        Thank you for your consult. I will follow-up with patient. Please contact us if you have any additional questions.    Fox Brown Jr, MD  Nephrology  [facility]

## 2023-06-10 NOTE — SUBJECTIVE & OBJECTIVE
Interval History: The patient is sitting up in bed.  He voices no complaints today.    Review of patient's allergies indicates:  No Known Allergies  Current Facility-Administered Medications   Medication Frequency    0.9%  NaCl infusion PRN    acetaminophen tablet 650 mg Q4H PRN    ALPRAZolam tablet 0.5 mg TID PRN    amLODIPine tablet 10 mg Daily    amoxicillin-clavulanate 500-125mg per tablet 500 mg Daily    buPROPion TBSR 12 hr tablet 150 mg BID    carvediloL tablet 25 mg BID WM    cloNIDine tablet 0.1 mg TID    dextrose 50% injection 12.5 g PRN    dextrose 50% injection 25 g PRN    docusate sodium capsule 100 mg BID    ferrous sulfate tablet 1 each Daily    glucagon (human recombinant) injection 1 mg PRN    glucagon (human recombinant) injection 1 mg PRN    glucose chewable tablet 16 g PRN    glucose chewable tablet 24 g PRN    heparin (porcine) injection 5,000 Units Q8H    hydrALAZINE tablet 25 mg Q8H PRN    insulin aspart U-100 injection 0-5 Units QID (AC + HS) PRN    insulin aspart U-100 injection 4 Units TIDWM    insulin detemir U-100 injection 12 Units QHS    isosorbide mononitrate 24 hr tablet 60 mg Daily    labetaloL injection 10 mg Q4H PRN    lactulose 20 gram/30 mL solution Soln 20 g TID    linaCLOtide capsule 145 mcg Daily PRN    losartan tablet 100 mg QHS    melatonin tablet 6 mg Nightly PRN    naloxone 0.4 mg/mL injection 0.02 mg PRN    ondansetron injection 4 mg Q8H PRN    pantoprazole EC tablet 40 mg Daily    sevelamer carbonate tablet 800 mg TIDWM    silver sulfADIAZINE 1% cream Daily PRN    sodium chloride 0.9% flush 10 mL Q12H PRN       Objective:     Vital Signs (Most Recent):  Temp: 99.2 °F (37.3 °C) (06/10/23 0700)  Pulse: 82 (06/10/23 0700)  Resp: 20 (06/10/23 0700)  BP: (!) 185/48 (06/10/23 0700)  SpO2: 100 % (06/10/23 0700) Vital Signs (24h Range):  Temp:  [97.2 °F (36.2 °C)-99.2 °F (37.3 °C)] 99.2 °F (37.3 °C)  Pulse:  [75-95] 82  Resp:  [16-20] 20  SpO2:  [99 %-100 %] 100 %  BP:  (120-185)/(31-77) 185/48     Weight: 72.7 kg (160 lb 4.4 oz) (06/10/23 0022)  Body mass index is 28.39 kg/m².  Body surface area is 1.8 meters squared.    I/O last 3 completed shifts:  In: 840 [P.O.:840]  Out: 1730 [Other:1730]     Physical Exam  Vitals reviewed.   HENT:      Head: Normocephalic and atraumatic.      Mouth/Throat:      Mouth: Mucous membranes are moist.   Eyes:      Pupils: Pupils are equal, round, and reactive to light.   Cardiovascular:      Rate and Rhythm: Regular rhythm.   Pulmonary:      Effort: Pulmonary effort is normal.   Abdominal:      Palpations: Abdomen is soft.   Skin:     General: Skin is warm.   Neurological:      Mental Status: He is alert.   Psychiatric:         Mood and Affect: Mood normal.        Significant Labs:  BMP:   Recent Labs   Lab 06/10/23  0456   GLU 45*      K 4.6      CO2 27   BUN 43*   CREATININE 4.41*   CALCIUM 8.6     CBC:   Recent Labs   Lab 06/10/23  0456   WBC 7.68   RBC 3.14*   HGB 8.7*   HCT 28.4*      MCV 90.4   MCH 27.7   MCHC 30.6*        Significant Imaging:  Labs: Reviewed

## 2023-06-10 NOTE — NURSING
Dressing changes done. Left 1st and 2nd toe and heel and right gluteal all cleaned with soap and water, and then vashe. Silvedene to wound and then covered with Drawtex soaked with vashe and secured with kerlix. Sacreal and left gluteal healed wounds intact and pink cleaned with soap and water and covered with foam dressings for protection

## 2023-06-11 LAB
GLUCOSE SERPL-MCNC: 150 MG/DL (ref 70–105)
GLUCOSE SERPL-MCNC: 179 MG/DL (ref 70–105)
GLUCOSE SERPL-MCNC: 241 MG/DL (ref 70–105)
GLUCOSE SERPL-MCNC: 300 MG/DL (ref 70–105)

## 2023-06-11 PROCEDURE — 63600175 PHARM REV CODE 636 W HCPCS: Performed by: HOSPITALIST

## 2023-06-11 PROCEDURE — 25000003 PHARM REV CODE 250: Performed by: HOSPITALIST

## 2023-06-11 PROCEDURE — 99233 PR SUBSEQUENT HOSPITAL CARE,LEVL III: ICD-10-PCS | Mod: ,,, | Performed by: HOSPITALIST

## 2023-06-11 PROCEDURE — 82962 GLUCOSE BLOOD TEST: CPT

## 2023-06-11 PROCEDURE — 63600175 PHARM REV CODE 636 W HCPCS: Performed by: NURSE PRACTITIONER

## 2023-06-11 PROCEDURE — 99233 SBSQ HOSP IP/OBS HIGH 50: CPT | Mod: ,,, | Performed by: HOSPITALIST

## 2023-06-11 PROCEDURE — 11000001 HC ACUTE MED/SURG PRIVATE ROOM

## 2023-06-11 PROCEDURE — 25000003 PHARM REV CODE 250: Performed by: FAMILY MEDICINE

## 2023-06-11 PROCEDURE — 63600175 PHARM REV CODE 636 W HCPCS: Performed by: FAMILY MEDICINE

## 2023-06-11 PROCEDURE — 25000003 PHARM REV CODE 250: Performed by: NURSE PRACTITIONER

## 2023-06-11 RX ADMIN — HEPARIN SODIUM 5000 UNITS: 5000 INJECTION INTRAVENOUS; SUBCUTANEOUS at 05:06

## 2023-06-11 RX ADMIN — CLONIDINE HYDROCHLORIDE 0.1 MG: 0.1 TABLET ORAL at 02:06

## 2023-06-11 RX ADMIN — CARVEDILOL 25 MG: 25 TABLET, FILM COATED ORAL at 04:06

## 2023-06-11 RX ADMIN — HYDRALAZINE HYDROCHLORIDE 25 MG: 25 TABLET ORAL at 04:06

## 2023-06-11 RX ADMIN — HEPARIN SODIUM 5000 UNITS: 5000 INJECTION INTRAVENOUS; SUBCUTANEOUS at 09:06

## 2023-06-11 RX ADMIN — LACTULOSE 20 G: 20 SOLUTION ORAL at 08:06

## 2023-06-11 RX ADMIN — INSULIN ASPART 4 UNITS: 100 INJECTION, SOLUTION INTRAVENOUS; SUBCUTANEOUS at 04:06

## 2023-06-11 RX ADMIN — CARVEDILOL 25 MG: 25 TABLET, FILM COATED ORAL at 08:06

## 2023-06-11 RX ADMIN — DOCUSATE SODIUM 100 MG: 100 CAPSULE, LIQUID FILLED ORAL at 09:06

## 2023-06-11 RX ADMIN — LACTULOSE 20 G: 20 SOLUTION ORAL at 02:06

## 2023-06-11 RX ADMIN — PANTOPRAZOLE SODIUM 40 MG: 40 TABLET, DELAYED RELEASE ORAL at 08:06

## 2023-06-11 RX ADMIN — AMLODIPINE BESYLATE 10 MG: 10 TABLET ORAL at 08:06

## 2023-06-11 RX ADMIN — SEVELAMER CARBONATE 800 MG: 800 TABLET, FILM COATED ORAL at 04:06

## 2023-06-11 RX ADMIN — INSULIN ASPART 4 UNITS: 100 INJECTION, SOLUTION INTRAVENOUS; SUBCUTANEOUS at 11:06

## 2023-06-11 RX ADMIN — ISOSORBIDE MONONITRATE 60 MG: 60 TABLET, EXTENDED RELEASE ORAL at 08:06

## 2023-06-11 RX ADMIN — CLONIDINE HYDROCHLORIDE 0.1 MG: 0.1 TABLET ORAL at 08:06

## 2023-06-11 RX ADMIN — DOCUSATE SODIUM 100 MG: 100 CAPSULE, LIQUID FILLED ORAL at 08:06

## 2023-06-11 RX ADMIN — AMPICILLIN SODIUM 2 G: 2 INJECTION, POWDER, FOR SOLUTION INTRAMUSCULAR; INTRAVENOUS at 09:06

## 2023-06-11 RX ADMIN — SEVELAMER CARBONATE 800 MG: 800 TABLET, FILM COATED ORAL at 06:06

## 2023-06-11 RX ADMIN — INSULIN ASPART 4 UNITS: 100 INJECTION, SOLUTION INTRAVENOUS; SUBCUTANEOUS at 06:06

## 2023-06-11 RX ADMIN — INSULIN DETEMIR 12 UNITS: 100 INJECTION, SOLUTION SUBCUTANEOUS at 09:06

## 2023-06-11 RX ADMIN — INSULIN ASPART 4 UNITS: 100 INJECTION, SOLUTION INTRAVENOUS; SUBCUTANEOUS at 07:06

## 2023-06-11 RX ADMIN — AMPICILLIN SODIUM 2 G: 2 INJECTION, POWDER, FOR SOLUTION INTRAMUSCULAR; INTRAVENOUS at 08:06

## 2023-06-11 RX ADMIN — BUPROPION HYDROCHLORIDE 150 MG: 150 TABLET, FILM COATED, EXTENDED RELEASE ORAL at 09:06

## 2023-06-11 RX ADMIN — FERROUS SULFATE TAB 325 MG (65 MG ELEMENTAL FE) 1 EACH: 325 (65 FE) TAB at 08:06

## 2023-06-11 RX ADMIN — CLONIDINE HYDROCHLORIDE 0.1 MG: 0.1 TABLET ORAL at 09:06

## 2023-06-11 RX ADMIN — SEVELAMER CARBONATE 800 MG: 800 TABLET, FILM COATED ORAL at 11:06

## 2023-06-11 RX ADMIN — HEPARIN SODIUM 5000 UNITS: 5000 INJECTION INTRAVENOUS; SUBCUTANEOUS at 02:06

## 2023-06-11 RX ADMIN — BUPROPION HYDROCHLORIDE 150 MG: 150 TABLET, FILM COATED, EXTENDED RELEASE ORAL at 08:06

## 2023-06-11 RX ADMIN — LOSARTAN POTASSIUM 100 MG: 100 TABLET, FILM COATED ORAL at 09:06
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
## 2023-06-11 NOTE — SUBJECTIVE & OBJECTIVE
Interval History:     Review of Systems   Constitutional:  Negative for appetite change, fatigue and fever.   HENT:  Negative for congestion, hearing loss and trouble swallowing.    Respiratory:  Negative for chest tightness, shortness of breath and wheezing.    Cardiovascular:  Negative for chest pain and palpitations.   Gastrointestinal:  Positive for constipation. Negative for abdominal pain and nausea.   Genitourinary:  Positive for difficulty urinating. Negative for dysuria.   Musculoskeletal:  Positive for gait problem. Negative for back pain and neck stiffness.   Skin:  Positive for wound. Negative for pallor and rash.   Neurological:  Positive for weakness. Negative for dizziness, speech difficulty and headaches.   Psychiatric/Behavioral:  Negative for confusion and suicidal ideas.    Objective:     Vital Signs (Most Recent):  Temp: 98.6 °F (37 °C) (06/11/23 1200)  Pulse: 74 (06/11/23 1200)  Resp: 18 (06/11/23 1200)  BP: (!) 155/49 (06/11/23 1200)  SpO2: 100 % (06/11/23 1200) Vital Signs (24h Range):  Temp:  [98.1 °F (36.7 °C)-98.9 °F (37.2 °C)] 98.6 °F (37 °C)  Pulse:  [73-84] 74  Resp:  [18-20] 18  SpO2:  [99 %-100 %] 100 %  BP: (151-186)/(49-70) 155/49     Weight: 72.7 kg (160 lb 4.4 oz)  Body mass index is 28.39 kg/m².    Intake/Output Summary (Last 24 hours) at 6/11/2023 1228  Last data filed at 6/10/2023 1840  Gross per 24 hour   Intake 700 ml   Output --   Net 700 ml         Physical Exam  Vitals reviewed.   Constitutional:       General: He is awake. He is not in acute distress.     Appearance: He is well-developed. He is not toxic-appearing.   HENT:      Head: Normocephalic.      Nose: Nose normal.      Mouth/Throat:      Pharynx: Oropharynx is clear.   Eyes:      Extraocular Movements: Extraocular movements intact.      Pupils: Pupils are equal, round, and reactive to light.   Neck:      Thyroid: No thyroid mass.      Vascular: No carotid bruit.   Cardiovascular:      Rate and Rhythm: Normal rate  and regular rhythm.      Pulses: Normal pulses.      Heart sounds: Normal heart sounds. No murmur heard.  Pulmonary:      Effort: Pulmonary effort is normal.      Breath sounds: Normal breath sounds and air entry. No wheezing.   Abdominal:      General: Bowel sounds are normal. There is no distension.      Palpations: Abdomen is soft.      Tenderness: There is no abdominal tenderness.   Musculoskeletal:         General: Signs of injury present. Normal range of motion.      Cervical back: Neck supple. No rigidity.      Comments: Wounds to 1st and 2nd digit left foot that are currently dressed   Skin:     General: Skin is warm.      Coloration: Skin is not jaundiced.      Findings: No lesion.   Neurological:      Mental Status: He is alert and oriented to person, place, and time.      Cranial Nerves: No cranial nerve deficit.      Motor: Weakness present.      Gait: Gait abnormal.      Comments:  1 over 5  weakness to bilateral lower extremities   Psychiatric:         Attention and Perception: Attention normal.         Mood and Affect: Mood normal.         Behavior: Behavior normal. Behavior is cooperative.         Thought Content: Thought content normal.         Cognition and Memory: Cognition normal.           Significant Labs: All pertinent labs within the past 24 hours have been reviewed.    Significant Imaging: I have reviewed all pertinent imaging results/findings within the past 24 hours.

## 2023-06-11 NOTE — SUBJECTIVE & OBJECTIVE
Interval History:     Review of Systems   Constitutional:  Negative for appetite change, fatigue and fever.   HENT:  Negative for congestion, hearing loss and trouble swallowing.    Respiratory:  Negative for chest tightness, shortness of breath and wheezing.    Cardiovascular:  Negative for chest pain and palpitations.   Gastrointestinal:  Positive for constipation. Negative for abdominal pain and nausea.   Genitourinary:  Positive for difficulty urinating. Negative for dysuria.   Musculoskeletal:  Positive for gait problem. Negative for back pain and neck stiffness.   Skin:  Positive for wound. Negative for pallor and rash.   Neurological:  Positive for weakness. Negative for dizziness, speech difficulty and headaches.   Psychiatric/Behavioral:  Negative for confusion and suicidal ideas.    Objective:     Vital Signs (Most Recent):  Temp: 98.4 °F (36.9 °C) (06/10/23 2000)  Pulse: 78 (06/10/23 2000)  Resp: 20 (06/10/23 2000)  BP: (!) 153/59 (nurse aware) (06/10/23 2000)  SpO2: 99 % (06/10/23 2000) Vital Signs (24h Range):  Temp:  [98.1 °F (36.7 °C)-99.2 °F (37.3 °C)] 98.4 °F (36.9 °C)  Pulse:  [78-95] 78  Resp:  [16-20] 20  SpO2:  [99 %-100 %] 99 %  BP: (144-185)/(48-68) 153/59     Weight: 72.7 kg (160 lb 4.4 oz)  Body mass index is 28.39 kg/m².    Intake/Output Summary (Last 24 hours) at 6/10/2023 2338  Last data filed at 6/10/2023 1840  Gross per 24 hour   Intake 940 ml   Output --   Net 940 ml         Physical Exam  Vitals reviewed.   Constitutional:       General: He is awake. He is not in acute distress.     Appearance: He is well-developed. He is not toxic-appearing.   HENT:      Head: Normocephalic.      Nose: Nose normal.      Mouth/Throat:      Pharynx: Oropharynx is clear.   Eyes:      Extraocular Movements: Extraocular movements intact.      Pupils: Pupils are equal, round, and reactive to light.   Neck:      Thyroid: No thyroid mass.      Vascular: No carotid bruit.   Cardiovascular:      Rate and  Rhythm: Normal rate and regular rhythm.      Pulses: Normal pulses.      Heart sounds: Normal heart sounds. No murmur heard.  Pulmonary:      Effort: Pulmonary effort is normal.      Breath sounds: Normal breath sounds and air entry. No wheezing.   Abdominal:      General: Bowel sounds are normal. There is no distension.      Palpations: Abdomen is soft.      Tenderness: There is no abdominal tenderness.   Musculoskeletal:         General: Signs of injury present. Normal range of motion.      Cervical back: Neck supple. No rigidity.      Comments: Wounds to 1st and 2nd digit left foot that are currently dressed   Skin:     General: Skin is warm.      Coloration: Skin is not jaundiced.      Findings: No lesion.   Neurological:      Mental Status: He is alert and oriented to person, place, and time.      Cranial Nerves: No cranial nerve deficit.      Motor: Weakness present.      Gait: Gait abnormal.      Comments:  1 over 5  weakness to bilateral lower extremities   Psychiatric:         Attention and Perception: Attention normal.         Mood and Affect: Mood normal.         Behavior: Behavior normal. Behavior is cooperative.         Thought Content: Thought content normal.         Cognition and Memory: Cognition normal.           Significant Labs: All pertinent labs within the past 24 hours have been reviewed.    Significant Imaging: I have reviewed all pertinent imaging results/findings within the past 24 hours.

## 2023-06-11 NOTE — PROGRESS NOTES
[facility]  Nephrology  Progress Note    Patient Name: Lee Escobar  MRN: [id number]  Admission Date: 5/26/2023  Hospital Length of Stay: 16 days  Attending Provider: Mimi Arizmendi MD   Primary Care Physician: Maria Elena Solorio II, MD  Principal Problem:Osteomyelitis of left foot    Subjective:     HPI: No notes on file    Interval History: The patient is doing acceptable.  He voices no complaints.  No shortness of breath or chest pain.    Review of patient's allergies indicates:  No Known Allergies  Current Facility-Administered Medications   Medication Frequency    0.9%  NaCl infusion PRN    acetaminophen tablet 650 mg Q4H PRN    ALPRAZolam tablet 0.5 mg TID PRN    amLODIPine tablet 10 mg Daily    ampicillin (OMNIPEN) 2 g in sodium chloride 0.9 % 100 mL IVPB (MB+) Q12H    buPROPion TBSR 12 hr tablet 150 mg BID    carvediloL tablet 25 mg BID WM    cloNIDine tablet 0.1 mg TID    dextrose 50% injection 12.5 g PRN    dextrose 50% injection 25 g PRN    docusate sodium capsule 100 mg BID    ferrous sulfate tablet 1 each Daily    glucagon (human recombinant) injection 1 mg PRN    glucagon (human recombinant) injection 1 mg PRN    glucose chewable tablet 16 g PRN    glucose chewable tablet 24 g PRN    heparin (porcine) injection 5,000 Units Q8H    hydrALAZINE tablet 25 mg Q8H PRN    insulin aspart U-100 injection 0-5 Units QID (AC + HS) PRN    insulin aspart U-100 injection 4 Units TIDWM    insulin detemir U-100 injection 12 Units QHS    isosorbide mononitrate 24 hr tablet 60 mg Daily    labetaloL injection 10 mg Q4H PRN    lactulose 20 gram/30 mL solution Soln 20 g TID    linaCLOtide capsule 145 mcg Daily PRN    losartan tablet 100 mg QHS    melatonin tablet 6 mg Nightly PRN    naloxone 0.4 mg/mL injection 0.02 mg PRN    ondansetron injection 4 mg Q8H PRN    pantoprazole EC tablet 40 mg Daily    sevelamer carbonate tablet 800 mg TIDWM    silver sulfADIAZINE  1% cream Daily PRN    sodium chloride 0.9% flush 10 mL Q12H PRN       Objective:     Vital Signs (Most Recent):  Temp: 98.9 °F (37.2 °C) (06/11/23 0700)  Pulse: 78 (06/11/23 0700)  Resp: 18 (06/11/23 0700)  BP: (!) 171/53 (06/11/23 0826)  SpO2: 100 % (06/11/23 0700) Vital Signs (24h Range):  Temp:  [98.1 °F (36.7 °C)-98.9 °F (37.2 °C)] 98.9 °F (37.2 °C)  Pulse:  [73-84] 78  Resp:  [18-20] 18  SpO2:  [99 %-100 %] 100 %  BP: (151-186)/(53-70) 171/53     Weight: 72.7 kg (160 lb 4.4 oz) (06/10/23 0022)  Body mass index is 28.39 kg/m².  Body surface area is 1.8 meters squared.    I/O last 3 completed shifts:  In: 940 [P.O.:840; IV Piggyback:100]  Out: -      Physical Exam  Vitals reviewed.   Constitutional:       Appearance: Normal appearance.   HENT:      Head: Normocephalic and atraumatic.      Mouth/Throat:      Mouth: Mucous membranes are moist.   Cardiovascular:      Rate and Rhythm: Regular rhythm.   Pulmonary:      Effort: Pulmonary effort is normal.   Abdominal:      Palpations: Abdomen is soft.   Neurological:      Mental Status: He is alert.   Psychiatric:         Mood and Affect: Mood normal.        Significant Labs:  BMP:   Recent Labs   Lab 06/10/23  0456   GLU 45*      K 4.6      CO2 27   BUN 43*   CREATININE 4.41*   CALCIUM 8.6     CBC:   Recent Labs   Lab 06/10/23  0456   WBC 7.68   RBC 3.14*   HGB 8.7*   HCT 28.4*      MCV 90.4   MCH 27.7   MCHC 30.6*     All labs within the past 24 hours have been reviewed.     Significant Imaging:  Labs: Reviewed    Assessment/Plan:     Renal/  ESRD (end stage renal disease)  Continue with scheduled hemodialysis for this patient.    Endocrine  Type 2 diabetes mellitus with kidney complication, with long-term current use of insulin  Chronic condition        Thank you for your consult. I will follow-up with patient. Please contact us if you have any additional questions.    Fox Brown Jr, MD  Nephrology  [facility]

## 2023-06-11 NOTE — PLAN OF CARE
Problem: Adult Inpatient Plan of Care  Goal: Patient-Specific Goal (Individualized)  Outcome: Ongoing, Progressing     Problem: Adult Inpatient Plan of Care  Goal: Plan of Care Review  Outcome: Ongoing, Progressing     Problem: Adult Inpatient Plan of Care  Goal: Absence of Hospital-Acquired Illness or Injury  Outcome: Ongoing, Progressing

## 2023-06-11 NOTE — SUBJECTIVE & OBJECTIVE
Interval History: The patient is doing acceptable.  He voices no complaints.  No shortness of breath or chest pain.    Review of patient's allergies indicates:  No Known Allergies  Current Facility-Administered Medications   Medication Frequency    0.9%  NaCl infusion PRN    acetaminophen tablet 650 mg Q4H PRN    ALPRAZolam tablet 0.5 mg TID PRN    amLODIPine tablet 10 mg Daily    ampicillin (OMNIPEN) 2 g in sodium chloride 0.9 % 100 mL IVPB (MB+) Q12H    buPROPion TBSR 12 hr tablet 150 mg BID    carvediloL tablet 25 mg BID WM    cloNIDine tablet 0.1 mg TID    dextrose 50% injection 12.5 g PRN    dextrose 50% injection 25 g PRN    docusate sodium capsule 100 mg BID    ferrous sulfate tablet 1 each Daily    glucagon (human recombinant) injection 1 mg PRN    glucagon (human recombinant) injection 1 mg PRN    glucose chewable tablet 16 g PRN    glucose chewable tablet 24 g PRN    heparin (porcine) injection 5,000 Units Q8H    hydrALAZINE tablet 25 mg Q8H PRN    insulin aspart U-100 injection 0-5 Units QID (AC + HS) PRN    insulin aspart U-100 injection 4 Units TIDWM    insulin detemir U-100 injection 12 Units QHS    isosorbide mononitrate 24 hr tablet 60 mg Daily    labetaloL injection 10 mg Q4H PRN    lactulose 20 gram/30 mL solution Soln 20 g TID    linaCLOtide capsule 145 mcg Daily PRN    losartan tablet 100 mg QHS    melatonin tablet 6 mg Nightly PRN    naloxone 0.4 mg/mL injection 0.02 mg PRN    ondansetron injection 4 mg Q8H PRN    pantoprazole EC tablet 40 mg Daily    sevelamer carbonate tablet 800 mg TIDWM    silver sulfADIAZINE 1% cream Daily PRN    sodium chloride 0.9% flush 10 mL Q12H PRN       Objective:     Vital Signs (Most Recent):  Temp: 98.9 °F (37.2 °C) (06/11/23 0700)  Pulse: 78 (06/11/23 0700)  Resp: 18 (06/11/23 0700)  BP: (!) 171/53 (06/11/23 0826)  SpO2: 100 % (06/11/23 0700) Vital Signs (24h Range):  Temp:  [98.1 °F (36.7 °C)-98.9 °F (37.2 °C)] 98.9 °F (37.2 °C)  Pulse:  [73-84] 78  Resp:   [18-20] 18  SpO2:  [99 %-100 %] 100 %  BP: (151-186)/(53-70) 171/53     Weight: 72.7 kg (160 lb 4.4 oz) (06/10/23 0022)  Body mass index is 28.39 kg/m².  Body surface area is 1.8 meters squared.    I/O last 3 completed shifts:  In: 940 [P.O.:840; IV Piggyback:100]  Out: -      Physical Exam  Vitals reviewed.   Constitutional:       Appearance: Normal appearance.   HENT:      Head: Normocephalic and atraumatic.      Mouth/Throat:      Mouth: Mucous membranes are moist.   Cardiovascular:      Rate and Rhythm: Regular rhythm.   Pulmonary:      Effort: Pulmonary effort is normal.   Abdominal:      Palpations: Abdomen is soft.   Neurological:      Mental Status: He is alert.   Psychiatric:         Mood and Affect: Mood normal.        Significant Labs:  BMP:   Recent Labs   Lab 06/10/23  0456   GLU 45*      K 4.6      CO2 27   BUN 43*   CREATININE 4.41*   CALCIUM 8.6     CBC:   Recent Labs   Lab 06/10/23  0456   WBC 7.68   RBC 3.14*   HGB 8.7*   HCT 28.4*      MCV 90.4   MCH 27.7   MCHC 30.6*     All labs within the past 24 hours have been reviewed.     Significant Imaging:  Labs: Reviewed

## 2023-06-12 LAB
ALBUMIN SERPL BCP-MCNC: 2.4 G/DL (ref 3.5–5)
ANION GAP SERPL CALCULATED.3IONS-SCNC: 17 MMOL/L (ref 7–16)
BUN SERPL-MCNC: 110 MG/DL (ref 7–18)
BUN/CREAT SERPL: 16 (ref 6–20)
CALCIUM SERPL-MCNC: 8.5 MG/DL (ref 8.5–10.1)
CHLORIDE SERPL-SCNC: 97 MMOL/L (ref 98–107)
CO2 SERPL-SCNC: 27 MMOL/L (ref 21–32)
CREAT SERPL-MCNC: 6.9 MG/DL (ref 0.7–1.3)
EGFR (NO RACE VARIABLE) (RUSH/TITUS): 10 ML/MIN/1.73M2
GLUCOSE SERPL-MCNC: 118 MG/DL (ref 70–105)
GLUCOSE SERPL-MCNC: 142 MG/DL (ref 70–105)
GLUCOSE SERPL-MCNC: 215 MG/DL (ref 74–106)
GLUCOSE SERPL-MCNC: 231 MG/DL (ref 70–105)
GLUCOSE SERPL-MCNC: 75 MG/DL (ref 70–105)
PHOSPHATE SERPL-MCNC: 5.4 MG/DL (ref 2.5–4.5)
POTASSIUM SERPL-SCNC: 6.5 MMOL/L (ref 3.5–5.1)
SODIUM SERPL-SCNC: 134 MMOL/L (ref 136–145)

## 2023-06-12 PROCEDURE — 80069 RENAL FUNCTION PANEL: CPT | Performed by: HOSPITALIST

## 2023-06-12 PROCEDURE — 63600175 PHARM REV CODE 636 W HCPCS: Performed by: NURSE PRACTITIONER

## 2023-06-12 PROCEDURE — 99232 PR SUBSEQUENT HOSPITAL CARE,LEVL II: ICD-10-PCS | Mod: ,,, | Performed by: HOSPITALIST

## 2023-06-12 PROCEDURE — 99232 SBSQ HOSP IP/OBS MODERATE 35: CPT | Mod: ,,, | Performed by: HOSPITALIST

## 2023-06-12 PROCEDURE — 25000003 PHARM REV CODE 250: Performed by: NURSE PRACTITIONER

## 2023-06-12 PROCEDURE — 63600175 PHARM REV CODE 636 W HCPCS: Performed by: HOSPITALIST

## 2023-06-12 PROCEDURE — 82962 GLUCOSE BLOOD TEST: CPT

## 2023-06-12 PROCEDURE — 25000003 PHARM REV CODE 250: Performed by: INTERNAL MEDICINE

## 2023-06-12 PROCEDURE — 11000001 HC ACUTE MED/SURG PRIVATE ROOM

## 2023-06-12 PROCEDURE — 25000003 PHARM REV CODE 250: Performed by: HOSPITALIST

## 2023-06-12 PROCEDURE — 80100014 HC HEMODIALYSIS 1:1

## 2023-06-12 PROCEDURE — 25000003 PHARM REV CODE 250: Performed by: FAMILY MEDICINE

## 2023-06-12 RX ADMIN — CARVEDILOL 25 MG: 25 TABLET, FILM COATED ORAL at 05:06

## 2023-06-12 RX ADMIN — INSULIN ASPART 4 UNITS: 100 INJECTION, SOLUTION INTRAVENOUS; SUBCUTANEOUS at 06:06

## 2023-06-12 RX ADMIN — LACTULOSE 20 G: 20 SOLUTION ORAL at 08:06

## 2023-06-12 RX ADMIN — CLONIDINE HYDROCHLORIDE 0.1 MG: 0.1 TABLET ORAL at 05:06

## 2023-06-12 RX ADMIN — DOCUSATE SODIUM 100 MG: 100 CAPSULE, LIQUID FILLED ORAL at 08:06

## 2023-06-12 RX ADMIN — SEVELAMER CARBONATE 800 MG: 800 TABLET, FILM COATED ORAL at 10:06

## 2023-06-12 RX ADMIN — LOSARTAN POTASSIUM 100 MG: 100 TABLET, FILM COATED ORAL at 08:06

## 2023-06-12 RX ADMIN — PANTOPRAZOLE SODIUM 40 MG: 40 TABLET, DELAYED RELEASE ORAL at 08:06

## 2023-06-12 RX ADMIN — FERROUS SULFATE TAB 325 MG (65 MG ELEMENTAL FE) 1 EACH: 325 (65 FE) TAB at 08:06

## 2023-06-12 RX ADMIN — BUPROPION HYDROCHLORIDE 150 MG: 150 TABLET, FILM COATED, EXTENDED RELEASE ORAL at 08:06

## 2023-06-12 RX ADMIN — SEVELAMER CARBONATE 800 MG: 800 TABLET, FILM COATED ORAL at 05:06

## 2023-06-12 RX ADMIN — SEVELAMER CARBONATE 800 MG: 800 TABLET, FILM COATED ORAL at 06:06

## 2023-06-12 RX ADMIN — SODIUM CHLORIDE: 9 INJECTION, SOLUTION INTRAVENOUS at 03:06

## 2023-06-12 RX ADMIN — HEPARIN SODIUM 5000 UNITS: 5000 INJECTION INTRAVENOUS; SUBCUTANEOUS at 06:06

## 2023-06-12 RX ADMIN — AMPICILLIN SODIUM 2 G: 2 INJECTION, POWDER, FOR SOLUTION INTRAMUSCULAR; INTRAVENOUS at 08:06

## 2023-06-12 RX ADMIN — INSULIN ASPART 4 UNITS: 100 INJECTION, SOLUTION INTRAVENOUS; SUBCUTANEOUS at 11:06

## 2023-06-12 RX ADMIN — ISOSORBIDE MONONITRATE 60 MG: 60 TABLET, EXTENDED RELEASE ORAL at 08:06

## 2023-06-12 RX ADMIN — ONDANSETRON HYDROCHLORIDE 4 MG: 2 SOLUTION INTRAMUSCULAR; INTRAVENOUS at 10:06

## 2023-06-12 RX ADMIN — CLONIDINE HYDROCHLORIDE 0.1 MG: 0.1 TABLET ORAL at 08:06

## 2023-06-12 RX ADMIN — HEPARIN SODIUM 5000 UNITS: 5000 INJECTION INTRAVENOUS; SUBCUTANEOUS at 09:06

## 2023-06-12 RX ADMIN — CARVEDILOL 25 MG: 25 TABLET, FILM COATED ORAL at 08:06

## 2023-06-12 RX ADMIN — AMLODIPINE BESYLATE 10 MG: 10 TABLET ORAL at 08:06

## 2023-06-12 RX ADMIN — LACTULOSE 20 G: 20 SOLUTION ORAL at 05:06

## 2023-06-12 NOTE — DIALYSIS ROUNDING
The patient was dialyzed was today without complication.There was a net fluid removal of 2 liters during the treatment.  PE   Lungs-clear  Cor-1/6 systolic murmur   Abd-soft    Plan:  Continue the present care

## 2023-06-12 NOTE — PLAN OF CARE
Problem: Impaired Wound Healing  Goal: Optimal Wound Healing  Outcome: Ongoing, Progressing     Problem: Infection  Goal: Absence of Infection Signs and Symptoms  Outcome: Ongoing, Progressing     Problem: Skin Injury Risk Increased  Goal: Skin Health and Integrity  Outcome: Ongoing, Progressing
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
## 2023-06-12 NOTE — SUBJECTIVE & OBJECTIVE
Interval History:     Review of Systems   Constitutional:  Negative for appetite change, fatigue and fever.   HENT:  Negative for congestion, hearing loss and trouble swallowing.    Respiratory:  Negative for chest tightness, shortness of breath and wheezing.    Cardiovascular:  Negative for chest pain and palpitations.   Gastrointestinal:  Positive for constipation. Negative for abdominal pain and nausea.   Genitourinary:  Positive for difficulty urinating. Negative for dysuria.   Musculoskeletal:  Positive for gait problem. Negative for back pain and neck stiffness.   Skin:  Positive for wound. Negative for pallor and rash.   Neurological:  Positive for weakness. Negative for dizziness, speech difficulty and headaches.   Psychiatric/Behavioral:  Negative for confusion and suicidal ideas.    Objective:     Vital Signs (Most Recent):  Temp: 98.1 °F (36.7 °C) (06/12/23 1342)  Pulse: 70 (06/12/23 1630)  Resp: 16 (06/12/23 1630)  BP: (!) 153/64 (06/12/23 1630)  SpO2: 97 % (06/12/23 0800) Vital Signs (24h Range):  Temp:  [97.5 °F (36.4 °C)-98.1 °F (36.7 °C)] 98.1 °F (36.7 °C)  Pulse:  [64-71] 70  Resp:  [16-18] 16  SpO2:  [96 %-97 %] 97 %  BP: (149-176)/(50-69) 153/64     Weight: 76 kg (167 lb 8.8 oz)  Body mass index is 29.68 kg/m².    Intake/Output Summary (Last 24 hours) at 6/12/2023 1659  Last data filed at 6/11/2023 2100  Gross per 24 hour   Intake 240 ml   Output --   Net 240 ml         Physical Exam  Vitals reviewed.   Constitutional:       General: He is awake. He is not in acute distress.     Appearance: He is well-developed. He is not toxic-appearing.   HENT:      Head: Normocephalic.      Nose: Nose normal.      Mouth/Throat:      Pharynx: Oropharynx is clear.   Eyes:      Extraocular Movements: Extraocular movements intact.      Pupils: Pupils are equal, round, and reactive to light.   Neck:      Thyroid: No thyroid mass.      Vascular: No carotid bruit.   Cardiovascular:      Rate and Rhythm: Normal rate  and regular rhythm.      Pulses: Normal pulses.      Heart sounds: Normal heart sounds. No murmur heard.  Pulmonary:      Effort: Pulmonary effort is normal.      Breath sounds: Normal breath sounds and air entry. No wheezing.   Abdominal:      General: Bowel sounds are normal. There is no distension.      Palpations: Abdomen is soft.      Tenderness: There is no abdominal tenderness.   Musculoskeletal:         General: Signs of injury present. Normal range of motion.      Cervical back: Neck supple. No rigidity.      Comments: Wounds to 1st and 2nd digit left foot that are currently dressed   Skin:     General: Skin is warm.      Coloration: Skin is not jaundiced.      Findings: No lesion.   Neurological:      Mental Status: He is alert and oriented to person, place, and time.      Cranial Nerves: No cranial nerve deficit.      Motor: Weakness present.      Gait: Gait abnormal.      Comments:  1 over 5  weakness to bilateral lower extremities   Psychiatric:         Attention and Perception: Attention normal.         Mood and Affect: Mood normal.         Behavior: Behavior normal. Behavior is cooperative.         Thought Content: Thought content normal.         Cognition and Memory: Cognition normal.           Significant Labs: All pertinent labs within the past 24 hours have been reviewed.    Significant Imaging: I have reviewed all pertinent imaging results/findings within the past 24 hours.

## 2023-06-13 LAB
GLUCOSE SERPL-MCNC: 124 MG/DL (ref 70–105)
GLUCOSE SERPL-MCNC: 192 MG/DL (ref 70–105)
GLUCOSE SERPL-MCNC: 222 MG/DL (ref 70–105)
GLUCOSE SERPL-MCNC: 97 MG/DL (ref 70–105)

## 2023-06-13 PROCEDURE — 25000003 PHARM REV CODE 250: Performed by: NURSE PRACTITIONER

## 2023-06-13 PROCEDURE — 11000001 HC ACUTE MED/SURG PRIVATE ROOM

## 2023-06-13 PROCEDURE — 82962 GLUCOSE BLOOD TEST: CPT

## 2023-06-13 PROCEDURE — 63600175 PHARM REV CODE 636 W HCPCS: Performed by: HOSPITALIST

## 2023-06-13 PROCEDURE — 99232 SBSQ HOSP IP/OBS MODERATE 35: CPT | Mod: ,,, | Performed by: HOSPITALIST

## 2023-06-13 PROCEDURE — 99232 SBSQ HOSP IP/OBS MODERATE 35: CPT | Mod: ,,, | Performed by: NURSE PRACTITIONER

## 2023-06-13 PROCEDURE — 25000003 PHARM REV CODE 250: Performed by: HOSPITALIST

## 2023-06-13 PROCEDURE — 25000003 PHARM REV CODE 250: Performed by: FAMILY MEDICINE

## 2023-06-13 PROCEDURE — 63600175 PHARM REV CODE 636 W HCPCS: Performed by: NURSE PRACTITIONER

## 2023-06-13 PROCEDURE — 99232 PR SUBSEQUENT HOSPITAL CARE,LEVL II: ICD-10-PCS | Mod: ,,, | Performed by: NURSE PRACTITIONER

## 2023-06-13 PROCEDURE — 99232 PR SUBSEQUENT HOSPITAL CARE,LEVL II: ICD-10-PCS | Mod: ,,, | Performed by: HOSPITALIST

## 2023-06-13 PROCEDURE — A6212 FOAM DRG <=16 SQ IN W/BORDER: HCPCS

## 2023-06-13 PROCEDURE — 36416 COLLJ CAPILLARY BLOOD SPEC: CPT

## 2023-06-13 RX ORDER — ISOSORBIDE MONONITRATE 30 MG/1
60 TABLET, EXTENDED RELEASE ORAL DAILY
Status: DISCONTINUED | OUTPATIENT
Start: 2023-06-14 | End: 2023-07-03

## 2023-06-13 RX ADMIN — CLONIDINE HYDROCHLORIDE 0.1 MG: 0.1 TABLET ORAL at 09:06

## 2023-06-13 RX ADMIN — LOSARTAN POTASSIUM 100 MG: 100 TABLET, FILM COATED ORAL at 09:06

## 2023-06-13 RX ADMIN — PANTOPRAZOLE SODIUM 40 MG: 40 TABLET, DELAYED RELEASE ORAL at 08:06

## 2023-06-13 RX ADMIN — CARVEDILOL 25 MG: 25 TABLET, FILM COATED ORAL at 08:06

## 2023-06-13 RX ADMIN — BUPROPION HYDROCHLORIDE 150 MG: 150 TABLET, FILM COATED, EXTENDED RELEASE ORAL at 08:06

## 2023-06-13 RX ADMIN — CLONIDINE HYDROCHLORIDE 0.1 MG: 0.1 TABLET ORAL at 02:06

## 2023-06-13 RX ADMIN — AMPICILLIN SODIUM 2 G: 2 INJECTION, POWDER, FOR SOLUTION INTRAMUSCULAR; INTRAVENOUS at 09:06

## 2023-06-13 RX ADMIN — BUPROPION HYDROCHLORIDE 150 MG: 150 TABLET, FILM COATED, EXTENDED RELEASE ORAL at 09:06

## 2023-06-13 RX ADMIN — CLONIDINE HYDROCHLORIDE 0.1 MG: 0.1 TABLET ORAL at 08:06

## 2023-06-13 RX ADMIN — SEVELAMER CARBONATE 800 MG: 800 TABLET, FILM COATED ORAL at 12:06

## 2023-06-13 RX ADMIN — HEPARIN SODIUM 5000 UNITS: 5000 INJECTION INTRAVENOUS; SUBCUTANEOUS at 02:06

## 2023-06-13 RX ADMIN — INSULIN ASPART 4 UNITS: 100 INJECTION, SOLUTION INTRAVENOUS; SUBCUTANEOUS at 06:06

## 2023-06-13 RX ADMIN — INSULIN ASPART 4 UNITS: 100 INJECTION, SOLUTION INTRAVENOUS; SUBCUTANEOUS at 12:06

## 2023-06-13 RX ADMIN — DOCUSATE SODIUM 100 MG: 100 CAPSULE, LIQUID FILLED ORAL at 08:06

## 2023-06-13 RX ADMIN — ISOSORBIDE MONONITRATE 60 MG: 60 TABLET, EXTENDED RELEASE ORAL at 08:06

## 2023-06-13 RX ADMIN — FERROUS SULFATE TAB 325 MG (65 MG ELEMENTAL FE) 1 EACH: 325 (65 FE) TAB at 08:06

## 2023-06-13 RX ADMIN — SEVELAMER CARBONATE 800 MG: 800 TABLET, FILM COATED ORAL at 05:06

## 2023-06-13 RX ADMIN — HEPARIN SODIUM 5000 UNITS: 5000 INJECTION INTRAVENOUS; SUBCUTANEOUS at 09:06

## 2023-06-13 RX ADMIN — AMPICILLIN SODIUM 2 G: 2 INJECTION, POWDER, FOR SOLUTION INTRAMUSCULAR; INTRAVENOUS at 08:06

## 2023-06-13 RX ADMIN — CARVEDILOL 25 MG: 25 TABLET, FILM COATED ORAL at 05:06

## 2023-06-13 RX ADMIN — AMLODIPINE BESYLATE 10 MG: 10 TABLET ORAL at 08:06

## 2023-06-13 RX ADMIN — SEVELAMER CARBONATE 800 MG: 800 TABLET, FILM COATED ORAL at 06:06

## 2023-06-13 RX ADMIN — DOCUSATE SODIUM 100 MG: 100 CAPSULE, LIQUID FILLED ORAL at 09:06

## 2023-06-13 RX ADMIN — HEPARIN SODIUM 5000 UNITS: 5000 INJECTION INTRAVENOUS; SUBCUTANEOUS at 06:06
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
## 2023-06-13 NOTE — PROGRESS NOTES
[facility]  Nephrology  Progress Note    Patient Name: Lee Escobar  MRN: [id number]  Admission Date: 5/26/2023  Hospital Length of Stay: 18 days  Attending Provider: Mimi Arizmendi MD   Primary Care Physician: Maria Elena Solorio II, MD  Principal Problem:Osteomyelitis of left foot    Consults  Subjective:     Interval History: The patient has no complaints today    Review of patient's allergies indicates:  No Known Allergies  Current Facility-Administered Medications   Medication Frequency    0.9%  NaCl infusion PRN    acetaminophen tablet 650 mg Q4H PRN    ALPRAZolam tablet 0.5 mg TID PRN    amLODIPine tablet 10 mg Daily    ampicillin (OMNIPEN) 2 g in sodium chloride 0.9 % 100 mL IVPB (MB+) Q12H    buPROPion TBSR 12 hr tablet 150 mg BID    carvediloL tablet 25 mg BID WM    cloNIDine tablet 0.1 mg TID    dextrose 50% injection 12.5 g PRN    dextrose 50% injection 25 g PRN    docusate sodium capsule 100 mg BID    ferrous sulfate tablet 1 each Daily    glucagon (human recombinant) injection 1 mg PRN    glucagon (human recombinant) injection 1 mg PRN    glucose chewable tablet 16 g PRN    glucose chewable tablet 24 g PRN    heparin (porcine) injection 5,000 Units Q8H    hydrALAZINE tablet 25 mg Q8H PRN    insulin aspart U-100 injection 0-5 Units QID (AC + HS) PRN    insulin aspart U-100 injection 4 Units TIDWM    insulin detemir U-100 injection 12 Units QHS    [START ON 6/14/2023] isosorbide mononitrate 24 hr tablet 60 mg Daily    labetaloL injection 10 mg Q4H PRN    lactulose 20 gram/30 mL solution Soln 20 g TID    linaCLOtide capsule 145 mcg Daily PRN    losartan tablet 100 mg QHS    melatonin tablet 6 mg Nightly PRN    naloxone 0.4 mg/mL injection 0.02 mg PRN    ondansetron injection 4 mg Q8H PRN    pantoprazole EC tablet 40 mg Daily    sevelamer carbonate tablet 800 mg TIDWM    silver sulfADIAZINE 1% cream Daily PRN    sodium chloride 0.9% flush 10 mL Q12H PRN       Objective:      Vital Signs (Most Recent):  Temp: 98.8 °F (37.1 °C) (06/13/23 1600)  Pulse: 76 (06/13/23 1600)  Resp: 18 (06/13/23 1600)  BP: (!) 161/66 (06/13/23 1600)  SpO2: 99 % (06/13/23 1600) Vital Signs (24h Range):  Temp:  [98.3 °F (36.8 °C)-99.1 °F (37.3 °C)] 98.8 °F (37.1 °C)  Pulse:  [72-95] 76  Resp:  [18] 18  SpO2:  [97 %-99 %] 99 %  BP: (152-190)/(51-66) 161/66     Weight: 76.5 kg (168 lb 10.4 oz) (06/13/23 0400)  Body mass index is 29.88 kg/m².  Body surface area is 1.84 meters squared.    I/O last 3 completed shifts:  In: 240 [P.O.:240]  Out: 2500 [Other:2500]    Physical Exam  Lungs-clear  Cor-2/6 systolic murmur   Abd-soft      Significant Labs:sureCBC:   Recent Labs   Lab 06/10/23  0456   WBC 7.68   RBC 3.14*   HGB 8.7*   HCT 28.4*      MCV 90.4   MCH 27.7   MCHC 30.6*     CMP:   Recent Labs   Lab 06/10/23  0456 06/12/23  0537   GLU 45* 215*   CALCIUM 8.6 8.5   ALBUMIN 2.6* 2.4*   PROT 6.5  --     134*   K 4.6 6.5*   CO2 27 27    97*   BUN 43* 110*   CREATININE 4.41* 6.90*   ALKPHOS 104  --    ALT 21  --    AST 24  --    BILITOT 0.3  --      All labs within the past 24 hours have been reviewed.    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]  Not Applicable     Chronic    Diabetic ulcer of toe of left foot associated with type 2 diabetes mellitus, with bone involvement without evidence of necrosis [E11.621, L97.526] 05/31/2023 Yes    Pressure injury of left heel, unstageable [L89.620] 05/31/2023 Yes    Pressure injury of right buttock, stage 2 [L89.312] 05/31/2023 Yes    HTN (hypertension) [I10] 05/25/2023 Yes     Chronic    Type 2 diabetes mellitus with kidney complication, with long-term current use of insulin [E11.29, Z79.4] 05/25/2023 Not Applicable      Chronic    Constipation [K59.00] 05/25/2023 Yes     Chronic    ESRD (end stage renal disease) [N18.6] 05/25/2023 Yes     Chronic    Wound infection [T14.8XXA, L08.9] 05/25/2023 Yes    Major depression [F32.9] 05/25/2023 Yes      Problems Resolved During this Admission:       Plan:  Hemodialysis tomorrow    Thank you for your consult. I will follow-up with patient. Please contact us if you have any additional questions.    Danny Peter MD  Nephrology  [facility]

## 2023-06-14 LAB
ALBUMIN SERPL BCP-MCNC: 2.4 G/DL (ref 3.5–5)
ANION GAP SERPL CALCULATED.3IONS-SCNC: 18 MMOL/L (ref 7–16)
BUN SERPL-MCNC: 118 MG/DL (ref 7–18)
BUN/CREAT SERPL: 16 (ref 6–20)
CALCIUM SERPL-MCNC: 9 MG/DL (ref 8.5–10.1)
CHLORIDE SERPL-SCNC: 97 MMOL/L (ref 98–107)
CO2 SERPL-SCNC: 26 MMOL/L (ref 21–32)
CREAT SERPL-MCNC: 7.25 MG/DL (ref 0.7–1.3)
EGFR (NO RACE VARIABLE) (RUSH/TITUS): 9 ML/MIN/1.73M2
GLUCOSE SERPL-MCNC: 127 MG/DL (ref 74–106)
GLUCOSE SERPL-MCNC: 130 MG/DL (ref 70–105)
GLUCOSE SERPL-MCNC: 156 MG/DL (ref 70–105)
GLUCOSE SERPL-MCNC: 166 MG/DL (ref 70–105)
GLUCOSE SERPL-MCNC: 90 MG/DL (ref 70–105)
PHOSPHATE SERPL-MCNC: 5.3 MG/DL (ref 2.5–4.5)
POTASSIUM SERPL-SCNC: 6.3 MMOL/L (ref 3.5–5.1)
SODIUM SERPL-SCNC: 135 MMOL/L (ref 136–145)

## 2023-06-14 PROCEDURE — 99232 PR SUBSEQUENT HOSPITAL CARE,LEVL II: ICD-10-PCS | Mod: ,,, | Performed by: HOSPITALIST

## 2023-06-14 PROCEDURE — 25000003 PHARM REV CODE 250: Performed by: NURSE PRACTITIONER

## 2023-06-14 PROCEDURE — 80069 RENAL FUNCTION PANEL: CPT | Performed by: HOSPITALIST

## 2023-06-14 PROCEDURE — 82962 GLUCOSE BLOOD TEST: CPT

## 2023-06-14 PROCEDURE — 63600175 PHARM REV CODE 636 W HCPCS: Performed by: NURSE PRACTITIONER

## 2023-06-14 PROCEDURE — 63600175 PHARM REV CODE 636 W HCPCS: Performed by: HOSPITALIST

## 2023-06-14 PROCEDURE — 96372 THER/PROPH/DIAG INJ SC/IM: CPT

## 2023-06-14 PROCEDURE — 11000001 HC ACUTE MED/SURG PRIVATE ROOM

## 2023-06-14 PROCEDURE — 25000003 PHARM REV CODE 250: Performed by: INTERNAL MEDICINE

## 2023-06-14 PROCEDURE — 99232 SBSQ HOSP IP/OBS MODERATE 35: CPT | Mod: ,,, | Performed by: HOSPITALIST

## 2023-06-14 PROCEDURE — 90935 HEMODIALYSIS ONE EVALUATION: CPT

## 2023-06-14 PROCEDURE — 25000003 PHARM REV CODE 250: Performed by: HOSPITALIST

## 2023-06-14 RX ADMIN — LACTULOSE 20 G: 20 SOLUTION ORAL at 08:06

## 2023-06-14 RX ADMIN — BUPROPION HYDROCHLORIDE 150 MG: 150 TABLET, FILM COATED, EXTENDED RELEASE ORAL at 09:06

## 2023-06-14 RX ADMIN — INSULIN ASPART 4 UNITS: 100 INJECTION, SOLUTION INTRAVENOUS; SUBCUTANEOUS at 04:06

## 2023-06-14 RX ADMIN — HEPARIN SODIUM 5000 UNITS: 5000 INJECTION INTRAVENOUS; SUBCUTANEOUS at 06:06

## 2023-06-14 RX ADMIN — CLONIDINE HYDROCHLORIDE 0.1 MG: 0.1 TABLET ORAL at 09:06

## 2023-06-14 RX ADMIN — CARVEDILOL 25 MG: 25 TABLET, FILM COATED ORAL at 04:06

## 2023-06-14 RX ADMIN — CLONIDINE HYDROCHLORIDE 0.1 MG: 0.1 TABLET ORAL at 02:06

## 2023-06-14 RX ADMIN — ONDANSETRON HYDROCHLORIDE 4 MG: 2 SOLUTION INTRAMUSCULAR; INTRAVENOUS at 09:06

## 2023-06-14 RX ADMIN — AMLODIPINE BESYLATE 10 MG: 10 TABLET ORAL at 08:06

## 2023-06-14 RX ADMIN — AMPICILLIN SODIUM 2 G: 2 INJECTION, POWDER, FOR SOLUTION INTRAMUSCULAR; INTRAVENOUS at 08:06

## 2023-06-14 RX ADMIN — INSULIN ASPART 4 UNITS: 100 INJECTION, SOLUTION INTRAVENOUS; SUBCUTANEOUS at 06:06

## 2023-06-14 RX ADMIN — HEPARIN SODIUM 5000 UNITS: 5000 INJECTION INTRAVENOUS; SUBCUTANEOUS at 09:06

## 2023-06-14 RX ADMIN — BUPROPION HYDROCHLORIDE 150 MG: 150 TABLET, FILM COATED, EXTENDED RELEASE ORAL at 08:06

## 2023-06-14 RX ADMIN — AMPICILLIN SODIUM 2 G: 2 INJECTION, POWDER, FOR SOLUTION INTRAMUSCULAR; INTRAVENOUS at 09:06

## 2023-06-14 RX ADMIN — SEVELAMER CARBONATE 800 MG: 800 TABLET, FILM COATED ORAL at 06:06

## 2023-06-14 RX ADMIN — PANTOPRAZOLE SODIUM 40 MG: 40 TABLET, DELAYED RELEASE ORAL at 08:06

## 2023-06-14 RX ADMIN — SODIUM CHLORIDE 2000 ML: 9 INJECTION, SOLUTION INTRAVENOUS at 11:06

## 2023-06-14 RX ADMIN — HEPARIN SODIUM 5000 UNITS: 5000 INJECTION INTRAVENOUS; SUBCUTANEOUS at 02:06

## 2023-06-14 RX ADMIN — INSULIN DETEMIR 12 UNITS: 100 INJECTION, SOLUTION SUBCUTANEOUS at 09:06

## 2023-06-14 RX ADMIN — LOSARTAN POTASSIUM 100 MG: 100 TABLET, FILM COATED ORAL at 09:06

## 2023-06-14 RX ADMIN — DOCUSATE SODIUM 100 MG: 100 CAPSULE, LIQUID FILLED ORAL at 08:06

## 2023-06-14 RX ADMIN — SEVELAMER CARBONATE 800 MG: 800 TABLET, FILM COATED ORAL at 04:06

## 2023-06-14 RX ADMIN — ISOSORBIDE MONONITRATE 60 MG: 30 TABLET, EXTENDED RELEASE ORAL at 08:06

## 2023-06-14 RX ADMIN — CLONIDINE HYDROCHLORIDE 0.1 MG: 0.1 TABLET ORAL at 08:06

## 2023-06-14 RX ADMIN — CARVEDILOL 25 MG: 25 TABLET, FILM COATED ORAL at 08:06

## 2023-06-14 RX ADMIN — FERROUS SULFATE TAB 325 MG (65 MG ELEMENTAL FE) 1 EACH: 325 (65 FE) TAB at 08:06

## 2023-06-14 RX ADMIN — LACTULOSE 20 G: 20 SOLUTION ORAL at 02:06
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
## 2023-06-14 NOTE — DIALYSIS ROUNDING
The patient was dialyzed today.There was a net fluid removal of 3 liters during the treatment.    PE  Lungs-clear  Cor-no murmur   Abd-soft    Plan:  Continue the present care

## 2023-06-14 NOTE — SUBJECTIVE & OBJECTIVE
Interval History:     Review of Systems   Constitutional:  Negative for appetite change, fatigue and fever.   HENT:  Negative for congestion, hearing loss and trouble swallowing.    Respiratory:  Negative for chest tightness, shortness of breath and wheezing.    Cardiovascular:  Negative for chest pain and palpitations.   Gastrointestinal:  Positive for constipation. Negative for abdominal pain and nausea.   Genitourinary:  Positive for difficulty urinating. Negative for dysuria.   Musculoskeletal:  Positive for gait problem. Negative for back pain and neck stiffness.   Skin:  Positive for wound. Negative for pallor and rash.   Neurological:  Positive for weakness. Negative for dizziness, speech difficulty and headaches.   Psychiatric/Behavioral:  Negative for confusion and suicidal ideas.    Objective:     Vital Signs (Most Recent):  Temp: 97.7 °F (36.5 °C) (06/14/23 0824)  Pulse: 71 (06/14/23 1030)  Resp: 17 (06/14/23 0824)  BP: (!) 173/93 (06/14/23 1030)  SpO2: 97 % (06/14/23 0740) Vital Signs (24h Range):  Temp:  [97.6 °F (36.4 °C)-98.8 °F (37.1 °C)] 97.7 °F (36.5 °C)  Pulse:  [65-92] 71  Resp:  [17-20] 17  SpO2:  [97 %-100 %] 97 %  BP: (151-177)/(52-96) 173/93     Weight: 76.4 kg (168 lb 8 oz)  Body mass index is 29.85 kg/m².    Intake/Output Summary (Last 24 hours) at 6/14/2023 1103  Last data filed at 6/14/2023 0904  Gross per 24 hour   Intake 100 ml   Output --   Net 100 ml         Physical Exam  Vitals reviewed.   Constitutional:       General: He is awake. He is not in acute distress.     Appearance: He is well-developed. He is not toxic-appearing.   HENT:      Head: Normocephalic.      Nose: Nose normal.      Mouth/Throat:      Pharynx: Oropharynx is clear.   Eyes:      Extraocular Movements: Extraocular movements intact.      Pupils: Pupils are equal, round, and reactive to light.   Neck:      Thyroid: No thyroid mass.      Vascular: No carotid bruit.   Cardiovascular:      Rate and Rhythm: Normal rate  and regular rhythm.      Pulses: Normal pulses.      Heart sounds: Normal heart sounds. No murmur heard.  Pulmonary:      Effort: Pulmonary effort is normal.      Breath sounds: Normal breath sounds and air entry. No wheezing.   Abdominal:      General: Bowel sounds are normal. There is no distension.      Palpations: Abdomen is soft.      Tenderness: There is no abdominal tenderness.   Musculoskeletal:         General: Signs of injury present. Normal range of motion.      Cervical back: Neck supple. No rigidity.      Comments: Wounds to 1st and 2nd digit left foot that are currently dressed   Skin:     General: Skin is warm.      Coloration: Skin is not jaundiced.      Findings: No lesion.   Neurological:      Mental Status: He is alert and oriented to person, place, and time.      Cranial Nerves: No cranial nerve deficit.      Motor: Weakness present.      Gait: Gait abnormal.      Comments:  1 over 5  weakness to bilateral lower extremities   Psychiatric:         Attention and Perception: Attention normal.         Mood and Affect: Mood normal.         Behavior: Behavior normal. Behavior is cooperative.         Thought Content: Thought content normal.         Cognition and Memory: Cognition normal.           Significant Labs: All pertinent labs within the past 24 hours have been reviewed.    Significant Imaging: I have reviewed all pertinent imaging results/findings within the past 24 hours.
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
## 2023-06-15 LAB
ALBUMIN SERPL BCP-MCNC: 2.4 G/DL (ref 3.5–5)
ANION GAP SERPL CALCULATED.3IONS-SCNC: 14 MMOL/L (ref 7–16)
BUN SERPL-MCNC: 69 MG/DL (ref 7–18)
BUN/CREAT SERPL: 14 (ref 6–20)
CALCIUM SERPL-MCNC: 8.8 MG/DL (ref 8.5–10.1)
CHLORIDE SERPL-SCNC: 99 MMOL/L (ref 98–107)
CO2 SERPL-SCNC: 29 MMOL/L (ref 21–32)
CREAT SERPL-MCNC: 5.05 MG/DL (ref 0.7–1.3)
EGFR (NO RACE VARIABLE) (RUSH/TITUS): 14 ML/MIN/1.73M2
GLUCOSE SERPL-MCNC: 109 MG/DL (ref 70–105)
GLUCOSE SERPL-MCNC: 112 MG/DL (ref 74–106)
GLUCOSE SERPL-MCNC: 132 MG/DL (ref 70–105)
GLUCOSE SERPL-MCNC: 182 MG/DL (ref 70–105)
GLUCOSE SERPL-MCNC: 61 MG/DL (ref 70–105)
GLUCOSE SERPL-MCNC: 87 MG/DL (ref 70–105)
PHOSPHATE SERPL-MCNC: 4.5 MG/DL (ref 2.5–4.5)
POTASSIUM SERPL-SCNC: 4.6 MMOL/L (ref 3.5–5.1)
SODIUM SERPL-SCNC: 137 MMOL/L (ref 136–145)

## 2023-06-15 PROCEDURE — 63600175 PHARM REV CODE 636 W HCPCS: Performed by: HOSPITALIST

## 2023-06-15 PROCEDURE — 11000001 HC ACUTE MED/SURG PRIVATE ROOM

## 2023-06-15 PROCEDURE — 82962 GLUCOSE BLOOD TEST: CPT

## 2023-06-15 PROCEDURE — 99233 PR SUBSEQUENT HOSPITAL CARE,LEVL III: ICD-10-PCS | Mod: ,,, | Performed by: HOSPITALIST

## 2023-06-15 PROCEDURE — 63600175 PHARM REV CODE 636 W HCPCS: Performed by: NURSE PRACTITIONER

## 2023-06-15 PROCEDURE — 96372 THER/PROPH/DIAG INJ SC/IM: CPT

## 2023-06-15 PROCEDURE — 25000003 PHARM REV CODE 250: Performed by: HOSPITALIST

## 2023-06-15 PROCEDURE — 99233 SBSQ HOSP IP/OBS HIGH 50: CPT | Mod: ,,, | Performed by: HOSPITALIST

## 2023-06-15 PROCEDURE — 80069 RENAL FUNCTION PANEL: CPT | Performed by: HOSPITALIST

## 2023-06-15 PROCEDURE — 25000003 PHARM REV CODE 250: Performed by: NURSE PRACTITIONER

## 2023-06-15 RX ADMIN — CLONIDINE HYDROCHLORIDE 0.1 MG: 0.1 TABLET ORAL at 08:06

## 2023-06-15 RX ADMIN — HEPARIN SODIUM 5000 UNITS: 5000 INJECTION INTRAVENOUS; SUBCUTANEOUS at 02:06

## 2023-06-15 RX ADMIN — FERROUS SULFATE TAB 325 MG (65 MG ELEMENTAL FE) 1 EACH: 325 (65 FE) TAB at 08:06

## 2023-06-15 RX ADMIN — BUPROPION HYDROCHLORIDE 150 MG: 150 TABLET, FILM COATED, EXTENDED RELEASE ORAL at 08:06

## 2023-06-15 RX ADMIN — LACTULOSE 20 G: 20 SOLUTION ORAL at 08:06

## 2023-06-15 RX ADMIN — LACTULOSE 20 G: 20 SOLUTION ORAL at 02:06

## 2023-06-15 RX ADMIN — CLONIDINE HYDROCHLORIDE 0.1 MG: 0.1 TABLET ORAL at 02:06

## 2023-06-15 RX ADMIN — HEPARIN SODIUM 5000 UNITS: 5000 INJECTION INTRAVENOUS; SUBCUTANEOUS at 09:06

## 2023-06-15 RX ADMIN — SEVELAMER CARBONATE 800 MG: 800 TABLET, FILM COATED ORAL at 05:06

## 2023-06-15 RX ADMIN — INSULIN ASPART 4 UNITS: 100 INJECTION, SOLUTION INTRAVENOUS; SUBCUTANEOUS at 05:06

## 2023-06-15 RX ADMIN — SEVELAMER CARBONATE 800 MG: 800 TABLET, FILM COATED ORAL at 12:06

## 2023-06-15 RX ADMIN — INSULIN ASPART 4 UNITS: 100 INJECTION, SOLUTION INTRAVENOUS; SUBCUTANEOUS at 12:06

## 2023-06-15 RX ADMIN — PANTOPRAZOLE SODIUM 40 MG: 40 TABLET, DELAYED RELEASE ORAL at 08:06

## 2023-06-15 RX ADMIN — CLONIDINE HYDROCHLORIDE 0.1 MG: 0.1 TABLET ORAL at 09:06

## 2023-06-15 RX ADMIN — AMLODIPINE BESYLATE 10 MG: 10 TABLET ORAL at 08:06

## 2023-06-15 RX ADMIN — HEPARIN SODIUM 5000 UNITS: 5000 INJECTION INTRAVENOUS; SUBCUTANEOUS at 06:06

## 2023-06-15 RX ADMIN — CARVEDILOL 25 MG: 25 TABLET, FILM COATED ORAL at 08:06

## 2023-06-15 RX ADMIN — ISOSORBIDE MONONITRATE 60 MG: 30 TABLET, EXTENDED RELEASE ORAL at 08:06

## 2023-06-15 RX ADMIN — CARVEDILOL 25 MG: 25 TABLET, FILM COATED ORAL at 05:06

## 2023-06-15 RX ADMIN — SEVELAMER CARBONATE 800 MG: 800 TABLET, FILM COATED ORAL at 06:06

## 2023-06-15 RX ADMIN — DOCUSATE SODIUM 100 MG: 100 CAPSULE, LIQUID FILLED ORAL at 08:06

## 2023-06-15 RX ADMIN — LOSARTAN POTASSIUM 100 MG: 100 TABLET, FILM COATED ORAL at 09:06

## 2023-06-15 RX ADMIN — AMPICILLIN SODIUM 2 G: 2 INJECTION, POWDER, FOR SOLUTION INTRAMUSCULAR; INTRAVENOUS at 10:06

## 2023-06-15 RX ADMIN — BUPROPION HYDROCHLORIDE 150 MG: 150 TABLET, FILM COATED, EXTENDED RELEASE ORAL at 09:06

## 2023-06-15 NOTE — PLAN OF CARE
Chart reviewed and discussed with Dr. Arizmendi. Patient will receive IV abx through 7/6/23. Continue plan of care. Discharge plan is to return to [facility] at discharge. SS following.

## 2023-06-15 NOTE — SUBJECTIVE & OBJECTIVE
Interval History:     Review of Systems   Constitutional:  Negative for appetite change, fatigue and fever.   HENT:  Negative for congestion, hearing loss and trouble swallowing.    Respiratory:  Negative for chest tightness, shortness of breath and wheezing.    Cardiovascular:  Negative for chest pain and palpitations.   Gastrointestinal:  Positive for constipation. Negative for abdominal pain and nausea.   Genitourinary:  Positive for difficulty urinating. Negative for dysuria.   Musculoskeletal:  Positive for gait problem. Negative for back pain and neck stiffness.   Skin:  Positive for wound. Negative for pallor and rash.   Neurological:  Positive for weakness. Negative for dizziness, speech difficulty and headaches.   Psychiatric/Behavioral:  Negative for confusion and suicidal ideas.    Objective:     Vital Signs (Most Recent):  Temp: 98.4 °F (36.9 °C) (06/15/23 1137)  Pulse: 70 (06/15/23 1137)  Resp: 18 (06/15/23 1137)  BP: (!) 182/57 (06/15/23 1137)  SpO2: 98 % (06/15/23 1137) Vital Signs (24h Range):  Temp:  [98.4 °F (36.9 °C)-99.2 °F (37.3 °C)] 98.4 °F (36.9 °C)  Pulse:  [67-80] 70  Resp:  [18-20] 18  SpO2:  [98 %-100 %] 98 %  BP: (176-189)/(41-77) 182/57     Weight: 70.5 kg (155 lb 6.8 oz)  Body mass index is 27.53 kg/m².    Intake/Output Summary (Last 24 hours) at 6/15/2023 1633  Last data filed at 6/15/2023 0830  Gross per 24 hour   Intake 580 ml   Output --   Net 580 ml         Physical Exam  Vitals reviewed.   Constitutional:       General: He is awake. He is not in acute distress.     Appearance: He is well-developed. He is not toxic-appearing.   HENT:      Head: Normocephalic.      Nose: Nose normal.      Mouth/Throat:      Pharynx: Oropharynx is clear.   Eyes:      Extraocular Movements: Extraocular movements intact.      Pupils: Pupils are equal, round, and reactive to light.   Neck:      Thyroid: No thyroid mass.      Vascular: No carotid bruit.   Cardiovascular:      Rate and Rhythm: Normal rate  and regular rhythm.      Pulses: Normal pulses.      Heart sounds: Normal heart sounds. No murmur heard.  Pulmonary:      Effort: Pulmonary effort is normal.      Breath sounds: Normal breath sounds and air entry. No wheezing.   Abdominal:      General: Bowel sounds are normal. There is no distension.      Palpations: Abdomen is soft.      Tenderness: There is no abdominal tenderness.   Musculoskeletal:         General: Signs of injury present. Normal range of motion.      Cervical back: Neck supple. No rigidity.      Comments: Wounds to 1st and 2nd digit left foot that are currently dressed   Skin:     General: Skin is warm.      Coloration: Skin is not jaundiced.      Findings: No lesion.   Neurological:      Mental Status: He is alert and oriented to person, place, and time.      Cranial Nerves: No cranial nerve deficit.      Motor: Weakness present.      Gait: Gait abnormal.      Comments:  1 over 5  weakness to bilateral lower extremities   Psychiatric:         Attention and Perception: Attention normal.         Mood and Affect: Mood normal.         Behavior: Behavior normal. Behavior is cooperative.         Thought Content: Thought content normal.         Cognition and Memory: Cognition normal.           Significant Labs: All pertinent labs within the past 24 hours have been reviewed.    Significant Imaging: I have reviewed all pertinent imaging results/findings within the past 24 hours.
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
## 2023-06-15 NOTE — PROGRESS NOTES
[facility]  Nephrology  Progress Note    Patient Name: Lee Escobar  MRN: [id number]  Admission Date: 5/26/2023  Hospital Length of Stay: 20 days  Attending Provider: Mimi Arizmendi MD   Primary Care Physician: Maria Elena Solorio II, MD  Principal Problem:Osteomyelitis of left foot    Consults  Subjective:     Interval History: The patient has no complaints today.    Review of patient's allergies indicates:  No Known Allergies  Current Facility-Administered Medications   Medication Frequency    0.9%  NaCl infusion PRN    acetaminophen tablet 650 mg Q4H PRN    ALPRAZolam tablet 0.5 mg TID PRN    amLODIPine tablet 10 mg Daily    ampicillin (OMNIPEN) 2 g in sodium chloride 0.9 % 100 mL IVPB (MB+) Q12H    buPROPion TBSR 12 hr tablet 150 mg BID    carvediloL tablet 25 mg BID WM    cloNIDine tablet 0.1 mg TID    dextrose 50% injection 12.5 g PRN    dextrose 50% injection 25 g PRN    docusate sodium capsule 100 mg BID    ferrous sulfate tablet 1 each Daily    glucagon (human recombinant) injection 1 mg PRN    glucagon (human recombinant) injection 1 mg PRN    glucose chewable tablet 16 g PRN    glucose chewable tablet 24 g PRN    heparin (porcine) injection 5,000 Units Q8H    hydrALAZINE tablet 25 mg Q8H PRN    insulin aspart U-100 injection 0-5 Units QID (AC + HS) PRN    insulin aspart U-100 injection 4 Units TIDWM    insulin detemir U-100 injection 12 Units QHS    isosorbide mononitrate 24 hr tablet 60 mg Daily    labetaloL injection 10 mg Q4H PRN    lactulose 20 gram/30 mL solution Soln 20 g TID    linaCLOtide capsule 145 mcg Daily PRN    losartan tablet 100 mg QHS    melatonin tablet 6 mg Nightly PRN    naloxone 0.4 mg/mL injection 0.02 mg PRN    ondansetron injection 4 mg Q8H PRN    pantoprazole EC tablet 40 mg Daily    sevelamer carbonate tablet 800 mg TIDWM    silver sulfADIAZINE 1% cream Daily PRN    sodium chloride 0.9% flush 10 mL Q12H PRN       Objective:     Vital Signs (Most  Recent):  Temp: 98.4 °F (36.9 °C) (06/15/23 1600)  Pulse: 70 (06/15/23 1600)  Resp: 18 (06/15/23 1600)  BP: (!) 148/47 (06/15/23 1600)  SpO2: 98 % (06/15/23 1600) Vital Signs (24h Range):  Temp:  [98.4 °F (36.9 °C)-99.2 °F (37.3 °C)] 98.4 °F (36.9 °C)  Pulse:  [67-80] 70  Resp:  [18-20] 18  SpO2:  [98 %-100 %] 98 %  BP: (148-189)/(41-67) 148/47     Weight: 70.5 kg (155 lb 6.8 oz) (06/15/23 0400)  Body mass index is 27.53 kg/m².  Body surface area is 1.77 meters squared.    I/O last 3 completed shifts:  In: 940 [P.O.:240; Other:500; IV Piggyback:200]  Out: 3500 [Other:3500]    Physical Exam  Lungs-clear  Cor-no murmur or rub  Abd-soft    Significant Labs:sureCBC:   Recent Labs   Lab 06/10/23  0456   WBC 7.68   RBC 3.14*   HGB 8.7*   HCT 28.4*      MCV 90.4   MCH 27.7   MCHC 30.6*     CMP:   Recent Labs   Lab 06/10/23  0456 06/12/23  0537 06/15/23  0258   GLU 45*   < > 112*   CALCIUM 8.6   < > 8.8   ALBUMIN 2.6*   < > 2.4*   PROT 6.5  --   --       < > 137   K 4.6   < > 4.6   CO2 27   < > 29      < > 99   BUN 43*   < > 69*   CREATININE 4.41*   < > 5.05*   ALKPHOS 104  --   --    ALT 21  --   --    AST 24  --   --    BILITOT 0.3  --   --     < > = values in this interval not displayed.     All labs within the past 24 hours have been reviewed.    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]  Not Applicable     Chronic    Diabetic ulcer of toe of left foot associated with type 2 diabetes mellitus, with bone involvement without evidence of necrosis [E11.621, L97.526] 05/31/2023 Yes    Pressure injury of left heel, unstageable [L89.620] 05/31/2023 Yes    Pressure injury of right buttock, stage 2 [L89.312] 05/31/2023 Yes    HTN (hypertension) [I10] 05/25/2023 Yes     Chronic     Type 2 diabetes mellitus with kidney complication, with long-term current use of insulin [E11.29, Z79.4] 05/25/2023 Not Applicable     Chronic    Constipation [K59.00] 05/25/2023 Yes     Chronic    ESRD (end stage renal disease) [N18.6] 05/25/2023 Yes     Chronic    Wound infection [T14.8XXA, L08.9] 05/25/2023 Yes    Major depression [F32.9] 05/25/2023 Yes      Problems Resolved During this Admission:       Plan:  Hemodialysis tomorrow    Thank you for your consult. I will follow-up with patient. Please contact us if you have any additional questions.    Danny Peter MD  Nephrology  [facility]

## 2023-06-16 PROBLEM — R10.9 RIGHT FLANK PAIN: Status: ACTIVE | Noted: 2023-06-16

## 2023-06-16 LAB
ALBUMIN SERPL BCP-MCNC: 2.4 G/DL (ref 3.5–5)
ANION GAP SERPL CALCULATED.3IONS-SCNC: 18 MMOL/L (ref 7–16)
BUN SERPL-MCNC: 83 MG/DL (ref 7–18)
BUN/CREAT SERPL: 13 (ref 6–20)
CALCIUM SERPL-MCNC: 9 MG/DL (ref 8.5–10.1)
CHLORIDE SERPL-SCNC: 97 MMOL/L (ref 98–107)
CO2 SERPL-SCNC: 25 MMOL/L (ref 21–32)
CREAT SERPL-MCNC: 6.5 MG/DL (ref 0.7–1.3)
EGFR (NO RACE VARIABLE) (RUSH/TITUS): 11 ML/MIN/1.73M2
GLUCOSE SERPL-MCNC: 129 MG/DL (ref 70–105)
GLUCOSE SERPL-MCNC: 153 MG/DL (ref 70–105)
GLUCOSE SERPL-MCNC: 175 MG/DL (ref 74–106)
GLUCOSE SERPL-MCNC: 203 MG/DL (ref 70–105)
GLUCOSE SERPL-MCNC: 67 MG/DL (ref 70–105)
GLUCOSE SERPL-MCNC: 94 MG/DL (ref 70–105)
PHOSPHATE SERPL-MCNC: 5.1 MG/DL (ref 2.5–4.5)
POTASSIUM SERPL-SCNC: 4.9 MMOL/L (ref 3.5–5.1)
SODIUM SERPL-SCNC: 135 MMOL/L (ref 136–145)

## 2023-06-16 PROCEDURE — 80069 RENAL FUNCTION PANEL: CPT | Performed by: HOSPITALIST

## 2023-06-16 PROCEDURE — 25000003 PHARM REV CODE 250: Performed by: NURSE PRACTITIONER

## 2023-06-16 PROCEDURE — 82962 GLUCOSE BLOOD TEST: CPT

## 2023-06-16 PROCEDURE — 63600175 PHARM REV CODE 636 W HCPCS: Performed by: HOSPITALIST

## 2023-06-16 PROCEDURE — 90935 HEMODIALYSIS ONE EVALUATION: CPT

## 2023-06-16 PROCEDURE — 99233 SBSQ HOSP IP/OBS HIGH 50: CPT | Mod: ,,, | Performed by: HOSPITALIST

## 2023-06-16 PROCEDURE — 25000003 PHARM REV CODE 250: Performed by: INTERNAL MEDICINE

## 2023-06-16 PROCEDURE — C1752 CATH,HEMODIALYSIS,SHORT-TERM: HCPCS

## 2023-06-16 PROCEDURE — 25000003 PHARM REV CODE 250: Performed by: HOSPITALIST

## 2023-06-16 PROCEDURE — 63600175 PHARM REV CODE 636 W HCPCS: Performed by: NURSE PRACTITIONER

## 2023-06-16 PROCEDURE — 11000001 HC ACUTE MED/SURG PRIVATE ROOM

## 2023-06-16 PROCEDURE — 99233 PR SUBSEQUENT HOSPITAL CARE,LEVL III: ICD-10-PCS | Mod: ,,, | Performed by: HOSPITALIST

## 2023-06-16 PROCEDURE — 63600175 PHARM REV CODE 636 W HCPCS: Performed by: FAMILY MEDICINE

## 2023-06-16 RX ADMIN — DOCUSATE SODIUM 100 MG: 100 CAPSULE, LIQUID FILLED ORAL at 09:06

## 2023-06-16 RX ADMIN — HEPARIN SODIUM 5000 UNITS: 5000 INJECTION INTRAVENOUS; SUBCUTANEOUS at 02:06

## 2023-06-16 RX ADMIN — SEVELAMER CARBONATE 800 MG: 800 TABLET, FILM COATED ORAL at 06:06

## 2023-06-16 RX ADMIN — ISOSORBIDE MONONITRATE 60 MG: 30 TABLET, EXTENDED RELEASE ORAL at 09:06

## 2023-06-16 RX ADMIN — AMPICILLIN SODIUM 2 G: 2 INJECTION, POWDER, FOR SOLUTION INTRAMUSCULAR; INTRAVENOUS at 04:06

## 2023-06-16 RX ADMIN — SILVER SULFADIAZINE: 10 CREAM TOPICAL at 03:06

## 2023-06-16 RX ADMIN — PANTOPRAZOLE SODIUM 40 MG: 40 TABLET, DELAYED RELEASE ORAL at 09:06

## 2023-06-16 RX ADMIN — CLONIDINE HYDROCHLORIDE 0.1 MG: 0.1 TABLET ORAL at 09:06

## 2023-06-16 RX ADMIN — LOSARTAN POTASSIUM 100 MG: 100 TABLET, FILM COATED ORAL at 09:06

## 2023-06-16 RX ADMIN — LACTULOSE 20 G: 20 SOLUTION ORAL at 09:06

## 2023-06-16 RX ADMIN — FERROUS SULFATE TAB 325 MG (65 MG ELEMENTAL FE) 1 EACH: 325 (65 FE) TAB at 09:06

## 2023-06-16 RX ADMIN — SODIUM CHLORIDE 2000 ML: 9 INJECTION, SOLUTION INTRAVENOUS at 10:06

## 2023-06-16 RX ADMIN — SEVELAMER CARBONATE 800 MG: 800 TABLET, FILM COATED ORAL at 11:06

## 2023-06-16 RX ADMIN — BUPROPION HYDROCHLORIDE 150 MG: 150 TABLET, FILM COATED, EXTENDED RELEASE ORAL at 09:06

## 2023-06-16 RX ADMIN — CARVEDILOL 25 MG: 25 TABLET, FILM COATED ORAL at 04:06

## 2023-06-16 RX ADMIN — CLONIDINE HYDROCHLORIDE 0.1 MG: 0.1 TABLET ORAL at 02:06

## 2023-06-16 RX ADMIN — AMLODIPINE BESYLATE 10 MG: 10 TABLET ORAL at 09:06

## 2023-06-16 RX ADMIN — HEPARIN SODIUM 5000 UNITS: 5000 INJECTION INTRAVENOUS; SUBCUTANEOUS at 05:06

## 2023-06-16 RX ADMIN — SEVELAMER CARBONATE 800 MG: 800 TABLET, FILM COATED ORAL at 04:06

## 2023-06-16 RX ADMIN — HEPARIN SODIUM 5000 UNITS: 5000 INJECTION INTRAVENOUS; SUBCUTANEOUS at 09:06

## 2023-06-16 RX ADMIN — ONDANSETRON HYDROCHLORIDE 4 MG: 2 SOLUTION INTRAMUSCULAR; INTRAVENOUS at 04:06

## 2023-06-16 RX ADMIN — INSULIN ASPART 4 UNITS: 100 INJECTION, SOLUTION INTRAVENOUS; SUBCUTANEOUS at 11:06

## 2023-06-16 RX ADMIN — INSULIN ASPART 2 UNITS: 100 INJECTION, SOLUTION INTRAVENOUS; SUBCUTANEOUS at 06:06

## 2023-06-16 RX ADMIN — INSULIN DETEMIR 12 UNITS: 100 INJECTION, SOLUTION SUBCUTANEOUS at 09:06

## 2023-06-16 RX ADMIN — INSULIN ASPART 4 UNITS: 100 INJECTION, SOLUTION INTRAVENOUS; SUBCUTANEOUS at 06:06

## 2023-06-16 RX ADMIN — LACTULOSE 20 G: 20 SOLUTION ORAL at 02:06

## 2023-06-16 RX ADMIN — CARVEDILOL 25 MG: 25 TABLET, FILM COATED ORAL at 07:06

## 2023-06-16 NOTE — SUBJECTIVE & OBJECTIVE
Interval History:     Review of Systems   Constitutional:  Negative for appetite change, fatigue and fever.   HENT:  Negative for congestion, hearing loss and trouble swallowing.    Respiratory:  Negative for chest tightness, shortness of breath and wheezing.    Cardiovascular:  Negative for chest pain and palpitations.   Gastrointestinal:  Positive for constipation. Negative for abdominal pain and nausea.   Genitourinary:  Positive for difficulty urinating. Negative for dysuria.   Musculoskeletal:  Positive for gait problem. Negative for back pain and neck stiffness.   Skin:  Positive for wound. Negative for pallor and rash.   Neurological:  Positive for weakness. Negative for dizziness, speech difficulty and headaches.   Psychiatric/Behavioral:  Negative for confusion and suicidal ideas.    Objective:     Vital Signs (Most Recent):  Temp: 97.8 °F (36.6 °C) (06/16/23 0400)  Pulse: 74 (06/16/23 0400)  Resp: 18 (06/16/23 0400)  BP: (!) 154/46 (06/16/23 0737)  SpO2: 100 % (06/16/23 0400) Vital Signs (24h Range):  Temp:  [97.8 °F (36.6 °C)-99 °F (37.2 °C)] 97.8 °F (36.6 °C)  Pulse:  [70-90] 74  Resp:  [18-20] 18  SpO2:  [98 %-100 %] 100 %  BP: (148-189)/(46-70) 154/46     Weight: 70.5 kg (155 lb 6.8 oz)  Body mass index is 27.53 kg/m².    Intake/Output Summary (Last 24 hours) at 6/16/2023 0752  Last data filed at 6/16/2023 0520  Gross per 24 hour   Intake 800 ml   Output --   Net 800 ml         Physical Exam  Vitals reviewed.   Constitutional:       General: He is awake. He is not in acute distress.     Appearance: He is well-developed. He is not toxic-appearing.   HENT:      Head: Normocephalic.      Nose: Nose normal.      Mouth/Throat:      Pharynx: Oropharynx is clear.   Eyes:      Extraocular Movements: Extraocular movements intact.      Pupils: Pupils are equal, round, and reactive to light.   Neck:      Thyroid: No thyroid mass.      Vascular: No carotid bruit.   Cardiovascular:      Rate and Rhythm: Normal rate  and regular rhythm.      Pulses: Normal pulses.      Heart sounds: Normal heart sounds. No murmur heard.  Pulmonary:      Effort: Pulmonary effort is normal.      Breath sounds: Normal breath sounds and air entry. No wheezing.   Abdominal:      General: Bowel sounds are normal. There is no distension.      Palpations: Abdomen is soft.      Tenderness: There is no abdominal tenderness.   Musculoskeletal:         General: Signs of injury present. Normal range of motion.      Cervical back: Neck supple. No rigidity.      Comments: Wounds to 1st and 2nd digit left foot that are currently dressed   Skin:     General: Skin is warm.      Coloration: Skin is not jaundiced.      Findings: No lesion.   Neurological:      Mental Status: He is alert and oriented to person, place, and time.      Cranial Nerves: No cranial nerve deficit.      Motor: Weakness present.      Gait: Gait abnormal.      Comments:  1 over 5  weakness to bilateral lower extremities   Psychiatric:         Attention and Perception: Attention normal.         Mood and Affect: Mood normal.         Behavior: Behavior normal. Behavior is cooperative.         Thought Content: Thought content normal.         Cognition and Memory: Cognition normal.           Significant Labs: All pertinent labs within the past 24 hours have been reviewed.    Significant Imaging: I have reviewed all pertinent imaging results/findings within the past 24 hours.
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
## 2023-06-17 LAB
ALBUMIN SERPL BCP-MCNC: 2.4 G/DL (ref 3.5–5)
ANION GAP SERPL CALCULATED.3IONS-SCNC: 15 MMOL/L (ref 7–16)
BUN SERPL-MCNC: 61 MG/DL (ref 7–18)
BUN/CREAT SERPL: 13 (ref 6–20)
CALCIUM SERPL-MCNC: 8.7 MG/DL (ref 8.5–10.1)
CHLORIDE SERPL-SCNC: 99 MMOL/L (ref 98–107)
CO2 SERPL-SCNC: 29 MMOL/L (ref 21–32)
CREAT SERPL-MCNC: 4.83 MG/DL (ref 0.7–1.3)
EGFR (NO RACE VARIABLE) (RUSH/TITUS): 15 ML/MIN/1.73M2
GLUCOSE SERPL-MCNC: 146 MG/DL (ref 70–105)
GLUCOSE SERPL-MCNC: 165 MG/DL (ref 74–106)
GLUCOSE SERPL-MCNC: 172 MG/DL (ref 70–105)
GLUCOSE SERPL-MCNC: 183 MG/DL (ref 70–105)
GLUCOSE SERPL-MCNC: 86 MG/DL (ref 70–105)
PHOSPHATE SERPL-MCNC: 4.1 MG/DL (ref 2.5–4.5)
POTASSIUM SERPL-SCNC: 4.5 MMOL/L (ref 3.5–5.1)
SODIUM SERPL-SCNC: 138 MMOL/L (ref 136–145)

## 2023-06-17 PROCEDURE — 25000003 PHARM REV CODE 250: Performed by: HOSPITALIST

## 2023-06-17 PROCEDURE — 99232 SBSQ HOSP IP/OBS MODERATE 35: CPT | Mod: ,,, | Performed by: FAMILY MEDICINE

## 2023-06-17 PROCEDURE — 25000003 PHARM REV CODE 250: Performed by: NURSE PRACTITIONER

## 2023-06-17 PROCEDURE — 80069 RENAL FUNCTION PANEL: CPT | Performed by: HOSPITALIST

## 2023-06-17 PROCEDURE — 63600175 PHARM REV CODE 636 W HCPCS: Performed by: NURSE PRACTITIONER

## 2023-06-17 PROCEDURE — 11000001 HC ACUTE MED/SURG PRIVATE ROOM

## 2023-06-17 PROCEDURE — 63600175 PHARM REV CODE 636 W HCPCS: Performed by: HOSPITALIST

## 2023-06-17 PROCEDURE — 82962 GLUCOSE BLOOD TEST: CPT

## 2023-06-17 PROCEDURE — 99232 PR SUBSEQUENT HOSPITAL CARE,LEVL II: ICD-10-PCS | Mod: ,,, | Performed by: FAMILY MEDICINE

## 2023-06-17 RX ADMIN — AMPICILLIN SODIUM 2 G: 2 INJECTION, POWDER, FOR SOLUTION INTRAMUSCULAR; INTRAVENOUS at 03:06

## 2023-06-17 RX ADMIN — CLONIDINE HYDROCHLORIDE 0.1 MG: 0.1 TABLET ORAL at 08:06

## 2023-06-17 RX ADMIN — PANTOPRAZOLE SODIUM 40 MG: 40 TABLET, DELAYED RELEASE ORAL at 08:06

## 2023-06-17 RX ADMIN — HEPARIN SODIUM 5000 UNITS: 5000 INJECTION INTRAVENOUS; SUBCUTANEOUS at 09:06

## 2023-06-17 RX ADMIN — HEPARIN SODIUM 5000 UNITS: 5000 INJECTION INTRAVENOUS; SUBCUTANEOUS at 02:06

## 2023-06-17 RX ADMIN — CLONIDINE HYDROCHLORIDE 0.1 MG: 0.1 TABLET ORAL at 02:06

## 2023-06-17 RX ADMIN — SEVELAMER CARBONATE 800 MG: 800 TABLET, FILM COATED ORAL at 05:06

## 2023-06-17 RX ADMIN — DOCUSATE SODIUM 100 MG: 100 CAPSULE, LIQUID FILLED ORAL at 09:06

## 2023-06-17 RX ADMIN — AMLODIPINE BESYLATE 10 MG: 10 TABLET ORAL at 08:06

## 2023-06-17 RX ADMIN — LOSARTAN POTASSIUM 100 MG: 100 TABLET, FILM COATED ORAL at 09:06

## 2023-06-17 RX ADMIN — ISOSORBIDE MONONITRATE 60 MG: 30 TABLET, EXTENDED RELEASE ORAL at 08:06

## 2023-06-17 RX ADMIN — BUPROPION HYDROCHLORIDE 150 MG: 150 TABLET, FILM COATED, EXTENDED RELEASE ORAL at 09:06

## 2023-06-17 RX ADMIN — AMPICILLIN SODIUM 2 G: 2 INJECTION, POWDER, FOR SOLUTION INTRAMUSCULAR; INTRAVENOUS at 05:06

## 2023-06-17 RX ADMIN — INSULIN DETEMIR 12 UNITS: 100 INJECTION, SOLUTION SUBCUTANEOUS at 09:06

## 2023-06-17 RX ADMIN — BUPROPION HYDROCHLORIDE 150 MG: 150 TABLET, FILM COATED, EXTENDED RELEASE ORAL at 08:06

## 2023-06-17 RX ADMIN — HEPARIN SODIUM 5000 UNITS: 5000 INJECTION INTRAVENOUS; SUBCUTANEOUS at 06:06

## 2023-06-17 RX ADMIN — CARVEDILOL 25 MG: 25 TABLET, FILM COATED ORAL at 08:06

## 2023-06-17 RX ADMIN — CLONIDINE HYDROCHLORIDE 0.1 MG: 0.1 TABLET ORAL at 09:06

## 2023-06-17 RX ADMIN — SEVELAMER CARBONATE 800 MG: 800 TABLET, FILM COATED ORAL at 06:06

## 2023-06-17 RX ADMIN — INSULIN ASPART 4 UNITS: 100 INJECTION, SOLUTION INTRAVENOUS; SUBCUTANEOUS at 05:06

## 2023-06-17 RX ADMIN — SEVELAMER CARBONATE 800 MG: 800 TABLET, FILM COATED ORAL at 11:06

## 2023-06-17 RX ADMIN — INSULIN ASPART 4 UNITS: 100 INJECTION, SOLUTION INTRAVENOUS; SUBCUTANEOUS at 06:06

## 2023-06-17 RX ADMIN — FERROUS SULFATE TAB 325 MG (65 MG ELEMENTAL FE) 1 EACH: 325 (65 FE) TAB at 08:06

## 2023-06-17 RX ADMIN — DOCUSATE SODIUM 100 MG: 100 CAPSULE, LIQUID FILLED ORAL at 08:06

## 2023-06-17 RX ADMIN — CARVEDILOL 25 MG: 25 TABLET, FILM COATED ORAL at 05:06

## 2023-06-17 RX ADMIN — LACTULOSE 20 G: 20 SOLUTION ORAL at 02:06

## 2023-06-17 NOTE — PLAN OF CARE
Problem: Adult Inpatient Plan of Care  Goal: Plan of Care Review  Outcome: Ongoing, Progressing  Goal: Patient-Specific Goal (Individualized)  Outcome: Ongoing, Progressing     Problem: Impaired Wound Healing  Goal: Optimal Wound Healing  Outcome: Ongoing, Progressing     Problem: Infection  Goal: Absence of Infection Signs and Symptoms  Outcome: Ongoing, Progressing

## 2023-06-17 NOTE — PROGRESS NOTES
[facility]  Nephrology  Progress Note    Patient Name: Lee Escobar  MRN: [id number]  Admission Date: 5/26/2023  Hospital Length of Stay: 22 days  Attending Provider: Macho Fox Jr., MD   Primary Care Physician: Maria Elena Sloorio II, MD  Principal Problem:Osteomyelitis of left foot    Consults  Subjective:     Interval History: The patient has no complaints today    Review of patient's allergies indicates:  No Known Allergies  Current Facility-Administered Medications   Medication Frequency    0.9%  NaCl infusion PRN    acetaminophen tablet 650 mg Q4H PRN    ALPRAZolam tablet 0.5 mg TID PRN    amLODIPine tablet 10 mg Daily    ampicillin (OMNIPEN) 2 g in sodium chloride 0.9 % 100 mL IVPB (MB+) Q12H    buPROPion TBSR 12 hr tablet 150 mg BID    carvediloL tablet 25 mg BID WM    cloNIDine tablet 0.1 mg TID    dextrose 50% injection 12.5 g PRN    dextrose 50% injection 25 g PRN    docusate sodium capsule 100 mg BID    ferrous sulfate tablet 1 each Daily    glucagon (human recombinant) injection 1 mg PRN    glucose chewable tablet 16 g PRN    glucose chewable tablet 24 g PRN    heparin (porcine) injection 5,000 Units Q8H    hydrALAZINE tablet 25 mg Q8H PRN    insulin aspart U-100 injection 0-5 Units QID (AC + HS) PRN    insulin aspart U-100 injection 4 Units TIDWM    insulin detemir U-100 injection 12 Units QHS    isosorbide mononitrate 24 hr tablet 60 mg Daily    labetaloL injection 10 mg Q4H PRN    lactulose 20 gram/30 mL solution Soln 20 g TID    linaCLOtide capsule 145 mcg Daily PRN    losartan tablet 100 mg QHS    melatonin tablet 6 mg Nightly PRN    naloxone 0.4 mg/mL injection 0.02 mg PRN    ondansetron injection 4 mg Q8H PRN    pantoprazole EC tablet 40 mg Daily    sevelamer carbonate tablet 800 mg TIDWM    silver sulfADIAZINE 1% cream Daily PRN    sodium chloride 0.9% flush 10 mL Q12H PRN       Objective:     Vital Signs (Most Recent):  Temp: 98.3 °F (36.8 °C) (06/17/23  1432)  Pulse: 69 (06/17/23 1432)  Resp: 16 (06/17/23 1432)  BP: (!) 156/57 (06/17/23 1432)  SpO2: 100 % (06/17/23 1432) Vital Signs (24h Range):  Temp:  [98.2 °F (36.8 °C)-99.1 °F (37.3 °C)] 98.3 °F (36.8 °C)  Pulse:  [69-80] 69  Resp:  [16-18] 16  SpO2:  [96 %-100 %] 100 %  BP: (151-184)/(56-86) 156/57     Weight: 70.9 kg (156 lb 3.2 oz) (06/17/23 0348)  Body mass index is 27.67 kg/m².  Body surface area is 1.78 meters squared.    I/O last 3 completed shifts:  In: 710 [P.O.:510; IV Piggyback:200]  Out: -     Physical Exam  Lungs-clear  Cor-2/6 systolic murmur or rub  Abd-soft    Significant Labs:sureCBC: No results for input(s): WBC, RBC, HGB, HCT, PLT, MCV, MCH, MCHC in the last 168 hours.  CMP:   Recent Labs   Lab 06/17/23  0436   *   CALCIUM 8.7   ALBUMIN 2.4*      K 4.5   CO2 29   CL 99   BUN 61*   CREATININE 4.83*     All labs within the past 24 hours have been reviewed.    Significant Imaging:      Assessment/Plan:     Active Diagnoses:    Diagnosis Date Noted POA    PRINCIPAL PROBLEM:  Osteomyelitis of left foot [M86.9] 05/25/2023 Yes    Right flank pain [R10.9] 06/16/2023 Yes    PVD (peripheral vascular disease) [I73.9] 06/06/2023 Yes    Paraplegia following spinal cord injury [G82.20]  Not Applicable     Chronic    Diabetic ulcer of toe of left foot associated with type 2 diabetes mellitus, with bone involvement without evidence of necrosis [E11.621, L97.526] 05/31/2023 Yes    Pressure injury of left heel, unstageable [L89.620] 05/31/2023 Yes    Pressure injury of right buttock, stage 2 [L89.312] 05/31/2023 Yes    HTN (hypertension) [I10] 05/25/2023 Yes     Chronic    Type 2 diabetes mellitus with kidney complication, with long-term current use of insulin [E11.29, Z79.4] 05/25/2023 Not Applicable     Chronic    Constipation [K59.00] 05/25/2023 Yes     Chronic    ESRD (end stage renal disease) [N18.6] 05/25/2023 Yes     Chronic    Wound infection [T14.8XXA, L08.9] 05/25/2023 Yes    Major  depression [F32.9] 05/25/2023 Yes      Problems Resolved During this Admission:       Plan:  Continue the present care    Thank you for your consult. I will follow-up with patient. Please contact us if you have any additional questions.    Danny Peter MD  Nephrology  [facility]

## 2023-06-17 NOTE — DIALYSIS ROUNDING
The patient was dialyzed today without complication.There was a net fluid removal of 2.5 liters    PE  Lungs-clear  Cor-2/6 systolic murmur   Abd-soft    PLAN:  Continue the present management

## 2023-06-18 PROBLEM — N18.6 ESRD (END STAGE RENAL DISEASE): Status: ACTIVE | Noted: 2023-05-25

## 2023-06-18 PROBLEM — Z79.4 TYPE 2 DIABETES MELLITUS WITH KIDNEY COMPLICATION, WITH LONG-TERM CURRENT USE OF INSULIN: Chronic | Status: RESOLVED | Noted: 2023-05-25 | Resolved: 2023-06-18

## 2023-06-18 PROBLEM — R10.9 RIGHT FLANK PAIN: Status: RESOLVED | Noted: 2023-06-16 | Resolved: 2023-06-18

## 2023-06-18 PROBLEM — E11.29 TYPE 2 DIABETES MELLITUS WITH KIDNEY COMPLICATION, WITH LONG-TERM CURRENT USE OF INSULIN: Chronic | Status: RESOLVED | Noted: 2023-05-25 | Resolved: 2023-06-18

## 2023-06-18 LAB
GLUCOSE SERPL-MCNC: 118 MG/DL (ref 70–105)
GLUCOSE SERPL-MCNC: 121 MG/DL (ref 70–105)
GLUCOSE SERPL-MCNC: 156 MG/DL (ref 70–105)
GLUCOSE SERPL-MCNC: 161 MG/DL (ref 70–105)

## 2023-06-18 PROCEDURE — 99232 PR SUBSEQUENT HOSPITAL CARE,LEVL II: ICD-10-PCS | Mod: ,,, | Performed by: FAMILY MEDICINE

## 2023-06-18 PROCEDURE — 25000003 PHARM REV CODE 250: Performed by: NURSE PRACTITIONER

## 2023-06-18 PROCEDURE — 63600175 PHARM REV CODE 636 W HCPCS: Performed by: HOSPITALIST

## 2023-06-18 PROCEDURE — 25000003 PHARM REV CODE 250: Performed by: HOSPITALIST

## 2023-06-18 PROCEDURE — 11000001 HC ACUTE MED/SURG PRIVATE ROOM

## 2023-06-18 PROCEDURE — 82962 GLUCOSE BLOOD TEST: CPT

## 2023-06-18 PROCEDURE — 99232 SBSQ HOSP IP/OBS MODERATE 35: CPT | Mod: ,,, | Performed by: FAMILY MEDICINE

## 2023-06-18 PROCEDURE — 63600175 PHARM REV CODE 636 W HCPCS: Performed by: NURSE PRACTITIONER

## 2023-06-18 RX ADMIN — CARVEDILOL 25 MG: 25 TABLET, FILM COATED ORAL at 04:06

## 2023-06-18 RX ADMIN — INSULIN DETEMIR 12 UNITS: 100 INJECTION, SOLUTION SUBCUTANEOUS at 09:06

## 2023-06-18 RX ADMIN — CLONIDINE HYDROCHLORIDE 0.1 MG: 0.1 TABLET ORAL at 08:06

## 2023-06-18 RX ADMIN — BUPROPION HYDROCHLORIDE 150 MG: 150 TABLET, FILM COATED, EXTENDED RELEASE ORAL at 08:06

## 2023-06-18 RX ADMIN — CLONIDINE HYDROCHLORIDE 0.1 MG: 0.1 TABLET ORAL at 09:06

## 2023-06-18 RX ADMIN — HEPARIN SODIUM 5000 UNITS: 5000 INJECTION INTRAVENOUS; SUBCUTANEOUS at 06:06

## 2023-06-18 RX ADMIN — ISOSORBIDE MONONITRATE 60 MG: 30 TABLET, EXTENDED RELEASE ORAL at 08:06

## 2023-06-18 RX ADMIN — AMPICILLIN SODIUM 2 G: 2 INJECTION, POWDER, FOR SOLUTION INTRAMUSCULAR; INTRAVENOUS at 04:06

## 2023-06-18 RX ADMIN — LACTULOSE 20 G: 20 SOLUTION ORAL at 08:06

## 2023-06-18 RX ADMIN — FERROUS SULFATE TAB 325 MG (65 MG ELEMENTAL FE) 1 EACH: 325 (65 FE) TAB at 08:06

## 2023-06-18 RX ADMIN — SEVELAMER CARBONATE 800 MG: 800 TABLET, FILM COATED ORAL at 06:06

## 2023-06-18 RX ADMIN — CARVEDILOL 25 MG: 25 TABLET, FILM COATED ORAL at 08:06

## 2023-06-18 RX ADMIN — INSULIN ASPART 4 UNITS: 100 INJECTION, SOLUTION INTRAVENOUS; SUBCUTANEOUS at 06:06

## 2023-06-18 RX ADMIN — AMLODIPINE BESYLATE 10 MG: 10 TABLET ORAL at 08:06

## 2023-06-18 RX ADMIN — LACTULOSE 20 G: 20 SOLUTION ORAL at 02:06

## 2023-06-18 RX ADMIN — DOCUSATE SODIUM 100 MG: 100 CAPSULE, LIQUID FILLED ORAL at 09:06

## 2023-06-18 RX ADMIN — DOCUSATE SODIUM 100 MG: 100 CAPSULE, LIQUID FILLED ORAL at 08:06

## 2023-06-18 RX ADMIN — LOSARTAN POTASSIUM 100 MG: 100 TABLET, FILM COATED ORAL at 09:06

## 2023-06-18 RX ADMIN — HEPARIN SODIUM 5000 UNITS: 5000 INJECTION INTRAVENOUS; SUBCUTANEOUS at 02:06

## 2023-06-18 RX ADMIN — BUPROPION HYDROCHLORIDE 150 MG: 150 TABLET, FILM COATED, EXTENDED RELEASE ORAL at 09:06

## 2023-06-18 RX ADMIN — PANTOPRAZOLE SODIUM 40 MG: 40 TABLET, DELAYED RELEASE ORAL at 08:06

## 2023-06-18 RX ADMIN — SEVELAMER CARBONATE 800 MG: 800 TABLET, FILM COATED ORAL at 04:06

## 2023-06-18 RX ADMIN — INSULIN ASPART 4 UNITS: 100 INJECTION, SOLUTION INTRAVENOUS; SUBCUTANEOUS at 04:06

## 2023-06-18 RX ADMIN — HEPARIN SODIUM 5000 UNITS: 5000 INJECTION INTRAVENOUS; SUBCUTANEOUS at 09:06

## 2023-06-18 RX ADMIN — SEVELAMER CARBONATE 800 MG: 800 TABLET, FILM COATED ORAL at 12:06

## 2023-06-18 RX ADMIN — CLONIDINE HYDROCHLORIDE 0.1 MG: 0.1 TABLET ORAL at 02:06

## 2023-06-18 RX ADMIN — INSULIN ASPART 4 UNITS: 100 INJECTION, SOLUTION INTRAVENOUS; SUBCUTANEOUS at 12:06

## 2023-06-18 RX ADMIN — AMPICILLIN SODIUM 2 G: 2 INJECTION, POWDER, FOR SOLUTION INTRAMUSCULAR; INTRAVENOUS at 03:06
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
## 2023-06-18 NOTE — SUBJECTIVE & OBJECTIVE
Interval History: Pleasant young man. No acute complaints.     Review of Systems  Objective:     Vital Signs (Most Recent):  Temp: 98.2 °F (36.8 °C) (06/18/23 0500)  Pulse: 69 (06/18/23 0500)  Resp: 18 (06/18/23 0500)  BP: (!) 165/77 (06/18/23 0500)  SpO2: 98 % (06/18/23 0500) Vital Signs (24h Range):  Temp:  [98.2 °F (36.8 °C)-98.5 °F (36.9 °C)] 98.2 °F (36.8 °C)  Pulse:  [69-73] 69  Resp:  [16-18] 18  SpO2:  [98 %-100 %] 98 %  BP: (130-165)/(57-77) 165/77     Weight: 70.9 kg (156 lb 3.2 oz)  Body mass index is 27.67 kg/m².  No intake or output data in the 24 hours ending 06/18/23 0648      Physical Exam  Vitals reviewed.   Constitutional:       General: He is awake. He is not in acute distress.     Appearance: He is well-developed. He is not toxic-appearing.   HENT:      Head: Normocephalic.      Nose: Nose normal.      Mouth/Throat:      Pharynx: Oropharynx is clear.   Eyes:      Extraocular Movements: Extraocular movements intact.      Pupils: Pupils are equal, round, and reactive to light.   Neck:      Thyroid: No thyroid mass.      Vascular: No carotid bruit.   Cardiovascular:      Rate and Rhythm: Normal rate and regular rhythm.      Pulses: Normal pulses.      Heart sounds: Normal heart sounds. No murmur heard.  Pulmonary:      Effort: Pulmonary effort is normal.      Breath sounds: Normal breath sounds and air entry. No wheezing.   Abdominal:      General: Bowel sounds are normal. There is no distension.      Palpations: Abdomen is soft.      Tenderness: There is no abdominal tenderness.   Musculoskeletal:         General: Signs of injury present. Normal range of motion.      Cervical back: Neck supple. No rigidity.      Comments: Wounds to 1st and 2nd digit left foot that are currently dressed   Skin:     General: Skin is warm.      Coloration: Skin is not jaundiced.      Findings: No lesion.   Neurological:      Mental Status: He is alert and oriented to person, place, and time.      Cranial Nerves: No  cranial nerve deficit.      Motor: Weakness present.      Gait: Gait abnormal.      Comments:  1 over 5  weakness to bilateral lower extremities   Psychiatric:         Attention and Perception: Attention normal.         Mood and Affect: Mood normal.         Behavior: Behavior normal. Behavior is cooperative.         Thought Content: Thought content normal.         Cognition and Memory: Cognition normal.           Significant Labs: All pertinent labs within the past 24 hours have been reviewed.    Significant Imaging: I have reviewed all pertinent imaging results/findings within the past 24 hours.

## 2023-06-18 NOTE — SUBJECTIVE & OBJECTIVE
Interval History: No complaints, Ordered food through door dash.  Notified of grill opportunities.     Review of Systems  Objective:     Vital Signs (Most Recent):  Temp: 98 °F (36.7 °C) (06/18/23 1200)  Pulse: 100 (06/18/23 1200)  Resp: 19 (06/18/23 1200)  BP: (!) 175/63 (nurse aware) (06/18/23 1200)  SpO2: 98 % (06/18/23 1200) Vital Signs (24h Range):  Temp:  [98 °F (36.7 °C)-98.5 °F (36.9 °C)] 98 °F (36.7 °C)  Pulse:  [] 100  Resp:  [16-19] 19  SpO2:  [98 %-100 %] 98 %  BP: (130-177)/(57-77) 175/63     Weight: 70.9 kg (156 lb 3.2 oz)  Body mass index is 27.67 kg/m².    Intake/Output Summary (Last 24 hours) at 6/18/2023 1410  Last data filed at 6/18/2023 0850  Gross per 24 hour   Intake 240 ml   Output --   Net 240 ml         Physical Exam  Vitals reviewed.   Constitutional:       General: He is awake. He is not in acute distress.     Appearance: He is well-developed. He is not toxic-appearing.   HENT:      Head: Normocephalic.      Nose: Nose normal.      Mouth/Throat:      Pharynx: Oropharynx is clear.   Eyes:      Extraocular Movements: Extraocular movements intact.      Pupils: Pupils are equal, round, and reactive to light.   Neck:      Thyroid: No thyroid mass.      Vascular: No carotid bruit.   Cardiovascular:      Rate and Rhythm: Normal rate and regular rhythm.      Pulses: Normal pulses.      Heart sounds: Normal heart sounds. No murmur heard.  Pulmonary:      Effort: Pulmonary effort is normal.      Breath sounds: Normal breath sounds and air entry. No wheezing.   Abdominal:      General: Bowel sounds are normal. There is no distension.      Palpations: Abdomen is soft.      Tenderness: There is no abdominal tenderness.   Musculoskeletal:         General: Signs of injury present. Normal range of motion.      Cervical back: Neck supple. No rigidity.      Comments: Wounds to 1st and 2nd digit left foot that are currently dressed   Skin:     General: Skin is warm.      Coloration: Skin is not  jaundiced.      Findings: No lesion.   Neurological:      Mental Status: He is alert and oriented to person, place, and time.      Cranial Nerves: No cranial nerve deficit.      Motor: Weakness present.      Gait: Gait abnormal.      Comments:  1 over 5  weakness to bilateral lower extremities   Psychiatric:         Attention and Perception: Attention normal.         Mood and Affect: Mood normal.         Behavior: Behavior normal. Behavior is cooperative.         Thought Content: Thought content normal.         Cognition and Memory: Cognition normal.           Significant Labs: All pertinent labs within the past 24 hours have been reviewed.  BMP:   Recent Labs   Lab 06/17/23  0436   *      K 4.5   CL 99   CO2 29   BUN 61*   CREATININE 4.83*   CALCIUM 8.7     CBC: No results for input(s): WBC, HGB, HCT, PLT in the last 48 hours.    Significant Imaging: I have reviewed all pertinent imaging results/findings within the past 24 hours.

## 2023-06-19 LAB
ALBUMIN SERPL BCP-MCNC: 2.3 G/DL (ref 3.5–5)
ANION GAP SERPL CALCULATED.3IONS-SCNC: 18 MMOL/L (ref 7–16)
BUN SERPL-MCNC: 119 MG/DL (ref 7–18)
BUN/CREAT SERPL: 16 (ref 6–20)
CALCIUM SERPL-MCNC: 8.6 MG/DL (ref 8.5–10.1)
CHLORIDE SERPL-SCNC: 97 MMOL/L (ref 98–107)
CO2 SERPL-SCNC: 26 MMOL/L (ref 21–32)
CREAT SERPL-MCNC: 7.4 MG/DL (ref 0.7–1.3)
EGFR (NO RACE VARIABLE) (RUSH/TITUS): 9 ML/MIN/1.73M2
GLUCOSE SERPL-MCNC: 147 MG/DL (ref 70–105)
GLUCOSE SERPL-MCNC: 153 MG/DL (ref 74–106)
GLUCOSE SERPL-MCNC: 156 MG/DL (ref 70–105)
GLUCOSE SERPL-MCNC: 156 MG/DL (ref 70–105)
GLUCOSE SERPL-MCNC: 182 MG/DL (ref 70–105)
GLUCOSE SERPL-MCNC: 68 MG/DL (ref 70–105)
PHOSPHATE SERPL-MCNC: 4.9 MG/DL (ref 2.5–4.5)
POTASSIUM SERPL-SCNC: 5.4 MMOL/L (ref 3.5–5.1)
SODIUM SERPL-SCNC: 136 MMOL/L (ref 136–145)

## 2023-06-19 PROCEDURE — A6212 FOAM DRG <=16 SQ IN W/BORDER: HCPCS

## 2023-06-19 PROCEDURE — 25000003 PHARM REV CODE 250: Performed by: HOSPITALIST

## 2023-06-19 PROCEDURE — 25000003 PHARM REV CODE 250: Performed by: INTERNAL MEDICINE

## 2023-06-19 PROCEDURE — 63600175 PHARM REV CODE 636 W HCPCS: Performed by: NURSE PRACTITIONER

## 2023-06-19 PROCEDURE — 80069 RENAL FUNCTION PANEL: CPT | Performed by: HOSPITALIST

## 2023-06-19 PROCEDURE — 25000003 PHARM REV CODE 250: Performed by: NURSE PRACTITIONER

## 2023-06-19 PROCEDURE — 63600175 PHARM REV CODE 636 W HCPCS: Performed by: HOSPITALIST

## 2023-06-19 PROCEDURE — 99232 PR SUBSEQUENT HOSPITAL CARE,LEVL II: ICD-10-PCS | Mod: ,,, | Performed by: FAMILY MEDICINE

## 2023-06-19 PROCEDURE — 99232 SBSQ HOSP IP/OBS MODERATE 35: CPT | Mod: ,,, | Performed by: FAMILY MEDICINE

## 2023-06-19 PROCEDURE — 82962 GLUCOSE BLOOD TEST: CPT

## 2023-06-19 PROCEDURE — 11000001 HC ACUTE MED/SURG PRIVATE ROOM

## 2023-06-19 RX ADMIN — HEPARIN SODIUM 5000 UNITS: 5000 INJECTION INTRAVENOUS; SUBCUTANEOUS at 05:06

## 2023-06-19 RX ADMIN — DOCUSATE SODIUM 100 MG: 100 CAPSULE, LIQUID FILLED ORAL at 09:06

## 2023-06-19 RX ADMIN — LACTULOSE 20 G: 20 SOLUTION ORAL at 04:06

## 2023-06-19 RX ADMIN — LACTULOSE 20 G: 20 SOLUTION ORAL at 09:06

## 2023-06-19 RX ADMIN — CLONIDINE HYDROCHLORIDE 0.1 MG: 0.1 TABLET ORAL at 04:06

## 2023-06-19 RX ADMIN — FERROUS SULFATE TAB 325 MG (65 MG ELEMENTAL FE) 1 EACH: 325 (65 FE) TAB at 09:06

## 2023-06-19 RX ADMIN — SODIUM CHLORIDE: 9 INJECTION, SOLUTION INTRAVENOUS at 02:06

## 2023-06-19 RX ADMIN — SEVELAMER CARBONATE 800 MG: 800 TABLET, FILM COATED ORAL at 06:06

## 2023-06-19 RX ADMIN — BUPROPION HYDROCHLORIDE 150 MG: 150 TABLET, FILM COATED, EXTENDED RELEASE ORAL at 09:06

## 2023-06-19 RX ADMIN — SEVELAMER CARBONATE 800 MG: 800 TABLET, FILM COATED ORAL at 11:06

## 2023-06-19 RX ADMIN — PANTOPRAZOLE SODIUM 40 MG: 40 TABLET, DELAYED RELEASE ORAL at 09:06

## 2023-06-19 RX ADMIN — CARVEDILOL 25 MG: 25 TABLET, FILM COATED ORAL at 04:06

## 2023-06-19 RX ADMIN — AMPICILLIN SODIUM 2 G: 2 INJECTION, POWDER, FOR SOLUTION INTRAMUSCULAR; INTRAVENOUS at 04:06

## 2023-06-19 RX ADMIN — AMPICILLIN SODIUM 2 G: 2 INJECTION, POWDER, FOR SOLUTION INTRAMUSCULAR; INTRAVENOUS at 05:06

## 2023-06-19 RX ADMIN — INSULIN ASPART 4 UNITS: 100 INJECTION, SOLUTION INTRAVENOUS; SUBCUTANEOUS at 06:06

## 2023-06-19 RX ADMIN — CLONIDINE HYDROCHLORIDE 0.1 MG: 0.1 TABLET ORAL at 09:06

## 2023-06-19 RX ADMIN — LOSARTAN POTASSIUM 100 MG: 100 TABLET, FILM COATED ORAL at 09:06

## 2023-06-19 RX ADMIN — SEVELAMER CARBONATE 800 MG: 800 TABLET, FILM COATED ORAL at 04:06

## 2023-06-19 RX ADMIN — HEPARIN SODIUM 5000 UNITS: 5000 INJECTION INTRAVENOUS; SUBCUTANEOUS at 09:06

## 2023-06-19 RX ADMIN — AMLODIPINE BESYLATE 10 MG: 10 TABLET ORAL at 09:06

## 2023-06-19 RX ADMIN — CARVEDILOL 25 MG: 25 TABLET, FILM COATED ORAL at 08:06

## 2023-06-19 RX ADMIN — ISOSORBIDE MONONITRATE 60 MG: 30 TABLET, EXTENDED RELEASE ORAL at 09:06

## 2023-06-19 RX ADMIN — INSULIN ASPART 4 UNITS: 100 INJECTION, SOLUTION INTRAVENOUS; SUBCUTANEOUS at 05:06

## 2023-06-19 RX ADMIN — INSULIN DETEMIR 12 UNITS: 100 INJECTION, SOLUTION SUBCUTANEOUS at 09:06

## 2023-06-19 NOTE — PROGRESS NOTES
[facility]  Nephrology  Progress Note    Patient Name: Lee Escobar  MRN: [id number]  Admission Date: 5/26/2023  Hospital Length of Stay: 23 days  Attending Provider: Macho Fox Jr., MD   Primary Care Physician: Maria Elena Solorio II, MD  Principal Problem:Osteomyelitis of left foot    Consults  Subjective:     Interval History: The patient has no complaints today.    Review of patient's allergies indicates:  No Known Allergies  Current Facility-Administered Medications   Medication Frequency    0.9%  NaCl infusion PRN    acetaminophen tablet 650 mg Q4H PRN    ALPRAZolam tablet 0.5 mg TID PRN    amLODIPine tablet 10 mg Daily    ampicillin (OMNIPEN) 2 g in sodium chloride 0.9 % 100 mL IVPB (MB+) Q12H    buPROPion TBSR 12 hr tablet 150 mg BID    carvediloL tablet 25 mg BID WM    cloNIDine tablet 0.1 mg TID    dextrose 50% injection 12.5 g PRN    dextrose 50% injection 25 g PRN    docusate sodium capsule 100 mg BID    ferrous sulfate tablet 1 each Daily    glucagon (human recombinant) injection 1 mg PRN    glucose chewable tablet 16 g PRN    glucose chewable tablet 24 g PRN    heparin (porcine) injection 5,000 Units Q8H    hydrALAZINE tablet 25 mg Q8H PRN    insulin aspart U-100 injection 0-5 Units QID (AC + HS) PRN    insulin aspart U-100 injection 4 Units TIDWM    insulin detemir U-100 injection 12 Units QHS    isosorbide mononitrate 24 hr tablet 60 mg Daily    labetaloL injection 10 mg Q4H PRN    lactulose 20 gram/30 mL solution Soln 20 g TID    linaCLOtide capsule 145 mcg Daily PRN    losartan tablet 100 mg QHS    melatonin tablet 6 mg Nightly PRN    naloxone 0.4 mg/mL injection 0.02 mg PRN    ondansetron injection 4 mg Q8H PRN    pantoprazole EC tablet 40 mg Daily    sevelamer carbonate tablet 800 mg TIDWM    silver sulfADIAZINE 1% cream Daily PRN    sodium chloride 0.9% flush 10 mL Q12H PRN       Objective:     Vital Signs (Most Recent):  Temp: 98.1 °F (36.7 °C) (06/18/23  2000)  Pulse: 75 (06/18/23 2000)  Resp: 18 (06/18/23 2000)  BP: (!) 143/50 (06/18/23 2000)  SpO2: 98 % (06/18/23 2000) Vital Signs (24h Range):  Temp:  [98 °F (36.7 °C)-98.3 °F (36.8 °C)] 98.1 °F (36.7 °C)  Pulse:  [] 75  Resp:  [18-20] 18  SpO2:  [98 %-99 %] 98 %  BP: (130-180)/(50-77) 143/50     Weight: 70.9 kg (156 lb 3.2 oz) (06/17/23 0348)  Body mass index is 27.67 kg/m².  Body surface area is 1.78 meters squared.    I/O last 3 completed shifts:  In: 720 [P.O.:720]  Out: -     Physical Exam  Lungs-clear  Cor-2/6 systolic murmur  Abd-soft    Significant Labs:sureCBC: No results for input(s): WBC, RBC, HGB, HCT, PLT, MCV, MCH, MCHC in the last 168 hours.  CMP:   Recent Labs   Lab 06/17/23  0436   *   CALCIUM 8.7   ALBUMIN 2.4*      K 4.5   CO2 29   CL 99   BUN 61*   CREATININE 4.83*     All labs within the past 24 hours have been reviewed.    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]  Not Applicable     Chronic    Diabetic ulcer of toe of left foot associated with type 2 diabetes mellitus, with bone involvement without evidence of necrosis [E11.621, L97.526] 05/31/2023 Yes    Pressure injury of left heel, unstageable [L89.620] 05/31/2023 Yes    Pressure injury of right buttock, stage 2 [L89.312] 05/31/2023 Yes    HTN (hypertension) [I10] 05/25/2023 Yes     Chronic    Constipation [K59.00] 05/25/2023 Yes     Chronic    ESRD (end stage renal disease) [N18.6] 05/25/2023 Yes    Wound infection [T14.8XXA, L08.9] 05/25/2023 Yes    Major depression [F32.9] 05/25/2023 Yes      Problems Resolved During this Admission:    Diagnosis Date Noted Date Resolved POA    Right flank pain [R10.9] 06/16/2023 06/18/2023 Yes    Type 2 diabetes mellitus with kidney complication, with long-term current use of insulin [E11.29, Z79.4] 05/25/2023  06/18/2023 Not Applicable     Chronic       Plan:  Hemodialysis tomorrow    Thank you for your consult. I will follow-up with patient. Please contact us if you have any additional questions.    Danny Peter MD  Nephrology  [facility]
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
## 2023-06-19 NOTE — PLAN OF CARE
Problem: Adult Inpatient Plan of Care  Goal: Plan of Care Review  Outcome: Ongoing, Progressing  Goal: Patient-Specific Goal (Individualized)  Outcome: Ongoing, Progressing  Goal: Absence of Hospital-Acquired Illness or Injury  Outcome: Ongoing, Progressing  Goal: Optimal Comfort and Wellbeing  Outcome: Ongoing, Progressing  Goal: Readiness for Transition of Care  Outcome: Ongoing, Progressing     Problem: Fall Injury Risk  Goal: Absence of Fall and Fall-Related Injury  Outcome: Ongoing, Progressing     Problem: Skin Injury Risk Increased  Goal: Skin Health and Integrity  Outcome: Ongoing, Progressing     Problem: Device-Related Complication Risk (Hemodialysis)  Goal: Safe, Effective Therapy Delivery  Outcome: Ongoing, Progressing

## 2023-06-19 NOTE — PLAN OF CARE
Problem: Adult Inpatient Plan of Care  Goal: Plan of Care Review  Outcome: Ongoing, Progressing     Problem: Adult Inpatient Plan of Care  Goal: Patient-Specific Goal (Individualized)  Outcome: Ongoing, Progressing     Problem: Adult Inpatient Plan of Care  Goal: Absence of Hospital-Acquired Illness or Injury  Outcome: Ongoing, Progressing     Problem: Diabetes Comorbidity  Goal: Blood Glucose Level Within Targeted Range  Outcome: Ongoing, Progressing     Problem: Impaired Wound Healing  Goal: Optimal Wound Healing  Outcome: Ongoing, Progressing     Problem: Fall Injury Risk  Goal: Absence of Fall and Fall-Related Injury  Outcome: Ongoing, Progressing

## 2023-06-19 NOTE — PROGRESS NOTES
Wound care note;  Patient skin was evaluated today  Patient in bed, Alert.   Sacral area with scattered pink denudes areas noted within old scar tissue. Cont Optifoam border . Will ask VINOD Mendoza to assess in am 6/20/23.   Right ischial wound remains moist and macerated. Cont silvadene moist guaze for now.   Left heel with dry eschar , cont silvadene cream for now.   Cont turn protocol  Waffle boots to heels   On low air loss mattress  Wound care team to follow

## 2023-06-20 LAB
GLUCOSE SERPL-MCNC: 107 MG/DL (ref 70–105)
GLUCOSE SERPL-MCNC: 127 MG/DL (ref 70–105)
GLUCOSE SERPL-MCNC: 127 MG/DL (ref 70–105)
GLUCOSE SERPL-MCNC: 135 MG/DL (ref 70–105)

## 2023-06-20 PROCEDURE — A6212 FOAM DRG <=16 SQ IN W/BORDER: HCPCS

## 2023-06-20 PROCEDURE — 99232 SBSQ HOSP IP/OBS MODERATE 35: CPT | Mod: ,,, | Performed by: FAMILY MEDICINE

## 2023-06-20 PROCEDURE — 11000001 HC ACUTE MED/SURG PRIVATE ROOM

## 2023-06-20 PROCEDURE — 25000003 PHARM REV CODE 250: Performed by: HOSPITALIST

## 2023-06-20 PROCEDURE — 25000003 PHARM REV CODE 250: Performed by: NURSE PRACTITIONER

## 2023-06-20 PROCEDURE — 63600175 PHARM REV CODE 636 W HCPCS: Performed by: HOSPITALIST

## 2023-06-20 PROCEDURE — 11042 DBRDMT SUBQ TIS 1ST 20SQCM/<: CPT | Mod: ,,, | Performed by: NURSE PRACTITIONER

## 2023-06-20 PROCEDURE — 99232 PR SUBSEQUENT HOSPITAL CARE,LEVL II: ICD-10-PCS | Mod: ,,, | Performed by: FAMILY MEDICINE

## 2023-06-20 PROCEDURE — 63600175 PHARM REV CODE 636 W HCPCS: Performed by: NURSE PRACTITIONER

## 2023-06-20 PROCEDURE — 11042 DEBRIDEMENT: ICD-10-PCS | Mod: ,,, | Performed by: NURSE PRACTITIONER

## 2023-06-20 PROCEDURE — 82962 GLUCOSE BLOOD TEST: CPT

## 2023-06-20 RX ADMIN — INSULIN ASPART 4 UNITS: 100 INJECTION, SOLUTION INTRAVENOUS; SUBCUTANEOUS at 12:06

## 2023-06-20 RX ADMIN — HYDRALAZINE HYDROCHLORIDE 25 MG: 25 TABLET ORAL at 06:06

## 2023-06-20 RX ADMIN — ISOSORBIDE MONONITRATE 60 MG: 30 TABLET, EXTENDED RELEASE ORAL at 09:06

## 2023-06-20 RX ADMIN — HEPARIN SODIUM 5000 UNITS: 5000 INJECTION INTRAVENOUS; SUBCUTANEOUS at 09:06

## 2023-06-20 RX ADMIN — INSULIN DETEMIR 12 UNITS: 100 INJECTION, SOLUTION SUBCUTANEOUS at 09:06

## 2023-06-20 RX ADMIN — DOCUSATE SODIUM 100 MG: 100 CAPSULE, LIQUID FILLED ORAL at 09:06

## 2023-06-20 RX ADMIN — LOSARTAN POTASSIUM 100 MG: 100 TABLET, FILM COATED ORAL at 09:06

## 2023-06-20 RX ADMIN — BUPROPION HYDROCHLORIDE 150 MG: 150 TABLET, FILM COATED, EXTENDED RELEASE ORAL at 09:06

## 2023-06-20 RX ADMIN — AMLODIPINE BESYLATE 10 MG: 10 TABLET ORAL at 09:06

## 2023-06-20 RX ADMIN — AMPICILLIN SODIUM 2 G: 2 INJECTION, POWDER, FOR SOLUTION INTRAMUSCULAR; INTRAVENOUS at 04:06

## 2023-06-20 RX ADMIN — CLONIDINE HYDROCHLORIDE 0.1 MG: 0.1 TABLET ORAL at 09:06

## 2023-06-20 RX ADMIN — SEVELAMER CARBONATE 800 MG: 800 TABLET, FILM COATED ORAL at 06:06

## 2023-06-20 RX ADMIN — SEVELAMER CARBONATE 800 MG: 800 TABLET, FILM COATED ORAL at 12:06

## 2023-06-20 RX ADMIN — INSULIN ASPART 4 UNITS: 100 INJECTION, SOLUTION INTRAVENOUS; SUBCUTANEOUS at 04:06

## 2023-06-20 RX ADMIN — HEPARIN SODIUM 5000 UNITS: 5000 INJECTION INTRAVENOUS; SUBCUTANEOUS at 02:06

## 2023-06-20 RX ADMIN — LACTULOSE 20 G: 20 SOLUTION ORAL at 09:06

## 2023-06-20 RX ADMIN — CLONIDINE HYDROCHLORIDE 0.1 MG: 0.1 TABLET ORAL at 02:06

## 2023-06-20 RX ADMIN — ONDANSETRON HYDROCHLORIDE 4 MG: 2 SOLUTION INTRAMUSCULAR; INTRAVENOUS at 07:06

## 2023-06-20 RX ADMIN — CARVEDILOL 25 MG: 25 TABLET, FILM COATED ORAL at 07:06

## 2023-06-20 RX ADMIN — PANTOPRAZOLE SODIUM 40 MG: 40 TABLET, DELAYED RELEASE ORAL at 09:06

## 2023-06-20 RX ADMIN — LACTULOSE 20 G: 20 SOLUTION ORAL at 02:06

## 2023-06-20 RX ADMIN — CARVEDILOL 25 MG: 25 TABLET, FILM COATED ORAL at 04:06

## 2023-06-20 RX ADMIN — FERROUS SULFATE TAB 325 MG (65 MG ELEMENTAL FE) 1 EACH: 325 (65 FE) TAB at 09:06

## 2023-06-20 RX ADMIN — SEVELAMER CARBONATE 800 MG: 800 TABLET, FILM COATED ORAL at 04:06

## 2023-06-20 RX ADMIN — HEPARIN SODIUM 5000 UNITS: 5000 INJECTION INTRAVENOUS; SUBCUTANEOUS at 06:06

## 2023-06-20 NOTE — DIALYSIS ROUNDING
The patient was dialyzed today without complication.  PE  Lungs-crackles   Cor-2/6 systolic murmur   Abd-soft    Plan:  Continue the present management
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
## 2023-06-20 NOTE — PROCEDURES
"Lee Escobar is a 37 y.o. male patient.    Temp: 98 °F (36.7 °C) (06/20/23 1200)  Pulse: 71 (06/20/23 1200)  Resp: 18 (06/20/23 1200)  BP: (!) 152/51 (06/20/23 1200)  SpO2: 100 % (06/20/23 1200)  Weight: 71.7 kg (158 lb 0.8 oz) (06/19/23 0500)  Height: 5' 3" (160 cm) (05/26/23 1659)       Debridement    Date/Time: 6/20/2023 1:08 PM  Performed by: COLTEN Coreas  Authorized by: COLTEN Coreas     Time out: Immediately prior to procedure a "time out" was called to verify the correct patient, procedure, equipment, support staff and site/side marked as required.    Consent Done?:  Yes (Written)    Wound Details:    Location:  Left foot    Location:  Left Heel    Type of Debridement:  Excisional       Length (cm):  1.5       Area (sq cm):  2.25       Width (cm):  1.5       Percent Debrided (%):  100       Depth (cm):  0.3       Total Area Debrided (sq cm):  2.25    Depth of debridement:  Subcutaneous tissue    Tissue debrided:  Adipose, Dermis, Epidermis and Subcutaneous    Devitalized tissue debrided:  Biofilm, Clots, Callus, Exudate, Necrotic/Eschar and Slough    Instruments:  Scissors  Bleeding:  Minimal  Hemostasis Achieved: Yes  Method Used:  Pressure  Patient tolerance:  Patient tolerated the procedure well with no immediate complications  1st Wound Pain Assessment: 0     Assistant DAVID Galvin LPN    6/20/2023    "

## 2023-06-20 NOTE — ASSESSMENT & PLAN NOTE
1st and 2nd toe Left foot (acute) Osteomyelitis. Complicated by moderate vascular disease of Lower extremities.  CTA Bilateral lower extremities reveals moderate to severe calcified vascular disease affects the arteries of the bilateral lower extremities.    -medical management with Vancomycin and cefepime started on 5/25/23  -wound care consulted    5/29: may require biopsy and debridement by general surgery.  Per their last note, follow-up will be this week.      5/30: Wound culture with Proteus and Enterococcus both sensitive to ampicillin, with adjust therapy    Antibiotics (From admission, onward)    Start     Stop Route Frequency Ordered    06/10/23 1700  ampicillin (OMNIPEN) 2 g in sodium chloride 0.9 % 100 mL IVPB (MB+)         07/07 1559 IV Every 12 hours 06/10/23 1650    06/09/23 1526  silver sulfADIAZINE 1% cream         -- Top Daily PRN 06/09/23 1431    05/29/23 1130  mupirocin 2 % ointment         06/03 0859 Nasl 2 times daily 05/29/23 1028        6/19 - Course of abx continues.

## 2023-06-20 NOTE — ASSESSMENT & PLAN NOTE
Patient has persistent depression which is moderate and is currently controlled. Will Continue anti-depressant medications. We will not consult psychiatry at this time. Patient does not display psychosis at this time. Continue to monitor closely and adjust plan of care as needed.  Continue home antidepressant.    6/19 - Euthymic

## 2023-06-20 NOTE — SUBJECTIVE & OBJECTIVE
Interval History: No complaints. Seen on HD.     Review of Systems  Objective:     Vital Signs (Most Recent):  Temp: 99.2 °F (37.3 °C) (06/20/23 0400)  Pulse: 77 (06/20/23 0400)  Resp: 18 (06/20/23 0400)  BP: (!) 175/91 (06/20/23 0626)  SpO2: 99 % (06/20/23 0400) Vital Signs (24h Range):  Temp:  [98.2 °F (36.8 °C)-99.2 °F (37.3 °C)] 99.2 °F (37.3 °C)  Pulse:  [66-81] 77  Resp:  [16-19] 18  SpO2:  [99 %-100 %] 99 %  BP: (141-192)/(43-91) 175/91     Weight: 71.7 kg (158 lb 0.8 oz)  Body mass index is 28 kg/m².    Intake/Output Summary (Last 24 hours) at 6/20/2023 0716  Last data filed at 6/20/2023 0555  Gross per 24 hour   Intake 320 ml   Output --   Net 320 ml         Physical Exam  Vitals reviewed.   Constitutional:       General: He is awake. He is not in acute distress.     Appearance: He is well-developed. He is not toxic-appearing.   HENT:      Head: Normocephalic.      Nose: Nose normal.      Mouth/Throat:      Pharynx: Oropharynx is clear.   Eyes:      Extraocular Movements: Extraocular movements intact.      Pupils: Pupils are equal, round, and reactive to light.   Neck:      Thyroid: No thyroid mass.      Vascular: No carotid bruit.   Cardiovascular:      Rate and Rhythm: Normal rate and regular rhythm.      Pulses: Normal pulses.      Heart sounds: Normal heart sounds. No murmur heard.  Pulmonary:      Effort: Pulmonary effort is normal.      Breath sounds: Normal breath sounds and air entry. No wheezing.   Abdominal:      General: Bowel sounds are normal. There is no distension.      Palpations: Abdomen is soft.      Tenderness: There is no abdominal tenderness.   Musculoskeletal:         General: Signs of injury present. Normal range of motion.      Cervical back: Neck supple. No rigidity.      Comments: Wounds to 1st and 2nd digit left foot that are currently dressed   Skin:     General: Skin is warm.      Coloration: Skin is not jaundiced.      Findings: No lesion.   Neurological:      Mental Status: He  is alert and oriented to person, place, and time.      Cranial Nerves: No cranial nerve deficit.      Motor: Weakness present.      Gait: Gait abnormal.      Comments:  1 over 5  weakness to bilateral lower extremities   Psychiatric:         Attention and Perception: Attention normal.         Mood and Affect: Mood normal.         Behavior: Behavior normal. Behavior is cooperative.         Thought Content: Thought content normal.         Cognition and Memory: Cognition normal.           Significant Labs: All pertinent labs within the past 24 hours have been reviewed.    Significant Imaging: I have reviewed all pertinent imaging results/findings within the past 24 hours.

## 2023-06-20 NOTE — ASSESSMENT & PLAN NOTE
Clean wound vashe  Spray skin barrier around wound  Apply santyl whitney thick to wound bed, cover with vashe moistened drawtex  Cover and secure with 4x4s, hallie, and paper tape  Change daily  Turn every two hours  Low air loss mattress  Keep pressure off wound  TAP system-utilize wedges for repositioning

## 2023-06-21 LAB
GLUCOSE SERPL-MCNC: 100 MG/DL (ref 70–105)
GLUCOSE SERPL-MCNC: 200 MG/DL (ref 70–105)
GLUCOSE SERPL-MCNC: 208 MG/DL (ref 70–105)
GLUCOSE SERPL-MCNC: 98 MG/DL (ref 70–105)

## 2023-06-21 PROCEDURE — 36416 COLLJ CAPILLARY BLOOD SPEC: CPT

## 2023-06-21 PROCEDURE — 82962 GLUCOSE BLOOD TEST: CPT

## 2023-06-21 PROCEDURE — 63600175 PHARM REV CODE 636 W HCPCS: Performed by: FAMILY MEDICINE

## 2023-06-21 PROCEDURE — 25000003 PHARM REV CODE 250: Performed by: INTERNAL MEDICINE

## 2023-06-21 PROCEDURE — 99232 PR SUBSEQUENT HOSPITAL CARE,LEVL II: ICD-10-PCS | Mod: ,,, | Performed by: FAMILY MEDICINE

## 2023-06-21 PROCEDURE — 25000003 PHARM REV CODE 250: Performed by: HOSPITALIST

## 2023-06-21 PROCEDURE — 90935 HEMODIALYSIS ONE EVALUATION: CPT

## 2023-06-21 PROCEDURE — 25000003 PHARM REV CODE 250: Performed by: NURSE PRACTITIONER

## 2023-06-21 PROCEDURE — 63600175 PHARM REV CODE 636 W HCPCS: Performed by: HOSPITALIST

## 2023-06-21 PROCEDURE — 63600175 PHARM REV CODE 636 W HCPCS: Performed by: NURSE PRACTITIONER

## 2023-06-21 PROCEDURE — 99232 SBSQ HOSP IP/OBS MODERATE 35: CPT | Mod: ,,, | Performed by: FAMILY MEDICINE

## 2023-06-21 PROCEDURE — 11000001 HC ACUTE MED/SURG PRIVATE ROOM

## 2023-06-21 PROCEDURE — 96372 THER/PROPH/DIAG INJ SC/IM: CPT

## 2023-06-21 RX ADMIN — CLONIDINE HYDROCHLORIDE 0.1 MG: 0.1 TABLET ORAL at 08:06

## 2023-06-21 RX ADMIN — BUPROPION HYDROCHLORIDE 150 MG: 150 TABLET, FILM COATED, EXTENDED RELEASE ORAL at 09:06

## 2023-06-21 RX ADMIN — SEVELAMER CARBONATE 800 MG: 800 TABLET, FILM COATED ORAL at 06:06

## 2023-06-21 RX ADMIN — CARVEDILOL 25 MG: 25 TABLET, FILM COATED ORAL at 05:06

## 2023-06-21 RX ADMIN — HEPARIN SODIUM 5000 UNITS: 5000 INJECTION INTRAVENOUS; SUBCUTANEOUS at 02:06

## 2023-06-21 RX ADMIN — ISOSORBIDE MONONITRATE 60 MG: 30 TABLET, EXTENDED RELEASE ORAL at 08:06

## 2023-06-21 RX ADMIN — PANTOPRAZOLE SODIUM 40 MG: 40 TABLET, DELAYED RELEASE ORAL at 08:06

## 2023-06-21 RX ADMIN — LACTULOSE 20 G: 20 SOLUTION ORAL at 03:06

## 2023-06-21 RX ADMIN — INSULIN ASPART 1 UNITS: 100 INJECTION, SOLUTION INTRAVENOUS; SUBCUTANEOUS at 09:06

## 2023-06-21 RX ADMIN — FERROUS SULFATE TAB 325 MG (65 MG ELEMENTAL FE) 1 EACH: 325 (65 FE) TAB at 08:06

## 2023-06-21 RX ADMIN — SODIUM CHLORIDE 1000 ML: 9 INJECTION, SOLUTION INTRAVENOUS at 11:06

## 2023-06-21 RX ADMIN — INSULIN ASPART 4 UNITS: 100 INJECTION, SOLUTION INTRAVENOUS; SUBCUTANEOUS at 06:06

## 2023-06-21 RX ADMIN — AMLODIPINE BESYLATE 10 MG: 10 TABLET ORAL at 08:06

## 2023-06-21 RX ADMIN — SEVELAMER CARBONATE 800 MG: 800 TABLET, FILM COATED ORAL at 04:06

## 2023-06-21 RX ADMIN — LACTULOSE 20 G: 20 SOLUTION ORAL at 09:06

## 2023-06-21 RX ADMIN — INSULIN DETEMIR 12 UNITS: 100 INJECTION, SOLUTION SUBCUTANEOUS at 09:06

## 2023-06-21 RX ADMIN — INSULIN ASPART 4 UNITS: 100 INJECTION, SOLUTION INTRAVENOUS; SUBCUTANEOUS at 04:06

## 2023-06-21 RX ADMIN — DOCUSATE SODIUM 100 MG: 100 CAPSULE, LIQUID FILLED ORAL at 09:06

## 2023-06-21 RX ADMIN — DOCUSATE SODIUM 100 MG: 100 CAPSULE, LIQUID FILLED ORAL at 08:06

## 2023-06-21 RX ADMIN — BUPROPION HYDROCHLORIDE 150 MG: 150 TABLET, FILM COATED, EXTENDED RELEASE ORAL at 08:06

## 2023-06-21 RX ADMIN — AMPICILLIN SODIUM 2 G: 2 INJECTION, POWDER, FOR SOLUTION INTRAMUSCULAR; INTRAVENOUS at 04:06

## 2023-06-21 RX ADMIN — CARVEDILOL 25 MG: 25 TABLET, FILM COATED ORAL at 08:06

## 2023-06-21 RX ADMIN — HEPARIN SODIUM 5000 UNITS: 5000 INJECTION INTRAVENOUS; SUBCUTANEOUS at 09:06

## 2023-06-21 RX ADMIN — HEPARIN SODIUM 5000 UNITS: 5000 INJECTION INTRAVENOUS; SUBCUTANEOUS at 06:06

## 2023-06-21 RX ADMIN — LOSARTAN POTASSIUM 100 MG: 100 TABLET, FILM COATED ORAL at 09:06

## 2023-06-21 RX ADMIN — CLONIDINE HYDROCHLORIDE 0.1 MG: 0.1 TABLET ORAL at 02:06

## 2023-06-21 RX ADMIN — CLONIDINE HYDROCHLORIDE 0.1 MG: 0.1 TABLET ORAL at 09:06

## 2023-06-21 NOTE — SUBJECTIVE & OBJECTIVE
Interval History: Feeling well. More engaged today. I had some concerns about worsening depression but he denies.     Review of Systems  Objective:     Vital Signs (Most Recent):  Temp: 99 °F (37.2 °C) (06/21/23 0000)  Pulse: 80 (06/21/23 0000)  Resp: 20 (06/21/23 0000)  BP: (!) 164/85 (06/21/23 0000)  SpO2: 97 % (06/21/23 0000) Vital Signs (24h Range):  Temp:  [97.9 °F (36.6 °C)-99 °F (37.2 °C)] 99 °F (37.2 °C)  Pulse:  [71-80] 80  Resp:  [18-20] 20  SpO2:  [97 %-100 %] 97 %  BP: (132-164)/(45-93) 164/85     Weight: 75.6 kg (166 lb 10.7 oz)  Body mass index is 29.52 kg/m².    Intake/Output Summary (Last 24 hours) at 6/21/2023 0741  Last data filed at 6/21/2023 0540  Gross per 24 hour   Intake 1100 ml   Output --   Net 1100 ml         Physical Exam  Vitals reviewed.   Constitutional:       General: He is awake. He is not in acute distress.     Appearance: He is well-developed. He is not toxic-appearing.   HENT:      Head: Normocephalic.      Nose: Nose normal.      Mouth/Throat:      Pharynx: Oropharynx is clear.   Eyes:      Extraocular Movements: Extraocular movements intact.      Pupils: Pupils are equal, round, and reactive to light.   Neck:      Thyroid: No thyroid mass.      Vascular: No carotid bruit.   Cardiovascular:      Rate and Rhythm: Normal rate and regular rhythm.      Pulses: Normal pulses.      Heart sounds: Normal heart sounds. No murmur heard.  Pulmonary:      Effort: Pulmonary effort is normal.      Breath sounds: Normal breath sounds and air entry. No wheezing.   Abdominal:      General: Bowel sounds are normal. There is no distension.      Palpations: Abdomen is soft.      Tenderness: There is no abdominal tenderness.   Musculoskeletal:         General: Signs of injury present. Normal range of motion.      Cervical back: Neck supple. No rigidity.      Comments: Wounds to 1st and 2nd digit left foot that are currently dressed   Skin:     General: Skin is warm.      Coloration: Skin is not  jaundiced.      Findings: No lesion.   Neurological:      Mental Status: He is alert and oriented to person, place, and time.      Cranial Nerves: No cranial nerve deficit.      Motor: Weakness present.      Gait: Gait abnormal.      Comments:  1 over 5  weakness to bilateral lower extremities   Psychiatric:         Attention and Perception: Attention normal.         Mood and Affect: Mood normal.         Behavior: Behavior normal. Behavior is cooperative.         Thought Content: Thought content normal.         Cognition and Memory: Cognition normal.           Significant Labs: All pertinent labs within the past 24 hours have been reviewed.    Significant Imaging: I have reviewed all pertinent imaging results/findings within the past 24 hours.

## 2023-06-21 NOTE — DIALYSIS ROUNDING
Mr. Escobar is seen during his dialysis. He's stable today and w/o complaint. Plan is to continue with current dialysis schedule.

## 2023-06-21 NOTE — DIALYSIS ROUNDING
"Removed 1 Liter, treatment was complicated by low blood pressures and nausea, patient would state "I feel bad at times during treatment.  "
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
## 2023-06-21 NOTE — PROGRESS NOTES
[facility]  Nephrology  Progress Note    Patient Name: Lee Escobar  MRN: [id number]  Admission Date: 5/26/2023  Hospital Length of Stay: 25 days  Attending Provider: Macho Fox Jr., MD   Primary Care Physician: Maria Elena Solorio II, MD  Principal Problem:Osteomyelitis of left foot    Consults  Subjective:     Interval History: The patient has no complaints today    Review of patient's allergies indicates:  No Known Allergies  Current Facility-Administered Medications   Medication Frequency    0.9%  NaCl infusion PRN    acetaminophen tablet 650 mg Q4H PRN    ALPRAZolam tablet 0.5 mg TID PRN    amLODIPine tablet 10 mg Daily    ampicillin (OMNIPEN) 2 g in sodium chloride 0.9 % 100 mL IVPB (MB+) Q12H    buPROPion TBSR 12 hr tablet 150 mg BID    carvediloL tablet 25 mg BID WM    cloNIDine tablet 0.1 mg TID    dextrose 50% injection 12.5 g PRN    dextrose 50% injection 25 g PRN    docusate sodium capsule 100 mg BID    ferrous sulfate tablet 1 each Daily    glucagon (human recombinant) injection 1 mg PRN    glucose chewable tablet 16 g PRN    glucose chewable tablet 24 g PRN    heparin (porcine) injection 5,000 Units Q8H    hydrALAZINE tablet 25 mg Q8H PRN    insulin aspart U-100 injection 0-5 Units QID (AC + HS) PRN    insulin aspart U-100 injection 4 Units TIDWM    insulin detemir U-100 injection 12 Units QHS    isosorbide mononitrate 24 hr tablet 60 mg Daily    labetaloL injection 10 mg Q4H PRN    lactulose 20 gram/30 mL solution Soln 20 g TID    linaCLOtide capsule 145 mcg Daily PRN    losartan tablet 100 mg QHS    melatonin tablet 6 mg Nightly PRN    naloxone 0.4 mg/mL injection 0.02 mg PRN    ondansetron injection 4 mg Q8H PRN    pantoprazole EC tablet 40 mg Daily    sevelamer carbonate tablet 800 mg TIDWM    silver sulfADIAZINE 1% cream Daily PRN    sodium chloride 0.9% flush 10 mL Q12H PRN       Objective:     Vital Signs (Most Recent):  Temp: 97.9 °F (36.6 °C) (06/20/23  1949)  Pulse: 73 (06/20/23 1949)  Resp: 20 (06/20/23 1949)  BP: (!) 132/93 (nurse aware) (06/20/23 1949)  SpO2: 100 % (06/20/23 1949) Vital Signs (24h Range):  Temp:  [97.9 °F (36.6 °C)-99.2 °F (37.3 °C)] 97.9 °F (36.6 °C)  Pulse:  [71-77] 73  Resp:  [18-20] 20  SpO2:  [98 %-100 %] 100 %  BP: (132-192)/(45-93) 132/93     Weight: 71.7 kg (158 lb 0.8 oz) (06/19/23 0500)  Body mass index is 28 kg/m².  Body surface area is 1.79 meters squared.    I/O last 3 completed shifts:  In: 1220 [P.O.:1020; IV Piggyback:200]  Out: -     Physical Exam  Lungs-clear  Cor-2/6 systolic murmur  Abd-soft    Significant Labs:sureCBC: No results for input(s): WBC, RBC, HGB, HCT, PLT, MCV, MCH, MCHC in the last 168 hours.  CMP:   Recent Labs   Lab 06/19/23  0457   *   CALCIUM 8.6   ALBUMIN 2.3*      K 5.4*   CO2 26   CL 97*   *   CREATININE 7.40*     All labs within the past 24 hours have been reviewed.    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]  Not Applicable     Chronic    Diabetic ulcer of toe of left foot associated with type 2 diabetes mellitus, with bone involvement without evidence of necrosis [E11.621, L97.526] 05/31/2023 Yes    Pressure injury of left heel, unstageable [L89.620] 05/31/2023 Yes    Pressure injury of right buttock, stage 2 [L89.312] 05/31/2023 Yes    HTN (hypertension) [I10] 05/25/2023 Yes     Chronic    Constipation [K59.00] 05/25/2023 Yes     Chronic    ESRD (end stage renal disease) [N18.6] 05/25/2023 Yes    Wound infection [T14.8XXA, L08.9] 05/25/2023 Yes    Major depression [F32.9] 05/25/2023 Yes      Problems Resolved During this Admission:    Diagnosis Date Noted Date Resolved POA    Right flank pain [R10.9] 06/16/2023 06/18/2023 Yes    Type 2 diabetes mellitus with kidney complication, with long-term current use  of insulin [E11.29, Z79.4] 05/25/2023 06/18/2023 Not Applicable     Chronic       Plan:  Hemodialysis tomorrow    Thank you for your consult. I will follow-up with patient. Please contact us if you have any additional questions.    Danny Peter MD  Nephrology  [facility]

## 2023-06-21 NOTE — PROGRESS NOTES
Wound care note;  Patient skin was evaluated today  Patient in bed, Alert.   VINOD Mendoza  FNP assessed wounds to Right ischial and Left heel. See her notes. Had a bedside debridement today per Rosas ABEL.   Has area of partial thickness skin loss to Left ischial . Applied Optifoam Foam border  Left heel dry and with out pressure injury noted.   Cont turn protocol  Waffle boots to heels   On redistribution mattress for comfort and support   Wound care team to follow

## 2023-06-21 NOTE — ASSESSMENT & PLAN NOTE
1st and 2nd toe Left foot (acute) Osteomyelitis. Complicated by moderate vascular disease of Lower extremities.  CTA Bilateral lower extremities reveals moderate to severe calcified vascular disease affects the arteries of the bilateral lower extremities.    -medical management with Vancomycin and cefepime started on 5/25/23  -wound care consulted    5/29: may require biopsy and debridement by general surgery.  Per their last note, follow-up will be this week.      5/30: Wound culture with Proteus and Enterococcus both sensitive to ampicillin, with adjust therapy    Antibiotics (From admission, onward)    Start     Stop Route Frequency Ordered    06/10/23 1700  ampicillin (OMNIPEN) 2 g in sodium chloride 0.9 % 100 mL IVPB (MB+)         07/07 1559 IV Every 12 hours 06/10/23 1650    06/09/23 1526  silver sulfADIAZINE 1% cream         -- Top Daily PRN 06/09/23 1431    05/29/23 1130  mupirocin 2 % ointment         06/03 0859 Nasl 2 times daily 05/29/23 1028        6/19 - Course of abx continues.     6/20 - Wound care notes reviewed, continue abx and wound care.

## 2023-06-22 LAB
GLUCOSE SERPL-MCNC: 129 MG/DL (ref 70–105)
GLUCOSE SERPL-MCNC: 153 MG/DL (ref 70–105)
GLUCOSE SERPL-MCNC: 83 MG/DL (ref 70–105)

## 2023-06-22 PROCEDURE — 99232 SBSQ HOSP IP/OBS MODERATE 35: CPT | Mod: ,,, | Performed by: FAMILY MEDICINE

## 2023-06-22 PROCEDURE — 25000003 PHARM REV CODE 250: Performed by: HOSPITALIST

## 2023-06-22 PROCEDURE — 82962 GLUCOSE BLOOD TEST: CPT

## 2023-06-22 PROCEDURE — 25000003 PHARM REV CODE 250: Performed by: NURSE PRACTITIONER

## 2023-06-22 PROCEDURE — 63600175 PHARM REV CODE 636 W HCPCS: Performed by: NURSE PRACTITIONER

## 2023-06-22 PROCEDURE — 63600175 PHARM REV CODE 636 W HCPCS: Performed by: HOSPITALIST

## 2023-06-22 PROCEDURE — 25000003 PHARM REV CODE 250

## 2023-06-22 PROCEDURE — 11000001 HC ACUTE MED/SURG PRIVATE ROOM

## 2023-06-22 PROCEDURE — 99232 PR SUBSEQUENT HOSPITAL CARE,LEVL II: ICD-10-PCS | Mod: ,,, | Performed by: FAMILY MEDICINE

## 2023-06-22 RX ADMIN — ISOSORBIDE MONONITRATE 60 MG: 30 TABLET, EXTENDED RELEASE ORAL at 08:06

## 2023-06-22 RX ADMIN — FERROUS SULFATE TAB 325 MG (65 MG ELEMENTAL FE) 1 EACH: 325 (65 FE) TAB at 08:06

## 2023-06-22 RX ADMIN — PANTOPRAZOLE SODIUM 40 MG: 40 TABLET, DELAYED RELEASE ORAL at 08:06

## 2023-06-22 RX ADMIN — SEVELAMER CARBONATE 800 MG: 800 TABLET, FILM COATED ORAL at 05:06

## 2023-06-22 RX ADMIN — CLONIDINE HYDROCHLORIDE 0.1 MG: 0.1 TABLET ORAL at 09:06

## 2023-06-22 RX ADMIN — SEVELAMER CARBONATE 800 MG: 800 TABLET, FILM COATED ORAL at 12:06

## 2023-06-22 RX ADMIN — CARVEDILOL 25 MG: 25 TABLET, FILM COATED ORAL at 08:06

## 2023-06-22 RX ADMIN — AMPICILLIN SODIUM 2 G: 2 INJECTION, POWDER, FOR SOLUTION INTRAMUSCULAR; INTRAVENOUS at 05:06

## 2023-06-22 RX ADMIN — LABETALOL HYDROCHLORIDE 10 MG: 5 INJECTION, SOLUTION INTRAVENOUS at 03:06

## 2023-06-22 RX ADMIN — INSULIN DETEMIR 12 UNITS: 100 INJECTION, SOLUTION SUBCUTANEOUS at 09:06

## 2023-06-22 RX ADMIN — HEPARIN SODIUM 5000 UNITS: 5000 INJECTION INTRAVENOUS; SUBCUTANEOUS at 06:06

## 2023-06-22 RX ADMIN — LOSARTAN POTASSIUM 100 MG: 100 TABLET, FILM COATED ORAL at 09:06

## 2023-06-22 RX ADMIN — HEPARIN SODIUM 5000 UNITS: 5000 INJECTION INTRAVENOUS; SUBCUTANEOUS at 09:06

## 2023-06-22 RX ADMIN — DOCUSATE SODIUM 100 MG: 100 CAPSULE, LIQUID FILLED ORAL at 08:06

## 2023-06-22 RX ADMIN — AMLODIPINE BESYLATE 10 MG: 10 TABLET ORAL at 08:06

## 2023-06-22 RX ADMIN — AMPICILLIN SODIUM 2 G: 2 INJECTION, POWDER, FOR SOLUTION INTRAMUSCULAR; INTRAVENOUS at 03:06

## 2023-06-22 RX ADMIN — BUPROPION HYDROCHLORIDE 150 MG: 150 TABLET, FILM COATED, EXTENDED RELEASE ORAL at 08:06

## 2023-06-22 RX ADMIN — CARVEDILOL 25 MG: 25 TABLET, FILM COATED ORAL at 05:06

## 2023-06-22 RX ADMIN — SILVER SULFADIAZINE: 10 CREAM TOPICAL at 04:06

## 2023-06-22 RX ADMIN — HEPARIN SODIUM 5000 UNITS: 5000 INJECTION INTRAVENOUS; SUBCUTANEOUS at 02:06

## 2023-06-22 RX ADMIN — CLONIDINE HYDROCHLORIDE 0.1 MG: 0.1 TABLET ORAL at 02:06

## 2023-06-22 RX ADMIN — LACTULOSE 20 G: 20 SOLUTION ORAL at 08:06

## 2023-06-22 RX ADMIN — BUPROPION HYDROCHLORIDE 150 MG: 150 TABLET, FILM COATED, EXTENDED RELEASE ORAL at 09:06

## 2023-06-22 RX ADMIN — ONDANSETRON HYDROCHLORIDE 4 MG: 2 SOLUTION INTRAMUSCULAR; INTRAVENOUS at 10:06

## 2023-06-22 RX ADMIN — CLONIDINE HYDROCHLORIDE 0.1 MG: 0.1 TABLET ORAL at 08:06

## 2023-06-22 RX ADMIN — DOCUSATE SODIUM 100 MG: 100 CAPSULE, LIQUID FILLED ORAL at 09:06

## 2023-06-22 RX ADMIN — INSULIN ASPART 4 UNITS: 100 INJECTION, SOLUTION INTRAVENOUS; SUBCUTANEOUS at 06:06

## 2023-06-22 RX ADMIN — LACTULOSE 20 G: 20 SOLUTION ORAL at 02:06

## 2023-06-22 RX ADMIN — SEVELAMER CARBONATE 800 MG: 800 TABLET, FILM COATED ORAL at 06:06

## 2023-06-22 NOTE — PLAN OF CARE
SS discussed with Dr. Fox, patient may be able to finish IV abx at the nursing home. IV abx stop date is 7/6/23. MD is going to discuss with wound care NP, Anahi. [facility] is [facility]. SS spoke with Rosina at [facility], she states they can accommodate patient on IV abx BID. She states they will accept patient back when he is ready for dc.
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
## 2023-06-22 NOTE — SUBJECTIVE & OBJECTIVE
Interval History: No complaints. Eating well.     Review of Systems  Objective:     Vital Signs (Most Recent):  Temp: 98.6 °F (37 °C) (06/22/23 0318)  Pulse: 85 (06/22/23 0318)  Resp: 20 (06/22/23 0318)  BP: (!) 158/74 (06/22/23 0347)  SpO2: 100 % (06/22/23 0318) Vital Signs (24h Range):  Temp:  [98.3 °F (36.8 °C)-99 °F (37.2 °C)] 98.6 °F (37 °C)  Pulse:  [66-85] 85  Resp:  [18-20] 20  SpO2:  [98 %-100 %] 100 %  BP: ()/(32-91) 158/74     Weight: 74.8 kg (165 lb)  Body mass index is 29.23 kg/m².    Intake/Output Summary (Last 24 hours) at 6/22/2023 0734  Last data filed at 6/22/2023 0140  Gross per 24 hour   Intake 360 ml   Output --   Net 360 ml         Physical Exam  Vitals reviewed.   Constitutional:       General: He is awake. He is not in acute distress.     Appearance: He is well-developed. He is not toxic-appearing.   HENT:      Head: Normocephalic.      Nose: Nose normal.      Mouth/Throat:      Pharynx: Oropharynx is clear.   Eyes:      Extraocular Movements: Extraocular movements intact.      Pupils: Pupils are equal, round, and reactive to light.   Neck:      Thyroid: No thyroid mass.      Vascular: No carotid bruit.   Cardiovascular:      Rate and Rhythm: Normal rate and regular rhythm.      Pulses: Normal pulses.      Heart sounds: Normal heart sounds. No murmur heard.  Pulmonary:      Effort: Pulmonary effort is normal.      Breath sounds: Normal breath sounds and air entry. No wheezing.   Abdominal:      General: Bowel sounds are normal. There is no distension.      Palpations: Abdomen is soft.      Tenderness: There is no abdominal tenderness.   Musculoskeletal:         General: Signs of injury present. Normal range of motion.      Cervical back: Neck supple. No rigidity.      Comments: Wounds to 1st and 2nd digit left foot that are currently dressed   Skin:     General: Skin is warm.      Coloration: Skin is not jaundiced.      Findings: No lesion.   Neurological:      Mental Status: He is  alert and oriented to person, place, and time.      Cranial Nerves: No cranial nerve deficit.      Motor: Weakness present.      Gait: Gait abnormal.      Comments:  1 over 5  weakness to bilateral lower extremities   Psychiatric:         Attention and Perception: Attention normal.         Mood and Affect: Mood normal.         Behavior: Behavior normal. Behavior is cooperative.         Thought Content: Thought content normal.         Cognition and Memory: Cognition normal.           Significant Labs: All pertinent labs within the past 24 hours have been reviewed.    Significant Imaging: I have reviewed all pertinent imaging results/findings within the past 24 hours.

## 2023-06-23 LAB
GLUCOSE SERPL-MCNC: 139 MG/DL (ref 70–105)
GLUCOSE SERPL-MCNC: 89 MG/DL (ref 70–105)
GLUCOSE SERPL-MCNC: 91 MG/DL (ref 70–105)
GLUCOSE SERPL-MCNC: 98 MG/DL (ref 70–105)

## 2023-06-23 PROCEDURE — 82962 GLUCOSE BLOOD TEST: CPT

## 2023-06-23 PROCEDURE — 25000003 PHARM REV CODE 250: Performed by: HOSPITALIST

## 2023-06-23 PROCEDURE — 99232 PR SUBSEQUENT HOSPITAL CARE,LEVL II: ICD-10-PCS | Mod: ,,, | Performed by: FAMILY MEDICINE

## 2023-06-23 PROCEDURE — 11000001 HC ACUTE MED/SURG PRIVATE ROOM

## 2023-06-23 PROCEDURE — 99232 SBSQ HOSP IP/OBS MODERATE 35: CPT | Mod: ,,, | Performed by: FAMILY MEDICINE

## 2023-06-23 PROCEDURE — 63600175 PHARM REV CODE 636 W HCPCS: Performed by: HOSPITALIST

## 2023-06-23 PROCEDURE — 25000003 PHARM REV CODE 250: Performed by: NURSE PRACTITIONER

## 2023-06-23 PROCEDURE — 63600175 PHARM REV CODE 636 W HCPCS: Performed by: NURSE PRACTITIONER

## 2023-06-23 PROCEDURE — 25000003 PHARM REV CODE 250: Performed by: INTERNAL MEDICINE

## 2023-06-23 PROCEDURE — 90935 HEMODIALYSIS ONE EVALUATION: CPT

## 2023-06-23 RX ADMIN — DOCUSATE SODIUM 100 MG: 100 CAPSULE, LIQUID FILLED ORAL at 09:06

## 2023-06-23 RX ADMIN — INSULIN DETEMIR 12 UNITS: 100 INJECTION, SOLUTION SUBCUTANEOUS at 09:06

## 2023-06-23 RX ADMIN — SEVELAMER CARBONATE 800 MG: 800 TABLET, FILM COATED ORAL at 06:06

## 2023-06-23 RX ADMIN — FERROUS SULFATE TAB 325 MG (65 MG ELEMENTAL FE) 1 EACH: 325 (65 FE) TAB at 09:06

## 2023-06-23 RX ADMIN — HEPARIN SODIUM 5000 UNITS: 5000 INJECTION INTRAVENOUS; SUBCUTANEOUS at 06:06

## 2023-06-23 RX ADMIN — SODIUM CHLORIDE: 9 INJECTION, SOLUTION INTRAVENOUS at 09:06

## 2023-06-23 RX ADMIN — HEPARIN SODIUM 5000 UNITS: 5000 INJECTION INTRAVENOUS; SUBCUTANEOUS at 02:06

## 2023-06-23 RX ADMIN — AMPICILLIN SODIUM 2 G: 2 INJECTION, POWDER, FOR SOLUTION INTRAMUSCULAR; INTRAVENOUS at 04:06

## 2023-06-23 RX ADMIN — BUPROPION HYDROCHLORIDE 150 MG: 150 TABLET, FILM COATED, EXTENDED RELEASE ORAL at 09:06

## 2023-06-23 RX ADMIN — CLONIDINE HYDROCHLORIDE 0.1 MG: 0.1 TABLET ORAL at 09:06

## 2023-06-23 RX ADMIN — LOSARTAN POTASSIUM 100 MG: 100 TABLET, FILM COATED ORAL at 09:06

## 2023-06-23 RX ADMIN — SEVELAMER CARBONATE 800 MG: 800 TABLET, FILM COATED ORAL at 12:06

## 2023-06-23 RX ADMIN — HEPARIN SODIUM 5000 UNITS: 5000 INJECTION INTRAVENOUS; SUBCUTANEOUS at 09:06

## 2023-06-23 RX ADMIN — AMLODIPINE BESYLATE 10 MG: 10 TABLET ORAL at 09:06

## 2023-06-23 RX ADMIN — CARVEDILOL 25 MG: 25 TABLET, FILM COATED ORAL at 04:06

## 2023-06-23 RX ADMIN — SEVELAMER CARBONATE 800 MG: 800 TABLET, FILM COATED ORAL at 04:06

## 2023-06-23 RX ADMIN — PANTOPRAZOLE SODIUM 40 MG: 40 TABLET, DELAYED RELEASE ORAL at 09:06

## 2023-06-23 RX ADMIN — ISOSORBIDE MONONITRATE 60 MG: 30 TABLET, EXTENDED RELEASE ORAL at 09:06

## 2023-06-23 RX ADMIN — CLONIDINE HYDROCHLORIDE 0.1 MG: 0.1 TABLET ORAL at 02:06

## 2023-06-23 RX ADMIN — INSULIN ASPART 4 UNITS: 100 INJECTION, SOLUTION INTRAVENOUS; SUBCUTANEOUS at 12:06

## 2023-06-23 RX ADMIN — CARVEDILOL 25 MG: 25 TABLET, FILM COATED ORAL at 09:06

## 2023-06-23 RX ADMIN — LACTULOSE 20 G: 20 SOLUTION ORAL at 09:06

## 2023-06-23 RX ADMIN — LACTULOSE 20 G: 20 SOLUTION ORAL at 02:06

## 2023-06-23 NOTE — DIALYSIS ROUNDING
"          Nephrology Department            NAME: Lee Escobar   YOB: 1985  MRN: [id number]  NOTE DATE: 06/23/2023       Seen in Dialysis Unit.  He is tolerating the procedure.    Patient complains:  None.      REVIEW OF SYSTEMS:  he reports no shortness of breath, nausea or vomiting. No acute changes.    VITALS:  height is 5' 3" (1.6 m) and weight is 74.8 kg (165 lb). His temperature is 98.2 °F (36.8 °C). His blood pressure is 168/97 (abnormal) and his pulse is 73. His respiration is 20 and oxygen saturation is 98%.  Hemodialysis documentation flowsheet reviewed with dialysis nurse, including weight and vitals.    Resting comfortably, NAD.  Respiration unlabored. Lungs clear.  Heart regular, no rub.  Abdomen soft, nontender, positive bowl sounds  No edema.    Recent Labs   Lab 06/17/23  0436 06/19/23  0457    136   K 4.5 5.4*   CL 99 97*   CO2 29 26   BUN 61* 119*   CREATININE 4.83* 7.40*   CALCIUM 8.7 8.6   PHOS 4.1 4.9*     No results for input(s): WBC, HGB, HCT, PLT in the last 168 hours.    ASSESSMENT/PLAN:  Continue with scheduled hemodialysis for this patient.    Fox Brown Jr, MD   "
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
## 2023-06-23 NOTE — DIALYSIS ROUNDING
Pt tolerated HD tx well. Net amount of 2 Liters removed. Bruit/thrill noted post tx. Pt dialyzed a total of 3 hrs.

## 2023-06-23 NOTE — SUBJECTIVE & OBJECTIVE
Interval History: Patient is generally feeling ok.  Tolerating HD.     Review of Systems  Objective:     Vital Signs (Most Recent):  Temp: 97.5 °F (36.4 °C) (06/23/23 0400)  Pulse: 67 (06/23/23 0400)  Resp: 18 (06/23/23 0400)  BP: (!) 152/81 (06/23/23 0400)  SpO2: 100 % (06/23/23 0400) Vital Signs (24h Range):  Temp:  [97.5 °F (36.4 °C)-98.4 °F (36.9 °C)] 97.5 °F (36.4 °C)  Pulse:  [67-74] 67  Resp:  [16-20] 18  SpO2:  [96 %-100 %] 100 %  BP: (142-183)/(54-81) 152/81     Weight: 74.8 kg (165 lb)  Body mass index is 29.23 kg/m².    Intake/Output Summary (Last 24 hours) at 6/23/2023 0732  Last data filed at 6/23/2023 0559  Gross per 24 hour   Intake 200 ml   Output --   Net 200 ml         Physical Exam  Vitals reviewed.   Constitutional:       General: He is awake. He is not in acute distress.     Appearance: He is well-developed. He is not toxic-appearing.   HENT:      Head: Normocephalic.      Nose: Nose normal.      Mouth/Throat:      Pharynx: Oropharynx is clear.   Eyes:      Extraocular Movements: Extraocular movements intact.      Pupils: Pupils are equal, round, and reactive to light.   Neck:      Thyroid: No thyroid mass.      Vascular: No carotid bruit.   Cardiovascular:      Rate and Rhythm: Normal rate and regular rhythm.      Pulses: Normal pulses.      Heart sounds: Normal heart sounds. No murmur heard.  Pulmonary:      Effort: Pulmonary effort is normal.      Breath sounds: Normal breath sounds and air entry. No wheezing.   Abdominal:      General: Bowel sounds are normal. There is no distension.      Palpations: Abdomen is soft.      Tenderness: There is no abdominal tenderness.   Musculoskeletal:         General: Signs of injury present. Normal range of motion.      Cervical back: Neck supple. No rigidity.      Comments: Wounds to 1st and 2nd digit left foot that are currently dressed   Skin:     General: Skin is warm.      Coloration: Skin is not jaundiced.      Findings: No lesion.   Neurological:       Mental Status: He is alert and oriented to person, place, and time.      Cranial Nerves: No cranial nerve deficit.      Motor: Weakness present.      Gait: Gait abnormal.      Comments:  1 over 5  weakness to bilateral lower extremities   Psychiatric:         Attention and Perception: Attention normal.         Mood and Affect: Mood normal.         Behavior: Behavior normal. Behavior is cooperative.         Thought Content: Thought content normal.         Cognition and Memory: Cognition normal.           Significant Labs: All pertinent labs within the past 24 hours have been reviewed.    Significant Imaging: I have reviewed all pertinent imaging results/findings within the past 24 hours.

## 2023-06-23 NOTE — PROGRESS NOTES
[facility]  Nephrology  Progress Note    Patient Name: Lee Escobar  MRN: [id number]  Admission Date: 5/26/2023  Hospital Length of Stay: 27 days  Attending Provider: Macho Fox Jr., MD   Primary Care Physician: Maria Elena Solorio II, MD  Principal Problem:Osteomyelitis of left foot    Consults  Subjective:     Interval History: The patient has no complaints today    Review of patient's allergies indicates:  No Known Allergies  Current Facility-Administered Medications   Medication Frequency    0.9%  NaCl infusion PRN    acetaminophen tablet 650 mg Q4H PRN    ALPRAZolam tablet 0.5 mg TID PRN    amLODIPine tablet 10 mg Daily    ampicillin (OMNIPEN) 2 g in sodium chloride 0.9 % 100 mL IVPB (MB+) Q12H    buPROPion TBSR 12 hr tablet 150 mg BID    carvediloL tablet 25 mg BID WM    cloNIDine tablet 0.1 mg TID    dextrose 50% injection 12.5 g PRN    dextrose 50% injection 25 g PRN    docusate sodium capsule 100 mg BID    ferrous sulfate tablet 1 each Daily    glucagon (human recombinant) injection 1 mg PRN    glucose chewable tablet 16 g PRN    glucose chewable tablet 24 g PRN    heparin (porcine) injection 5,000 Units Q8H    hydrALAZINE tablet 25 mg Q8H PRN    insulin aspart U-100 injection 0-5 Units QID (AC + HS) PRN    insulin aspart U-100 injection 4 Units TIDWM    insulin detemir U-100 injection 12 Units QHS    isosorbide mononitrate 24 hr tablet 60 mg Daily    labetaloL injection 10 mg Q4H PRN    lactulose 20 gram/30 mL solution Soln 20 g TID    linaCLOtide capsule 145 mcg Daily PRN    losartan tablet 100 mg QHS    melatonin tablet 6 mg Nightly PRN    naloxone 0.4 mg/mL injection 0.02 mg PRN    ondansetron injection 4 mg Q8H PRN    pantoprazole EC tablet 40 mg Daily    sevelamer carbonate tablet 800 mg TIDWM    silver sulfADIAZINE 1% cream Daily PRN    sodium chloride 0.9% flush 10 mL Q12H PRN       Objective:     Vital Signs (Most Recent):  Temp: 98.3 °F (36.8 °C) (06/22/23  1645)  Pulse: 70 (06/22/23 1645)  Resp: 20 (06/22/23 1645)  BP: (!) 164/62 (06/22/23 1645)  SpO2: 96 % (06/22/23 1645) Vital Signs (24h Range):  Temp:  [98.2 °F (36.8 °C)-98.6 °F (37 °C)] 98.3 °F (36.8 °C)  Pulse:  [70-85] 70  Resp:  [16-20] 20  SpO2:  [96 %-100 %] 96 %  BP: (155-194)/(54-91) 164/62     Weight: 74.8 kg (165 lb) (06/22/23 0557)  Body mass index is 29.23 kg/m².  Body surface area is 1.82 meters squared.    I/O last 3 completed shifts:  In: 460 [P.O.:360; IV Piggyback:100]  Out: -     Physical Exam  Lungs-clear  Cor-2/6 systolic murmur no rub  Abd-soft    Significant Labs:sureCBC: No results for input(s): WBC, RBC, HGB, HCT, PLT, MCV, MCH, MCHC in the last 168 hours.  CMP:   Recent Labs   Lab 06/19/23  0457   *   CALCIUM 8.6   ALBUMIN 2.3*      K 5.4*   CO2 26   CL 97*   *   CREATININE 7.40*     All labs within the past 24 hours have been reviewed.    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]  Not Applicable     Chronic    Diabetic ulcer of toe of left foot associated with type 2 diabetes mellitus, with bone involvement without evidence of necrosis [E11.621, L97.526] 05/31/2023 Yes    Pressure injury of left heel, unstageable [L89.620] 05/31/2023 Yes    Pressure injury of right buttock, stage 2 [L89.312] 05/31/2023 Yes    HTN (hypertension) [I10] 05/25/2023 Yes     Chronic    Constipation [K59.00] 05/25/2023 Yes     Chronic    ESRD (end stage renal disease) [N18.6] 05/25/2023 Yes    Wound infection [T14.8XXA, L08.9] 05/25/2023 Yes    Major depression [F32.9] 05/25/2023 Yes      Problems Resolved During this Admission:    Diagnosis Date Noted Date Resolved POA    Right flank pain [R10.9] 06/16/2023 06/18/2023 Yes    Type 2 diabetes mellitus with kidney complication, with long-term current use of insulin  [E11.29, Z79.4] 05/25/2023 06/18/2023 Not Applicable     Chronic       Plan:  Hemodialysis tomorrow    Thank you for your consult. I will follow-up with patient. Please contact us if you have any additional questions.    Danny Peter MD  Nephrology  [facility]

## 2023-06-24 LAB
GLUCOSE SERPL-MCNC: 100 MG/DL (ref 70–105)
GLUCOSE SERPL-MCNC: 132 MG/DL (ref 70–105)
GLUCOSE SERPL-MCNC: 144 MG/DL (ref 70–105)
GLUCOSE SERPL-MCNC: 82 MG/DL (ref 70–105)

## 2023-06-24 PROCEDURE — 25000003 PHARM REV CODE 250: Performed by: HOSPITALIST

## 2023-06-24 PROCEDURE — 63600175 PHARM REV CODE 636 W HCPCS: Performed by: HOSPITALIST

## 2023-06-24 PROCEDURE — 11000001 HC ACUTE MED/SURG PRIVATE ROOM

## 2023-06-24 PROCEDURE — 25000003 PHARM REV CODE 250: Performed by: NURSE PRACTITIONER

## 2023-06-24 PROCEDURE — 82962 GLUCOSE BLOOD TEST: CPT

## 2023-06-24 PROCEDURE — 99232 SBSQ HOSP IP/OBS MODERATE 35: CPT | Mod: ,,, | Performed by: FAMILY MEDICINE

## 2023-06-24 PROCEDURE — 63600175 PHARM REV CODE 636 W HCPCS: Performed by: NURSE PRACTITIONER

## 2023-06-24 PROCEDURE — 99232 PR SUBSEQUENT HOSPITAL CARE,LEVL II: ICD-10-PCS | Mod: ,,, | Performed by: FAMILY MEDICINE

## 2023-06-24 RX ADMIN — INSULIN DETEMIR 12 UNITS: 100 INJECTION, SOLUTION SUBCUTANEOUS at 09:06

## 2023-06-24 RX ADMIN — LOSARTAN POTASSIUM 100 MG: 100 TABLET, FILM COATED ORAL at 09:06

## 2023-06-24 RX ADMIN — CARVEDILOL 25 MG: 25 TABLET, FILM COATED ORAL at 08:06

## 2023-06-24 RX ADMIN — BUPROPION HYDROCHLORIDE 150 MG: 150 TABLET, FILM COATED, EXTENDED RELEASE ORAL at 08:06

## 2023-06-24 RX ADMIN — DOCUSATE SODIUM 100 MG: 100 CAPSULE, LIQUID FILLED ORAL at 09:06

## 2023-06-24 RX ADMIN — AMPICILLIN SODIUM 2 G: 2 INJECTION, POWDER, FOR SOLUTION INTRAMUSCULAR; INTRAVENOUS at 04:06

## 2023-06-24 RX ADMIN — SEVELAMER CARBONATE 800 MG: 800 TABLET, FILM COATED ORAL at 12:06

## 2023-06-24 RX ADMIN — HEPARIN SODIUM 5000 UNITS: 5000 INJECTION INTRAVENOUS; SUBCUTANEOUS at 02:06

## 2023-06-24 RX ADMIN — AMLODIPINE BESYLATE 10 MG: 10 TABLET ORAL at 08:06

## 2023-06-24 RX ADMIN — HYDRALAZINE HYDROCHLORIDE 25 MG: 25 TABLET ORAL at 12:06

## 2023-06-24 RX ADMIN — CLONIDINE HYDROCHLORIDE 0.1 MG: 0.1 TABLET ORAL at 02:06

## 2023-06-24 RX ADMIN — ISOSORBIDE MONONITRATE 60 MG: 30 TABLET, EXTENDED RELEASE ORAL at 08:06

## 2023-06-24 RX ADMIN — SEVELAMER CARBONATE 800 MG: 800 TABLET, FILM COATED ORAL at 05:06

## 2023-06-24 RX ADMIN — LACTULOSE 20 G: 20 SOLUTION ORAL at 02:06

## 2023-06-24 RX ADMIN — HEPARIN SODIUM 5000 UNITS: 5000 INJECTION INTRAVENOUS; SUBCUTANEOUS at 09:06

## 2023-06-24 RX ADMIN — FERROUS SULFATE TAB 325 MG (65 MG ELEMENTAL FE) 1 EACH: 325 (65 FE) TAB at 08:06

## 2023-06-24 RX ADMIN — CLONIDINE HYDROCHLORIDE 0.1 MG: 0.1 TABLET ORAL at 09:06

## 2023-06-24 RX ADMIN — SEVELAMER CARBONATE 800 MG: 800 TABLET, FILM COATED ORAL at 04:06

## 2023-06-24 RX ADMIN — CLONIDINE HYDROCHLORIDE 0.1 MG: 0.1 TABLET ORAL at 08:06

## 2023-06-24 RX ADMIN — BUPROPION HYDROCHLORIDE 150 MG: 150 TABLET, FILM COATED, EXTENDED RELEASE ORAL at 09:06

## 2023-06-24 RX ADMIN — PANTOPRAZOLE SODIUM 40 MG: 40 TABLET, DELAYED RELEASE ORAL at 08:06

## 2023-06-24 RX ADMIN — CARVEDILOL 25 MG: 25 TABLET, FILM COATED ORAL at 04:06

## 2023-06-24 RX ADMIN — HEPARIN SODIUM 5000 UNITS: 5000 INJECTION INTRAVENOUS; SUBCUTANEOUS at 05:06
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
## 2023-06-24 NOTE — PLAN OF CARE
Problem: Adult Inpatient Plan of Care  Goal: Plan of Care Review  Outcome: Ongoing, Progressing     Problem: Adult Inpatient Plan of Care  Goal: Patient-Specific Goal (Individualized)  Outcome: Ongoing, Progressing     Problem: Adult Inpatient Plan of Care  Goal: Absence of Hospital-Acquired Illness or Injury  Outcome: Ongoing, Progressing     Problem: Adult Inpatient Plan of Care  Goal: Optimal Comfort and Wellbeing  Outcome: Ongoing, Progressing     Problem: Impaired Wound Healing  Goal: Optimal Wound Healing  Outcome: Ongoing, Progressing

## 2023-06-24 NOTE — PROGRESS NOTES
[facility]  Nephrology  Progress Note    Patient Name: Lee Escobar  MRN: [id number]  Admission Date: 5/26/2023  Hospital Length of Stay: 29 days  Attending Provider: Macho Fox Jr., MD   Primary Care Physician: Maria Elena Solorio II, MD  Principal Problem:Osteomyelitis of left foot    Consults  Subjective:     Interval History: F/U ESRD. Dialyzed yesterday.    Review of patient's allergies indicates:  No Known Allergies  Current Facility-Administered Medications   Medication Frequency    0.9%  NaCl infusion PRN    acetaminophen tablet 650 mg Q4H PRN    ALPRAZolam tablet 0.5 mg TID PRN    amLODIPine tablet 10 mg Daily    ampicillin (OMNIPEN) 2 g in sodium chloride 0.9 % 100 mL IVPB (MB+) Q12H    buPROPion TBSR 12 hr tablet 150 mg BID    carvediloL tablet 25 mg BID WM    cloNIDine tablet 0.1 mg TID    dextrose 50% injection 12.5 g PRN    dextrose 50% injection 25 g PRN    docusate sodium capsule 100 mg BID    ferrous sulfate tablet 1 each Daily    glucagon (human recombinant) injection 1 mg PRN    glucose chewable tablet 16 g PRN    glucose chewable tablet 24 g PRN    heparin (porcine) injection 5,000 Units Q8H    hydrALAZINE tablet 25 mg Q8H PRN    insulin aspart U-100 injection 0-5 Units QID (AC + HS) PRN    insulin aspart U-100 injection 4 Units TIDWM    insulin detemir U-100 injection 12 Units QHS    isosorbide mononitrate 24 hr tablet 60 mg Daily    labetaloL injection 10 mg Q4H PRN    lactulose 20 gram/30 mL solution Soln 20 g TID    linaCLOtide capsule 145 mcg Daily PRN    losartan tablet 100 mg QHS    melatonin tablet 6 mg Nightly PRN    naloxone 0.4 mg/mL injection 0.02 mg PRN    ondansetron injection 4 mg Q8H PRN    pantoprazole EC tablet 40 mg Daily    sevelamer carbonate tablet 800 mg TIDWM    silver sulfADIAZINE 1% cream Daily PRN    sodium chloride 0.9% flush 10 mL Q12H PRN       Objective:     Vital Signs (Most Recent):  Temp: 98.3 °F (36.8 °C) (06/24/23 0914)  Pulse: 72  (06/24/23 0914)  Resp: 20 (06/24/23 0914)  BP: (!) 159/61 (06/24/23 0914)  SpO2: 100 % (06/24/23 0914) Vital Signs (24h Range):  Temp:  [97.9 °F (36.6 °C)-98.5 °F (36.9 °C)] 98.3 °F (36.8 °C)  Pulse:  [72-81] 72  Resp:  [16-20] 20  SpO2:  [95 %-100 %] 100 %  BP: (148-178)/(57-93) 159/61     Weight: 73.1 kg (161 lb 2.5 oz) (06/24/23 0500)  Body mass index is 28.55 kg/m².  Body surface area is 1.8 meters squared.    I/O last 3 completed shifts:  In: 580 [P.O.:480; IV Piggyback:100]  Out: 2502 [Other:2501; Stool:1]    Physical Exam Alert and in no distress. Chest is clear.     Significant Labs:sureBMP:   Recent Labs   Lab 06/19/23  0457   *      K 5.4*   CL 97*   CO2 26   *   CREATININE 7.40*   CALCIUM 8.6     CBC: No results for input(s): WBC, RBC, HGB, HCT, PLT, MCV, MCH, MCHC in the last 168 hours.  All labs within the past 24 hours have been reviewed.    Significant Imaging:  Labs: Reviewed    Assessment/Plan:     Active Diagnoses:    Diagnosis Date Noted POA    PRINCIPAL PROBLEM:  Osteomyelitis of left foot [M86.9] 05/25/2023 Yes    Paraplegia following spinal cord injury [G82.20]  Not Applicable     Chronic    Diabetic ulcer of toe of left foot associated with type 2 diabetes mellitus, with bone involvement without evidence of necrosis [E11.621, L97.526] 05/31/2023 Yes    Pressure injury of left heel, unstageable [L89.620] 05/31/2023 Yes    Pressure injury of right buttock, stage 2 [L89.312] 05/31/2023 Yes    HTN (hypertension) [I10] 05/25/2023 Yes     Chronic    Constipation [K59.00] 05/25/2023 Yes     Chronic    ESRD (end stage renal disease) [N18.6] 05/25/2023 Yes    Wound infection [T14.8XXA, L08.9] 05/25/2023 Yes    Major depression [F32.9] 05/25/2023 Yes      Problems Resolved During this Admission:    Diagnosis Date Noted Date Resolved POA    Right flank pain [R10.9] 06/16/2023 06/18/2023 Yes    Type 2 diabetes mellitus with kidney complication, with long-term current use of insulin  [E11.29, Z79.4] 05/25/2023 06/18/2023 Not Applicable     Chronic       Ongoing wound care. Dialysis MWF    Thank you for your consult. I will follow-up with patient. Please contact us if you have any additional questions.    Devan Aponte MD  Nephrology  [facility]

## 2023-06-24 NOTE — SUBJECTIVE & OBJECTIVE
Interval History: Feels well today. No complaints.     Review of Systems  Objective:     Vital Signs (Most Recent):  Temp: 98.3 °F (36.8 °C) (06/24/23 0914)  Pulse: 72 (06/24/23 0914)  Resp: 20 (06/24/23 0914)  BP: (!) 159/61 (06/24/23 0914)  SpO2: 100 % (06/24/23 0914) Vital Signs (24h Range):  Temp:  [97.9 °F (36.6 °C)-98.5 °F (36.9 °C)] 98.3 °F (36.8 °C)  Pulse:  [72-81] 72  Resp:  [16-20] 20  SpO2:  [95 %-100 %] 100 %  BP: (148-178)/(57-97) 159/61     Weight: 73.1 kg (161 lb 2.5 oz)  Body mass index is 28.55 kg/m².    Intake/Output Summary (Last 24 hours) at 6/24/2023 1120  Last data filed at 6/24/2023 0800  Gross per 24 hour   Intake 480 ml   Output 2502 ml   Net -2022 ml         Physical Exam  Vitals reviewed.   Constitutional:       General: He is awake. He is not in acute distress.     Appearance: He is well-developed. He is not toxic-appearing.   HENT:      Head: Normocephalic.      Nose: Nose normal.      Mouth/Throat:      Pharynx: Oropharynx is clear.   Eyes:      Extraocular Movements: Extraocular movements intact.      Pupils: Pupils are equal, round, and reactive to light.   Neck:      Thyroid: No thyroid mass.      Vascular: No carotid bruit.   Cardiovascular:      Rate and Rhythm: Normal rate and regular rhythm.      Pulses: Normal pulses.      Heart sounds: Normal heart sounds. No murmur heard.  Pulmonary:      Effort: Pulmonary effort is normal.      Breath sounds: Normal breath sounds and air entry. No wheezing.   Abdominal:      General: Bowel sounds are normal. There is no distension.      Palpations: Abdomen is soft.      Tenderness: There is no abdominal tenderness.   Musculoskeletal:         General: Signs of injury present. Normal range of motion.      Cervical back: Neck supple. No rigidity.      Comments: Wounds to 1st and 2nd digit left foot that are currently dressed   Skin:     General: Skin is warm.      Coloration: Skin is not jaundiced.      Findings: No lesion.   Neurological:       Mental Status: He is alert and oriented to person, place, and time.      Cranial Nerves: No cranial nerve deficit.      Motor: Weakness present.      Gait: Gait abnormal.      Comments:  1 over 5  weakness to bilateral lower extremities   Psychiatric:         Attention and Perception: Attention normal.         Mood and Affect: Mood normal.         Behavior: Behavior normal. Behavior is cooperative.         Thought Content: Thought content normal.         Cognition and Memory: Cognition normal.           Significant Labs: All pertinent labs within the past 24 hours have been reviewed.  BMP: No results for input(s): GLU, NA, K, CL, CO2, BUN, CREATININE, CALCIUM, MG in the last 48 hours.  CBC: No results for input(s): WBC, HGB, HCT, PLT in the last 48 hours.    Significant Imaging: I have reviewed all pertinent imaging results/findings within the past 24 hours.
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
## 2023-06-24 NOTE — SUBJECTIVE & OBJECTIVE
Interval History: Feeling well. Discussed the possible option of completing abx at NH. He is more comfortable staying here with our wound care.     Review of Systems  Objective:     Vital Signs (Most Recent):  Temp: 98.3 °F (36.8 °C) (06/24/23 0914)  Pulse: 72 (06/24/23 0914)  Resp: 20 (06/24/23 0914)  BP: (!) 159/61 (06/24/23 0914)  SpO2: 100 % (06/24/23 0914) Vital Signs (24h Range):  Temp:  [97.9 °F (36.6 °C)-98.5 °F (36.9 °C)] 98.3 °F (36.8 °C)  Pulse:  [72-81] 72  Resp:  [16-20] 20  SpO2:  [95 %-100 %] 100 %  BP: (148-178)/(57-97) 159/61     Weight: 73.1 kg (161 lb 2.5 oz)  Body mass index is 28.55 kg/m².    Intake/Output Summary (Last 24 hours) at 6/24/2023 1112  Last data filed at 6/24/2023 0800  Gross per 24 hour   Intake 480 ml   Output 2502 ml   Net -2022 ml         Physical Exam  Vitals reviewed.   Constitutional:       General: He is awake. He is not in acute distress.     Appearance: He is well-developed. He is not toxic-appearing.   HENT:      Head: Normocephalic.      Nose: Nose normal.      Mouth/Throat:      Pharynx: Oropharynx is clear.   Eyes:      Extraocular Movements: Extraocular movements intact.      Pupils: Pupils are equal, round, and reactive to light.   Neck:      Thyroid: No thyroid mass.      Vascular: No carotid bruit.   Cardiovascular:      Rate and Rhythm: Normal rate and regular rhythm.      Pulses: Normal pulses.      Heart sounds: Normal heart sounds. No murmur heard.  Pulmonary:      Effort: Pulmonary effort is normal.      Breath sounds: Normal breath sounds and air entry. No wheezing.   Abdominal:      General: Bowel sounds are normal. There is no distension.      Palpations: Abdomen is soft.      Tenderness: There is no abdominal tenderness.   Musculoskeletal:         General: Signs of injury present. Normal range of motion.      Cervical back: Neck supple. No rigidity.      Comments: Wounds to 1st and 2nd digit left foot that are currently dressed   Skin:     General: Skin is  warm.      Coloration: Skin is not jaundiced.      Findings: No lesion.   Neurological:      Mental Status: He is alert and oriented to person, place, and time.      Cranial Nerves: No cranial nerve deficit.      Motor: Weakness present.      Gait: Gait abnormal.      Comments:  1 over 5  weakness to bilateral lower extremities   Psychiatric:         Attention and Perception: Attention normal.         Mood and Affect: Mood normal.         Behavior: Behavior normal. Behavior is cooperative.         Thought Content: Thought content normal.         Cognition and Memory: Cognition normal.           Significant Labs: All pertinent labs within the past 24 hours have been reviewed.    Significant Imaging: I have reviewed all pertinent imaging results/findings within the past 24 hours.

## 2023-06-24 NOTE — ASSESSMENT & PLAN NOTE
1st and 2nd toe Left foot (acute) Osteomyelitis. Complicated by moderate vascular disease of Lower extremities.  CTA Bilateral lower extremities reveals moderate to severe calcified vascular disease affects the arteries of the bilateral lower extremities.    -medical management with Vancomycin and cefepime started on 5/25/23  -wound care consulted    5/29: may require biopsy and debridement by general surgery.  Per their last note, follow-up will be this week.      5/30: Wound culture with Proteus and Enterococcus both sensitive to ampicillin, with adjust therapy    Antibiotics (From admission, onward)    Start     Stop Route Frequency Ordered    06/10/23 1700  ampicillin (OMNIPEN) 2 g in sodium chloride 0.9 % 100 mL IVPB (MB+)         07/07 1559 IV Every 12 hours 06/10/23 1650    06/09/23 1526  silver sulfADIAZINE 1% cream         -- Top Daily PRN 06/09/23 1431    05/29/23 1130  mupirocin 2 % ointment         06/03 0859 Nasl 2 times daily 05/29/23 1028        6/19 - Course of abx continues.     6/20 - Wound care notes reviewed, continue abx and wound care.     6/24 - Tolerating abx without side effects. Completes 7/6/23

## 2023-06-25 LAB
GLUCOSE SERPL-MCNC: 111 MG/DL (ref 70–105)
GLUCOSE SERPL-MCNC: 141 MG/DL (ref 70–105)
GLUCOSE SERPL-MCNC: 191 MG/DL (ref 70–105)
GLUCOSE SERPL-MCNC: 68 MG/DL (ref 70–105)
GLUCOSE SERPL-MCNC: 88 MG/DL (ref 70–105)

## 2023-06-25 PROCEDURE — 11000001 HC ACUTE MED/SURG PRIVATE ROOM

## 2023-06-25 PROCEDURE — 25000003 PHARM REV CODE 250: Performed by: NURSE PRACTITIONER

## 2023-06-25 PROCEDURE — 63600175 PHARM REV CODE 636 W HCPCS: Performed by: HOSPITALIST

## 2023-06-25 PROCEDURE — 99232 PR SUBSEQUENT HOSPITAL CARE,LEVL II: ICD-10-PCS | Mod: ,,, | Performed by: FAMILY MEDICINE

## 2023-06-25 PROCEDURE — 25000003 PHARM REV CODE 250: Performed by: HOSPITALIST

## 2023-06-25 PROCEDURE — 82962 GLUCOSE BLOOD TEST: CPT

## 2023-06-25 PROCEDURE — 99232 SBSQ HOSP IP/OBS MODERATE 35: CPT | Mod: ,,, | Performed by: FAMILY MEDICINE

## 2023-06-25 PROCEDURE — 63600175 PHARM REV CODE 636 W HCPCS: Performed by: NURSE PRACTITIONER

## 2023-06-25 RX ADMIN — SEVELAMER CARBONATE 800 MG: 800 TABLET, FILM COATED ORAL at 08:06

## 2023-06-25 RX ADMIN — HEPARIN SODIUM 5000 UNITS: 5000 INJECTION INTRAVENOUS; SUBCUTANEOUS at 02:06

## 2023-06-25 RX ADMIN — LOSARTAN POTASSIUM 100 MG: 100 TABLET, FILM COATED ORAL at 09:06

## 2023-06-25 RX ADMIN — AMLODIPINE BESYLATE 10 MG: 10 TABLET ORAL at 08:06

## 2023-06-25 RX ADMIN — DOCUSATE SODIUM 100 MG: 100 CAPSULE, LIQUID FILLED ORAL at 09:06

## 2023-06-25 RX ADMIN — DOCUSATE SODIUM 100 MG: 100 CAPSULE, LIQUID FILLED ORAL at 08:06

## 2023-06-25 RX ADMIN — HEPARIN SODIUM 5000 UNITS: 5000 INJECTION INTRAVENOUS; SUBCUTANEOUS at 09:06

## 2023-06-25 RX ADMIN — CARVEDILOL 25 MG: 25 TABLET, FILM COATED ORAL at 04:06

## 2023-06-25 RX ADMIN — CLONIDINE HYDROCHLORIDE 0.1 MG: 0.1 TABLET ORAL at 08:06

## 2023-06-25 RX ADMIN — SEVELAMER CARBONATE 800 MG: 800 TABLET, FILM COATED ORAL at 04:06

## 2023-06-25 RX ADMIN — INSULIN DETEMIR 12 UNITS: 100 INJECTION, SOLUTION SUBCUTANEOUS at 09:06

## 2023-06-25 RX ADMIN — LACTULOSE 20 G: 20 SOLUTION ORAL at 02:06

## 2023-06-25 RX ADMIN — ISOSORBIDE MONONITRATE 60 MG: 30 TABLET, EXTENDED RELEASE ORAL at 08:06

## 2023-06-25 RX ADMIN — SEVELAMER CARBONATE 800 MG: 800 TABLET, FILM COATED ORAL at 11:06

## 2023-06-25 RX ADMIN — CLONIDINE HYDROCHLORIDE 0.1 MG: 0.1 TABLET ORAL at 09:06

## 2023-06-25 RX ADMIN — BUPROPION HYDROCHLORIDE 150 MG: 150 TABLET, FILM COATED, EXTENDED RELEASE ORAL at 08:06

## 2023-06-25 RX ADMIN — FERROUS SULFATE TAB 325 MG (65 MG ELEMENTAL FE) 1 EACH: 325 (65 FE) TAB at 08:06

## 2023-06-25 RX ADMIN — CLONIDINE HYDROCHLORIDE 0.1 MG: 0.1 TABLET ORAL at 02:06

## 2023-06-25 RX ADMIN — CARVEDILOL 25 MG: 25 TABLET, FILM COATED ORAL at 08:06

## 2023-06-25 RX ADMIN — LACTULOSE 20 G: 20 SOLUTION ORAL at 09:06

## 2023-06-25 RX ADMIN — HEPARIN SODIUM 5000 UNITS: 5000 INJECTION INTRAVENOUS; SUBCUTANEOUS at 06:06

## 2023-06-25 RX ADMIN — AMPICILLIN SODIUM 2 G: 2 INJECTION, POWDER, FOR SOLUTION INTRAMUSCULAR; INTRAVENOUS at 04:06

## 2023-06-25 RX ADMIN — PANTOPRAZOLE SODIUM 40 MG: 40 TABLET, DELAYED RELEASE ORAL at 08:06

## 2023-06-25 RX ADMIN — BUPROPION HYDROCHLORIDE 150 MG: 150 TABLET, FILM COATED, EXTENDED RELEASE ORAL at 09:06

## 2023-06-26 PROBLEM — K59.00 CONSTIPATION: Chronic | Status: RESOLVED | Noted: 2023-05-25 | Resolved: 2023-06-26

## 2023-06-26 LAB
GLUCOSE SERPL-MCNC: 127 MG/DL (ref 70–105)
GLUCOSE SERPL-MCNC: 140 MG/DL (ref 70–105)
GLUCOSE SERPL-MCNC: 161 MG/DL (ref 70–105)
HBV SURFACE AG SERPL QL IA: NORMAL

## 2023-06-26 PROCEDURE — 99232 PR SUBSEQUENT HOSPITAL CARE,LEVL II: ICD-10-PCS | Mod: ,,, | Performed by: FAMILY MEDICINE

## 2023-06-26 PROCEDURE — 25000003 PHARM REV CODE 250: Performed by: INTERNAL MEDICINE

## 2023-06-26 PROCEDURE — 25000003 PHARM REV CODE 250: Performed by: HOSPITALIST

## 2023-06-26 PROCEDURE — 63600175 PHARM REV CODE 636 W HCPCS: Performed by: NURSE PRACTITIONER

## 2023-06-26 PROCEDURE — 25000003 PHARM REV CODE 250: Performed by: NURSE PRACTITIONER

## 2023-06-26 PROCEDURE — 99232 SBSQ HOSP IP/OBS MODERATE 35: CPT | Mod: ,,, | Performed by: FAMILY MEDICINE

## 2023-06-26 PROCEDURE — 90935 HEMODIALYSIS ONE EVALUATION: CPT

## 2023-06-26 PROCEDURE — 82962 GLUCOSE BLOOD TEST: CPT

## 2023-06-26 PROCEDURE — 87340 HEPATITIS B SURFACE AG IA: CPT | Performed by: INTERNAL MEDICINE

## 2023-06-26 PROCEDURE — 63600175 PHARM REV CODE 636 W HCPCS: Performed by: HOSPITALIST

## 2023-06-26 PROCEDURE — 25000003 PHARM REV CODE 250: Performed by: FAMILY MEDICINE

## 2023-06-26 PROCEDURE — 11000001 HC ACUTE MED/SURG PRIVATE ROOM

## 2023-06-26 RX ORDER — CLONIDINE HYDROCHLORIDE 0.2 MG/1
0.2 TABLET ORAL 3 TIMES DAILY
Status: DISCONTINUED | OUTPATIENT
Start: 2023-06-26 | End: 2023-07-01

## 2023-06-26 RX ADMIN — CLONIDINE HYDROCHLORIDE 0.2 MG: 0.2 TABLET ORAL at 02:06

## 2023-06-26 RX ADMIN — CARVEDILOL 25 MG: 25 TABLET, FILM COATED ORAL at 05:06

## 2023-06-26 RX ADMIN — AMPICILLIN SODIUM 2 G: 2 INJECTION, POWDER, FOR SOLUTION INTRAMUSCULAR; INTRAVENOUS at 04:06

## 2023-06-26 RX ADMIN — INSULIN ASPART 4 UNITS: 100 INJECTION, SOLUTION INTRAVENOUS; SUBCUTANEOUS at 06:06

## 2023-06-26 RX ADMIN — CLONIDINE HYDROCHLORIDE 0.1 MG: 0.1 TABLET ORAL at 08:06

## 2023-06-26 RX ADMIN — SEVELAMER CARBONATE 800 MG: 800 TABLET, FILM COATED ORAL at 06:06

## 2023-06-26 RX ADMIN — DOCUSATE SODIUM 100 MG: 100 CAPSULE, LIQUID FILLED ORAL at 09:06

## 2023-06-26 RX ADMIN — AMLODIPINE BESYLATE 10 MG: 10 TABLET ORAL at 08:06

## 2023-06-26 RX ADMIN — BUPROPION HYDROCHLORIDE 150 MG: 150 TABLET, FILM COATED, EXTENDED RELEASE ORAL at 08:06

## 2023-06-26 RX ADMIN — CLONIDINE HYDROCHLORIDE 0.2 MG: 0.2 TABLET ORAL at 08:06

## 2023-06-26 RX ADMIN — HEPARIN SODIUM 5000 UNITS: 5000 INJECTION INTRAVENOUS; SUBCUTANEOUS at 06:06

## 2023-06-26 RX ADMIN — HEPARIN SODIUM 5000 UNITS: 5000 INJECTION INTRAVENOUS; SUBCUTANEOUS at 02:06

## 2023-06-26 RX ADMIN — SEVELAMER CARBONATE 800 MG: 800 TABLET, FILM COATED ORAL at 05:06

## 2023-06-26 RX ADMIN — SILVER SULFADIAZINE: 10 CREAM TOPICAL at 03:06

## 2023-06-26 RX ADMIN — ISOSORBIDE MONONITRATE 60 MG: 30 TABLET, EXTENDED RELEASE ORAL at 08:06

## 2023-06-26 RX ADMIN — PANTOPRAZOLE SODIUM 40 MG: 40 TABLET, DELAYED RELEASE ORAL at 08:06

## 2023-06-26 RX ADMIN — LACTULOSE 20 G: 20 SOLUTION ORAL at 02:06

## 2023-06-26 RX ADMIN — FERROUS SULFATE TAB 325 MG (65 MG ELEMENTAL FE) 1 EACH: 325 (65 FE) TAB at 08:06

## 2023-06-26 RX ADMIN — DOCUSATE SODIUM 100 MG: 100 CAPSULE, LIQUID FILLED ORAL at 08:06

## 2023-06-26 RX ADMIN — CARVEDILOL 25 MG: 25 TABLET, FILM COATED ORAL at 08:06

## 2023-06-26 RX ADMIN — LACTULOSE 20 G: 20 SOLUTION ORAL at 08:06

## 2023-06-26 RX ADMIN — INSULIN DETEMIR 12 UNITS: 100 INJECTION, SOLUTION SUBCUTANEOUS at 08:06

## 2023-06-26 RX ADMIN — SEVELAMER CARBONATE 800 MG: 800 TABLET, FILM COATED ORAL at 02:06

## 2023-06-26 RX ADMIN — AMPICILLIN SODIUM 2 G: 2 INJECTION, POWDER, FOR SOLUTION INTRAMUSCULAR; INTRAVENOUS at 05:06

## 2023-06-26 RX ADMIN — LOSARTAN POTASSIUM 100 MG: 100 TABLET, FILM COATED ORAL at 08:06

## 2023-06-26 RX ADMIN — SODIUM CHLORIDE 2000 ML: 9 INJECTION, SOLUTION INTRAVENOUS at 02:06

## 2023-06-26 RX ADMIN — HEPARIN SODIUM 5000 UNITS: 5000 INJECTION INTRAVENOUS; SUBCUTANEOUS at 10:06
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
## 2023-06-26 NOTE — PLAN OF CARE
Problem: Adult Inpatient Plan of Care  Goal: Plan of Care Review  6/26/2023 1819 by Daniela Starr RN  Outcome: Ongoing, Progressing  6/26/2023 1530 by Daniela Starr RN  Outcome: Ongoing, Progressing  Goal: Patient-Specific Goal (Individualized)  6/26/2023 1819 by Daniela Starr RN  Outcome: Ongoing, Progressing  6/26/2023 1530 by Daniela Starr RN  Outcome: Ongoing, Progressing  Goal: Absence of Hospital-Acquired Illness or Injury  6/26/2023 1819 by Daniela Starr RN  Outcome: Ongoing, Progressing  6/26/2023 1530 by Daniela Starr RN  Outcome: Ongoing, Progressing  Goal: Optimal Comfort and Wellbeing  6/26/2023 1819 by Daniela Starr RN  Outcome: Ongoing, Progressing  6/26/2023 1530 by Daniela Starr RN  Outcome: Ongoing, Progressing  Goal: Readiness for Transition of Care  6/26/2023 1819 by Daniela Starr RN  Outcome: Ongoing, Progressing  6/26/2023 1530 by Daniela Starr RN  Outcome: Ongoing, Progressing     Problem: Diabetes Comorbidity  Goal: Blood Glucose Level Within Targeted Range  6/26/2023 1819 by Daniela Starr RN  Outcome: Ongoing, Progressing  6/26/2023 1530 by Daniela Starr RN  Outcome: Ongoing, Progressing     Problem: Impaired Wound Healing  Goal: Optimal Wound Healing  6/26/2023 1819 by Daniela Starr RN  Outcome: Ongoing, Progressing  6/26/2023 1530 by Daniela Starr RN  Outcome: Ongoing, Progressing     Problem: Fall Injury Risk  Goal: Absence of Fall and Fall-Related Injury  6/26/2023 1819 by Daniela Starr RN  Outcome: Ongoing, Progressing  6/26/2023 1530 by Daniela Starr RN  Outcome: Ongoing, Progressing     Problem: Infection  Goal: Absence of Infection Signs and Symptoms  6/26/2023 1819 by Daniela Starr RN  Outcome: Ongoing, Progressing  6/26/2023 1530 by Daniela Starr RN  Outcome: Ongoing, Progressing     Problem: Skin Injury Risk Increased  Goal: Skin Health and Integrity  6/26/2023 1819 by Daniela Starr RN  Outcome: Ongoing, Progressing  6/26/2023 1530 by Daniela Starr  RN  Outcome: Ongoing, Progressing     Problem: Device-Related Complication Risk (Hemodialysis)  Goal: Safe, Effective Therapy Delivery  6/26/2023 1819 by Daniela Starr RN  Outcome: Ongoing, Progressing  6/26/2023 1530 by Daniela Starr RN  Outcome: Ongoing, Progressing     Problem: Hemodynamic Instability (Hemodialysis)  Goal: Effective Tissue Perfusion  6/26/2023 1819 by Daniela Starr RN  Outcome: Ongoing, Progressing  6/26/2023 1530 by Daniela Starr RN  Outcome: Ongoing, Progressing     Problem: Infection (Hemodialysis)  Goal: Absence of Infection Signs and Symptoms  6/26/2023 1819 by Daniela Starr RN  Outcome: Ongoing, Progressing  6/26/2023 1530 by Daniela Starr RN  Outcome: Ongoing, Progressing
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
## 2023-06-26 NOTE — ASSESSMENT & PLAN NOTE
Patient has persistent depression which is moderate and is currently controlled. Will Continue anti-depressant medications. We will not consult psychiatry at this time. Patient does not display psychosis at this time. Continue to monitor closely and adjust plan of care as needed.  Continue home antidepressant.    6/19 - Euthymic       No

## 2023-06-26 NOTE — ASSESSMENT & PLAN NOTE
[id number]

## 2023-06-26 NOTE — DIALYSIS ROUNDING
The patient was dialyzed today without complication  PE  Lungs-  Cor-2/6 systolic murmur  Abd-soft    Plan:  Continue the present care

## 2023-06-26 NOTE — SUBJECTIVE & OBJECTIVE
Interval History: Pt examined at bedside this am. He is resting comfortably in bed, in no acute distress. Pt has no acute complaints.     Abx to be completed on 7/6.   Review of Systems  Objective:     Vital Signs (Most Recent):  Temp: 97.5 °F (36.4 °C) (06/26/23 0914)  Pulse: 70 (06/26/23 1130)  Resp: 19 (06/26/23 0914)  BP: (!) 158/83 (06/26/23 1130)  SpO2: 99 % (06/26/23 0800) Vital Signs (24h Range):  Temp:  [97.5 °F (36.4 °C)-98.4 °F (36.9 °C)] 97.5 °F (36.4 °C)  Pulse:  [67-75] 70  Resp:  [16-20] 19  SpO2:  [96 %-99 %] 99 %  BP: (149-170)/(54-87) 158/83     Weight: 74.9 kg (165 lb 2 oz)  Body mass index is 29.25 kg/m².    Intake/Output Summary (Last 24 hours) at 6/26/2023 1230  Last data filed at 6/26/2023 0800  Gross per 24 hour   Intake 1440 ml   Output --   Net 1440 ml         Physical Exam  Vitals reviewed.   Constitutional:       General: He is awake. He is not in acute distress.     Appearance: He is well-developed. He is not toxic-appearing.   HENT:      Head: Normocephalic.      Nose: Nose normal.      Mouth/Throat:      Pharynx: Oropharynx is clear.   Eyes:      Extraocular Movements: Extraocular movements intact.      Pupils: Pupils are equal, round, and reactive to light.   Neck:      Thyroid: No thyroid mass.      Vascular: No carotid bruit.   Cardiovascular:      Rate and Rhythm: Normal rate and regular rhythm.      Pulses: Normal pulses.      Heart sounds: Normal heart sounds. No murmur heard.  Pulmonary:      Effort: Pulmonary effort is normal.      Breath sounds: Normal breath sounds and air entry. No wheezing.   Abdominal:      General: Bowel sounds are normal. There is no distension.      Palpations: Abdomen is soft.      Tenderness: There is no abdominal tenderness.   Musculoskeletal:         General: Signs of injury present. Normal range of motion.      Cervical back: Neck supple. No rigidity.      Comments: Wounds to 1st and 2nd digit left foot that are currently dressed   Skin:      General: Skin is warm.      Coloration: Skin is not jaundiced.      Findings: No lesion.   Neurological:      Mental Status: He is alert and oriented to person, place, and time.      Cranial Nerves: No cranial nerve deficit.      Motor: Weakness present.      Gait: Gait abnormal.      Comments:  1 over 5  weakness to bilateral lower extremities   Psychiatric:         Attention and Perception: Attention normal.         Mood and Affect: Mood normal.         Behavior: Behavior normal. Behavior is cooperative.         Thought Content: Thought content normal.         Cognition and Memory: Cognition normal.           Significant Labs: All pertinent labs within the past 24 hours have been reviewed.    Significant Imaging: I have reviewed all pertinent imaging results/findings within the past 24 hours.

## 2023-06-26 NOTE — SUBJECTIVE & OBJECTIVE
Interval History: No complaints today.  Completes antibiotics. 7/6. Would like to complete here.    Review of Systems  Objective:     Vital Signs (Most Recent):  Temp: 98.2 °F (36.8 °C) (06/25/23 2000)  Pulse: 71 (06/25/23 2000)  Resp: 18 (06/25/23 2000)  BP: (!) 164/58 (06/25/23 2000)  SpO2: 97 % (06/25/23 2000) Vital Signs (24h Range):  Temp:  [97.5 °F (36.4 °C)-98.5 °F (36.9 °C)] 98.2 °F (36.8 °C)  Pulse:  [66-74] 71  Resp:  [16-20] 18  SpO2:  [96 %-99 %] 97 %  BP: (146-175)/(42-61) 164/58     Weight: 73.1 kg (161 lb 2.5 oz)  Body mass index is 28.55 kg/m².    Intake/Output Summary (Last 24 hours) at 6/25/2023 2016  Last data filed at 6/25/2023 1800  Gross per 24 hour   Intake 1080 ml   Output --   Net 1080 ml         Physical Exam  Vitals reviewed.   Constitutional:       General: He is awake. He is not in acute distress.     Appearance: He is well-developed. He is not toxic-appearing.   HENT:      Head: Normocephalic.      Nose: Nose normal.      Mouth/Throat:      Pharynx: Oropharynx is clear.   Eyes:      Extraocular Movements: Extraocular movements intact.      Pupils: Pupils are equal, round, and reactive to light.   Neck:      Thyroid: No thyroid mass.      Vascular: No carotid bruit.   Cardiovascular:      Rate and Rhythm: Normal rate and regular rhythm.      Pulses: Normal pulses.      Heart sounds: Normal heart sounds. No murmur heard.  Pulmonary:      Effort: Pulmonary effort is normal.      Breath sounds: Normal breath sounds and air entry. No wheezing.   Abdominal:      General: Bowel sounds are normal. There is no distension.      Palpations: Abdomen is soft.      Tenderness: There is no abdominal tenderness.   Musculoskeletal:         General: Signs of injury present. Normal range of motion.      Cervical back: Neck supple. No rigidity.      Comments: Wounds to 1st and 2nd digit left foot that are currently dressed   Skin:     General: Skin is warm.      Coloration: Skin is not jaundiced.       Findings: No lesion.   Neurological:      Mental Status: He is alert and oriented to person, place, and time.      Cranial Nerves: No cranial nerve deficit.      Motor: Weakness present.      Gait: Gait abnormal.      Comments:  1 over 5  weakness to bilateral lower extremities   Psychiatric:         Attention and Perception: Attention normal.         Mood and Affect: Mood normal.         Behavior: Behavior normal. Behavior is cooperative.         Thought Content: Thought content normal.         Cognition and Memory: Cognition normal.           Significant Labs: All pertinent labs within the past 24 hours have been reviewed.  BMP: No results for input(s): GLU, NA, K, CL, CO2, BUN, CREATININE, CALCIUM, MG in the last 48 hours.  CBC: No results for input(s): WBC, HGB, HCT, PLT in the last 48 hours.    Significant Imaging: I have reviewed all pertinent imaging results/findings within the past 24 hours.

## 2023-06-27 PROBLEM — G82.21 PARAPLEGIA, COMPLETE: Status: ACTIVE | Noted: 2019-12-23

## 2023-06-27 PROBLEM — N18.6 TYPE 2 DIABETES MELLITUS WITH ESRD (END-STAGE RENAL DISEASE): Status: ACTIVE | Noted: 2019-09-17

## 2023-06-27 PROBLEM — R53.1 WEAKNESS: Status: ACTIVE | Noted: 2019-12-23

## 2023-06-27 PROBLEM — Z99.2 ESRD ON DIALYSIS: Status: ACTIVE | Noted: 2018-08-21

## 2023-06-27 PROBLEM — Z86.16 PERSONAL HISTORY OF COVID-19: Status: ACTIVE | Noted: 2022-06-01

## 2023-06-27 PROBLEM — R26.89 OTHER ABNORMALITIES OF GAIT AND MOBILITY: Status: ACTIVE | Noted: 2020-11-03

## 2023-06-27 PROBLEM — J90 PLEURAL EFFUSION, NOT ELSEWHERE CLASSIFIED: Status: ACTIVE | Noted: 2022-01-11

## 2023-06-27 PROBLEM — Z99.2 HEMODIALYSIS ACCESS, AV GRAFT: Status: ACTIVE | Noted: 2022-05-13

## 2023-06-27 PROBLEM — E88.09 HYPOALBUMINEMIA: Status: ACTIVE | Noted: 2019-12-30

## 2023-06-27 PROBLEM — D64.9 ANEMIA: Status: ACTIVE | Noted: 2019-05-26

## 2023-06-27 PROBLEM — D63.1 ANEMIA IN ESRD (END-STAGE RENAL DISEASE): Status: ACTIVE | Noted: 2019-09-17

## 2023-06-27 PROBLEM — E46 UNSPECIFIED PROTEIN-CALORIE MALNUTRITION: Status: ACTIVE | Noted: 2019-12-27

## 2023-06-27 PROBLEM — E11.22 TYPE 2 DIABETES MELLITUS WITH ESRD (END-STAGE RENAL DISEASE): Status: ACTIVE | Noted: 2019-09-17

## 2023-06-27 PROBLEM — E78.5 HYPERLIPIDEMIA, UNSPECIFIED: Status: ACTIVE | Noted: 2019-12-23

## 2023-06-27 PROBLEM — G83.9 PARALYTIC SYNDROME, UNSPECIFIED: Status: ACTIVE | Noted: 2019-12-23

## 2023-06-27 PROBLEM — R13.12 DYSPHAGIA, OROPHARYNGEAL PHASE: Status: ACTIVE | Noted: 2019-12-23

## 2023-06-27 PROBLEM — S32.028D: Status: ACTIVE | Noted: 2019-12-23

## 2023-06-27 PROBLEM — M62.81 MUSCLE WEAKNESS (GENERALIZED): Status: ACTIVE | Noted: 2019-12-23

## 2023-06-27 PROBLEM — Z99.2 DEPENDENCE ON RENAL DIALYSIS: Status: ACTIVE | Noted: 2019-12-23

## 2023-06-27 PROBLEM — E11.21 DIABETIC NEPHROPATHY: Status: ACTIVE | Noted: 2018-08-21

## 2023-06-27 PROBLEM — A41.9 SEPSIS: Status: ACTIVE | Noted: 2019-05-25

## 2023-06-27 PROBLEM — E11.00 HYPEROSMOLAR NON-KETOTIC STATE IN PATIENT WITH TYPE 2 DIABETES MELLITUS: Status: ACTIVE | Noted: 2019-05-25

## 2023-06-27 PROBLEM — R48.8 OTHER SYMBOLIC DYSFUNCTIONS: Status: ACTIVE | Noted: 2019-12-23

## 2023-06-27 PROBLEM — F33.9 MAJOR DEPRESSIVE DISORDER, RECURRENT, UNSPECIFIED: Status: ACTIVE | Noted: 2019-12-23

## 2023-06-27 PROBLEM — S24.109D: Status: ACTIVE | Noted: 2019-12-23

## 2023-06-27 PROBLEM — R33.9 URINARY RETENTION: Status: ACTIVE | Noted: 2020-01-13

## 2023-06-27 PROBLEM — N28.9 DISORDER OF KIDNEY AND URETER, UNSPECIFIED: Status: ACTIVE | Noted: 2020-10-14

## 2023-06-27 PROBLEM — E87.5 HYPERKALEMIA: Status: ACTIVE | Noted: 2020-07-06

## 2023-06-27 PROBLEM — I10 ESSENTIAL (PRIMARY) HYPERTENSION: Status: ACTIVE | Noted: 2019-12-23

## 2023-06-27 PROBLEM — S32.009A LUMBAR TRANSVERSE PROCESS FRACTURE, CLOSED, INITIAL ENCOUNTER: Status: ACTIVE | Noted: 2019-10-21

## 2023-06-27 PROBLEM — G82.20 PARAPLEGIA, UNSPECIFIED: Status: ACTIVE | Noted: 2020-01-22

## 2023-06-27 PROBLEM — N18.9 CHRONIC KIDNEY DISEASE, UNSPECIFIED: Status: ACTIVE | Noted: 2020-10-14

## 2023-06-27 PROBLEM — D68.59 HYPERCOAGULABLE STATE: Status: ACTIVE | Noted: 2021-03-09

## 2023-06-27 PROBLEM — S23.3XXA SPRAIN OF LIGAMENTS OF THORACIC SPINE: Status: ACTIVE | Noted: 2019-12-23

## 2023-06-27 PROBLEM — E11.65 TYPE 2 DIABETES MELLITUS WITH HYPERGLYCEMIA: Status: ACTIVE | Noted: 2021-04-25

## 2023-06-27 PROBLEM — G83.9 PARALYSIS: Status: ACTIVE | Noted: 2019-10-25

## 2023-06-27 PROBLEM — I50.30 (HFPEF) HEART FAILURE WITH PRESERVED EJECTION FRACTION: Status: ACTIVE | Noted: 2019-05-26

## 2023-06-27 PROBLEM — N18.6 ANEMIA IN ESRD (END-STAGE RENAL DISEASE): Status: ACTIVE | Noted: 2019-09-17

## 2023-06-27 PROBLEM — R27.8 OTHER LACK OF COORDINATION: Status: ACTIVE | Noted: 2020-10-14

## 2023-06-27 PROBLEM — H40.9 UNSPECIFIED GLAUCOMA: Status: ACTIVE | Noted: 2019-12-23

## 2023-06-27 PROBLEM — E56.9 VITAMIN DEFICIENCY, UNSPECIFIED: Status: ACTIVE | Noted: 2019-12-23

## 2023-06-27 PROBLEM — G47.30 SLEEP APNEA, UNSPECIFIED: Status: ACTIVE | Noted: 2019-12-23

## 2023-06-27 PROBLEM — R26.2 DIFFICULTY IN WALKING, NOT ELSEWHERE CLASSIFIED: Status: ACTIVE | Noted: 2019-12-23

## 2023-06-27 PROBLEM — R11.2 NON-INTRACTABLE VOMITING WITH NAUSEA: Status: ACTIVE | Noted: 2019-10-21

## 2023-06-27 PROBLEM — K21.9 GASTRO-ESOPHAGEAL REFLUX DISEASE WITHOUT ESOPHAGITIS: Status: ACTIVE | Noted: 2020-05-22

## 2023-06-27 PROBLEM — R53.81 PHYSICAL DECONDITIONING: Status: ACTIVE | Noted: 2019-05-26

## 2023-06-27 PROBLEM — N18.6 ESRD ON DIALYSIS: Status: ACTIVE | Noted: 2018-08-21

## 2023-06-27 PROBLEM — I10 BENIGN ESSENTIAL HTN: Status: ACTIVE | Noted: 2018-08-21

## 2023-06-27 LAB
CREAT SERPL-MCNC: 5 MG/DL (ref 0.7–1.3)
EGFR (NO RACE VARIABLE) (RUSH/TITUS): 15 ML/MIN/1.73M2
GLUCOSE SERPL-MCNC: 138 MG/DL (ref 70–105)
GLUCOSE SERPL-MCNC: 145 MG/DL (ref 70–105)
GLUCOSE SERPL-MCNC: 147 MG/DL (ref 70–105)
GLUCOSE SERPL-MCNC: 251 MG/DL (ref 70–105)
GLUCOSE SERPL-MCNC: 87 MG/DL (ref 70–105)

## 2023-06-27 PROCEDURE — 25000003 PHARM REV CODE 250: Performed by: HOSPITALIST

## 2023-06-27 PROCEDURE — 25000003 PHARM REV CODE 250: Performed by: FAMILY MEDICINE

## 2023-06-27 PROCEDURE — 99232 PR SUBSEQUENT HOSPITAL CARE,LEVL II: ICD-10-PCS | Mod: ,,, | Performed by: NURSE PRACTITIONER

## 2023-06-27 PROCEDURE — 99232 SBSQ HOSP IP/OBS MODERATE 35: CPT | Mod: ,,, | Performed by: NURSE PRACTITIONER

## 2023-06-27 PROCEDURE — 63600175 PHARM REV CODE 636 W HCPCS: Performed by: HOSPITALIST

## 2023-06-27 PROCEDURE — 99232 SBSQ HOSP IP/OBS MODERATE 35: CPT | Mod: ,,, | Performed by: FAMILY MEDICINE

## 2023-06-27 PROCEDURE — 82565 ASSAY OF CREATININE: CPT | Performed by: FAMILY MEDICINE

## 2023-06-27 PROCEDURE — 82962 GLUCOSE BLOOD TEST: CPT

## 2023-06-27 PROCEDURE — 63600175 PHARM REV CODE 636 W HCPCS: Performed by: NURSE PRACTITIONER

## 2023-06-27 PROCEDURE — A6212 FOAM DRG <=16 SQ IN W/BORDER: HCPCS

## 2023-06-27 PROCEDURE — 25000003 PHARM REV CODE 250: Performed by: NURSE PRACTITIONER

## 2023-06-27 PROCEDURE — 99232 PR SUBSEQUENT HOSPITAL CARE,LEVL II: ICD-10-PCS | Mod: ,,, | Performed by: FAMILY MEDICINE

## 2023-06-27 PROCEDURE — 63600175 PHARM REV CODE 636 W HCPCS: Performed by: FAMILY MEDICINE

## 2023-06-27 PROCEDURE — 25000003 PHARM REV CODE 250: Performed by: INTERNAL MEDICINE

## 2023-06-27 PROCEDURE — 11000001 HC ACUTE MED/SURG PRIVATE ROOM

## 2023-06-27 RX ADMIN — AMPICILLIN SODIUM 2 G: 2 INJECTION, POWDER, FOR SOLUTION INTRAMUSCULAR; INTRAVENOUS at 03:06

## 2023-06-27 RX ADMIN — PANTOPRAZOLE SODIUM 40 MG: 40 TABLET, DELAYED RELEASE ORAL at 08:06

## 2023-06-27 RX ADMIN — DOCUSATE SODIUM 100 MG: 100 CAPSULE, LIQUID FILLED ORAL at 08:06

## 2023-06-27 RX ADMIN — INSULIN ASPART 4 UNITS: 100 INJECTION, SOLUTION INTRAVENOUS; SUBCUTANEOUS at 12:06

## 2023-06-27 RX ADMIN — LACTULOSE 20 G: 20 SOLUTION ORAL at 08:06

## 2023-06-27 RX ADMIN — LACTULOSE 20 G: 20 SOLUTION ORAL at 03:06

## 2023-06-27 RX ADMIN — CARVEDILOL 25 MG: 25 TABLET, FILM COATED ORAL at 08:06

## 2023-06-27 RX ADMIN — LOSARTAN POTASSIUM 100 MG: 100 TABLET, FILM COATED ORAL at 08:06

## 2023-06-27 RX ADMIN — AMLODIPINE BESYLATE 10 MG: 10 TABLET ORAL at 08:06

## 2023-06-27 RX ADMIN — ALPRAZOLAM 0.5 MG: 0.25 TABLET ORAL at 08:06

## 2023-06-27 RX ADMIN — CLONIDINE HYDROCHLORIDE 0.2 MG: 0.2 TABLET ORAL at 08:06

## 2023-06-27 RX ADMIN — FERROUS SULFATE TAB 325 MG (65 MG ELEMENTAL FE) 1 EACH: 325 (65 FE) TAB at 08:06

## 2023-06-27 RX ADMIN — SEVELAMER CARBONATE 800 MG: 800 TABLET, FILM COATED ORAL at 06:06

## 2023-06-27 RX ADMIN — HYDRALAZINE HYDROCHLORIDE 25 MG: 25 TABLET ORAL at 06:06

## 2023-06-27 RX ADMIN — SEVELAMER CARBONATE 800 MG: 800 TABLET, FILM COATED ORAL at 12:06

## 2023-06-27 RX ADMIN — BUPROPION HYDROCHLORIDE 150 MG: 150 TABLET, FILM COATED, EXTENDED RELEASE ORAL at 09:06

## 2023-06-27 RX ADMIN — ISOSORBIDE MONONITRATE 60 MG: 30 TABLET, EXTENDED RELEASE ORAL at 09:06

## 2023-06-27 RX ADMIN — CLONIDINE HYDROCHLORIDE 0.2 MG: 0.2 TABLET ORAL at 03:06

## 2023-06-27 RX ADMIN — CARVEDILOL 25 MG: 25 TABLET, FILM COATED ORAL at 04:06

## 2023-06-27 RX ADMIN — HEPARIN SODIUM 5000 UNITS: 5000 INJECTION INTRAVENOUS; SUBCUTANEOUS at 09:06

## 2023-06-27 RX ADMIN — SEVELAMER CARBONATE 800 MG: 800 TABLET, FILM COATED ORAL at 04:06

## 2023-06-27 RX ADMIN — BUPROPION HYDROCHLORIDE 150 MG: 150 TABLET, FILM COATED, EXTENDED RELEASE ORAL at 08:06

## 2023-06-27 RX ADMIN — INSULIN DETEMIR 12 UNITS: 100 INJECTION, SOLUTION SUBCUTANEOUS at 08:06

## 2023-06-27 RX ADMIN — INSULIN ASPART 4 UNITS: 100 INJECTION, SOLUTION INTRAVENOUS; SUBCUTANEOUS at 04:06

## 2023-06-27 RX ADMIN — HEPARIN SODIUM 5000 UNITS: 5000 INJECTION INTRAVENOUS; SUBCUTANEOUS at 06:06

## 2023-06-27 RX ADMIN — INSULIN ASPART 3 UNITS: 100 INJECTION, SOLUTION INTRAVENOUS; SUBCUTANEOUS at 12:06

## 2023-06-27 RX ADMIN — HEPARIN SODIUM 5000 UNITS: 5000 INJECTION INTRAVENOUS; SUBCUTANEOUS at 03:06

## 2023-06-27 NOTE — SUBJECTIVE & OBJECTIVE
Interval History: Pt examined at bedside this am. He is resting comfortably in bed, in no acute distress, eating his breakfast. Pt has no acute complaints. Had a bout of hypoglycemia this morning resolved after peanut butter lópez crackers and cranberry juice.    Review of Systems  Objective:     Vital Signs (Most Recent):  Temp: 98.3 °F (36.8 °C) (06/27/23 0746)  Pulse: 65 (06/27/23 0746)  Resp: 20 (06/27/23 0746)  BP: (!) 158/57 (06/27/23 0746)  SpO2: 100 % (06/27/23 0746) Vital Signs (24h Range):  Temp:  [97.5 °F (36.4 °C)-98.9 °F (37.2 °C)] 98.3 °F (36.8 °C)  Pulse:  [] 65  Resp:  [16-20] 20  SpO2:  [97 %-100 %] 100 %  BP: (147-175)/(57-87) 158/57     Weight: 73.3 kg (161 lb 9.6 oz)  Body mass index is 28.63 kg/m².    Intake/Output Summary (Last 24 hours) at 6/27/2023 0842  Last data filed at 6/26/2023 1800  Gross per 24 hour   Intake 740 ml   Output 3500 ml   Net -2760 ml         Physical Exam  Constitutional:       Appearance: Normal appearance. He is normal weight. He is diaphoretic.   HENT:      Head: Normocephalic and atraumatic.   Eyes:      Extraocular Movements: Extraocular movements intact.      Conjunctiva/sclera: Conjunctivae normal.      Pupils: Pupils are equal, round, and reactive to light.   Cardiovascular:      Rate and Rhythm: Normal rate.   Pulmonary:      Effort: No respiratory distress.      Breath sounds: No stridor. No wheezing or rhonchi.   Chest:      Chest wall: No tenderness.   Abdominal:      General: Abdomen is flat.   Musculoskeletal:      Cervical back: Normal range of motion and neck supple.   Neurological:      Mental Status: He is alert and oriented to person, place, and time.   Psychiatric:         Mood and Affect: Mood normal.         Behavior: Behavior normal.         Thought Content: Thought content normal.         Judgment: Judgment normal.           Significant Labs: All pertinent labs within the past 24 hours have been reviewed.    Significant Imaging: I have  reviewed all pertinent imaging results/findings within the past 24 hours.
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
## 2023-06-27 NOTE — ASSESSMENT & PLAN NOTE
06/05 - Surgery to see patient about foot  Has peripheral arterial disease  Arteriogram done by vascular surgery   PICC line due to poor iv access.

## 2023-06-27 NOTE — ASSESSMENT & PLAN NOTE
Improved with Imdur, still some hypertension. Will adjust dose clonidine  and monitor.not at goal this morning second dose clonidine planned shortly.
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
## 2023-06-28 LAB
ANION GAP SERPL CALCULATED.3IONS-SCNC: 17 MMOL/L (ref 7–16)
BASOPHILS # BLD AUTO: 0.05 K/UL (ref 0–0.2)
BASOPHILS NFR BLD AUTO: 0.7 % (ref 0–1)
BUN SERPL-MCNC: 67 MG/DL (ref 7–18)
BUN/CREAT SERPL: 14 (ref 6–20)
CALCIUM SERPL-MCNC: 8.5 MG/DL (ref 8.5–10.1)
CHLORIDE SERPL-SCNC: 101 MMOL/L (ref 98–107)
CHOLEST SERPL-MCNC: 149 MG/DL (ref 0–200)
CHOLEST/HDLC SERPL: 2.3 {RATIO}
CO2 SERPL-SCNC: 28 MMOL/L (ref 21–32)
CREAT SERPL-MCNC: 4.9 MG/DL (ref 0.7–1.3)
DIFFERENTIAL METHOD BLD: ABNORMAL
EGFR (NO RACE VARIABLE) (RUSH/TITUS): 15 ML/MIN/1.73M2
EOSINOPHIL # BLD AUTO: 0.88 K/UL (ref 0–0.5)
EOSINOPHIL NFR BLD AUTO: 12 % (ref 1–4)
ERYTHROCYTE [DISTWIDTH] IN BLOOD BY AUTOMATED COUNT: 14.3 % (ref 11.5–14.5)
GLUCOSE SERPL-MCNC: 105 MG/DL (ref 70–105)
GLUCOSE SERPL-MCNC: 167 MG/DL (ref 70–105)
GLUCOSE SERPL-MCNC: 196 MG/DL (ref 70–105)
GLUCOSE SERPL-MCNC: 42 MG/DL (ref 74–106)
GLUCOSE SERPL-MCNC: 50 MG/DL (ref 70–105)
GLUCOSE SERPL-MCNC: 80 MG/DL (ref 70–105)
HCT VFR BLD AUTO: 24 % (ref 40–54)
HDLC SERPL-MCNC: 66 MG/DL (ref 40–60)
HGB BLD-MCNC: 7.6 G/DL (ref 13.5–18)
IMM GRANULOCYTES # BLD AUTO: 0.02 K/UL (ref 0–0.04)
IMM GRANULOCYTES NFR BLD: 0.3 % (ref 0–0.4)
LDLC SERPL CALC-MCNC: 75 MG/DL
LDLC/HDLC SERPL: 1.1 {RATIO}
LYMPHOCYTES # BLD AUTO: 0.75 K/UL (ref 1–4.8)
LYMPHOCYTES NFR BLD AUTO: 10.2 % (ref 27–41)
MCH RBC QN AUTO: 27.5 PG (ref 27–31)
MCHC RBC AUTO-ENTMCNC: 31.7 G/DL (ref 32–36)
MCV RBC AUTO: 87 FL (ref 80–96)
MONOCYTES # BLD AUTO: 0.73 K/UL (ref 0–0.8)
MONOCYTES NFR BLD AUTO: 10 % (ref 2–6)
MPC BLD CALC-MCNC: 9.8 FL (ref 9.4–12.4)
NEUTROPHILS # BLD AUTO: 4.9 K/UL (ref 1.8–7.7)
NEUTROPHILS NFR BLD AUTO: 66.8 % (ref 53–65)
NONHDLC SERPL-MCNC: 83 MG/DL
NRBC # BLD AUTO: 0 X10E3/UL
NRBC, AUTO (.00): 0 %
PLATELET # BLD AUTO: 209 K/UL (ref 150–400)
POTASSIUM SERPL-SCNC: 4 MMOL/L (ref 3.5–5.1)
RBC # BLD AUTO: 2.76 M/UL (ref 4.6–6.2)
SODIUM SERPL-SCNC: 142 MMOL/L (ref 136–145)
TRIGL SERPL-MCNC: 40 MG/DL (ref 35–150)
VLDLC SERPL-MCNC: 8 MG/DL
WBC # BLD AUTO: 7.33 K/UL (ref 4.5–11)

## 2023-06-28 PROCEDURE — 90935 HEMODIALYSIS ONE EVALUATION: CPT

## 2023-06-28 PROCEDURE — 25000003 PHARM REV CODE 250: Performed by: FAMILY MEDICINE

## 2023-06-28 PROCEDURE — 63600175 PHARM REV CODE 636 W HCPCS

## 2023-06-28 PROCEDURE — 99232 SBSQ HOSP IP/OBS MODERATE 35: CPT | Mod: ,,, | Performed by: FAMILY MEDICINE

## 2023-06-28 PROCEDURE — 25000003 PHARM REV CODE 250: Performed by: NURSE PRACTITIONER

## 2023-06-28 PROCEDURE — 63600175 PHARM REV CODE 636 W HCPCS: Performed by: NURSE PRACTITIONER

## 2023-06-28 PROCEDURE — 82962 GLUCOSE BLOOD TEST: CPT

## 2023-06-28 PROCEDURE — 63600175 PHARM REV CODE 636 W HCPCS: Performed by: HOSPITALIST

## 2023-06-28 PROCEDURE — 96372 THER/PROPH/DIAG INJ SC/IM: CPT

## 2023-06-28 PROCEDURE — 11000001 HC ACUTE MED/SURG PRIVATE ROOM

## 2023-06-28 PROCEDURE — 25000003 PHARM REV CODE 250: Performed by: INTERNAL MEDICINE

## 2023-06-28 PROCEDURE — 80048 BASIC METABOLIC PNL TOTAL CA: CPT | Performed by: FAMILY MEDICINE

## 2023-06-28 PROCEDURE — 80061 LIPID PANEL: CPT | Performed by: FAMILY MEDICINE

## 2023-06-28 PROCEDURE — 85025 COMPLETE CBC W/AUTO DIFF WBC: CPT | Performed by: FAMILY MEDICINE

## 2023-06-28 PROCEDURE — 63600175 PHARM REV CODE 636 W HCPCS: Performed by: FAMILY MEDICINE

## 2023-06-28 PROCEDURE — 25000003 PHARM REV CODE 250: Performed by: HOSPITALIST

## 2023-06-28 PROCEDURE — 99232 PR SUBSEQUENT HOSPITAL CARE,LEVL II: ICD-10-PCS | Mod: ,,, | Performed by: FAMILY MEDICINE

## 2023-06-28 RX ORDER — INSULIN ASPART 100 [IU]/ML
5 INJECTION, SOLUTION INTRAVENOUS; SUBCUTANEOUS
Status: DISCONTINUED | OUTPATIENT
Start: 2023-06-28 | End: 2023-07-07 | Stop reason: HOSPADM

## 2023-06-28 RX ADMIN — PANTOPRAZOLE SODIUM 40 MG: 40 TABLET, DELAYED RELEASE ORAL at 08:06

## 2023-06-28 RX ADMIN — ISOSORBIDE MONONITRATE 60 MG: 30 TABLET, EXTENDED RELEASE ORAL at 08:06

## 2023-06-28 RX ADMIN — HEPARIN SODIUM 5000 UNITS: 5000 INJECTION INTRAVENOUS; SUBCUTANEOUS at 06:06

## 2023-06-28 RX ADMIN — CARVEDILOL 25 MG: 25 TABLET, FILM COATED ORAL at 08:06

## 2023-06-28 RX ADMIN — FERROUS SULFATE TAB 325 MG (65 MG ELEMENTAL FE) 1 EACH: 325 (65 FE) TAB at 08:06

## 2023-06-28 RX ADMIN — AMPICILLIN SODIUM 2 G: 2 INJECTION, POWDER, FOR SOLUTION INTRAMUSCULAR; INTRAVENOUS at 03:06

## 2023-06-28 RX ADMIN — SEVELAMER CARBONATE 800 MG: 800 TABLET, FILM COATED ORAL at 12:06

## 2023-06-28 RX ADMIN — CLONIDINE HYDROCHLORIDE 0.2 MG: 0.2 TABLET ORAL at 02:06

## 2023-06-28 RX ADMIN — AMPICILLIN SODIUM 2 G: 2 INJECTION, POWDER, FOR SOLUTION INTRAMUSCULAR; INTRAVENOUS at 04:06

## 2023-06-28 RX ADMIN — HEPARIN SODIUM 5000 UNITS: 5000 INJECTION INTRAVENOUS; SUBCUTANEOUS at 09:06

## 2023-06-28 RX ADMIN — SEVELAMER CARBONATE 800 MG: 800 TABLET, FILM COATED ORAL at 05:06

## 2023-06-28 RX ADMIN — CLONIDINE HYDROCHLORIDE 0.2 MG: 0.2 TABLET ORAL at 08:06

## 2023-06-28 RX ADMIN — INSULIN ASPART 5 UNITS: 100 INJECTION, SOLUTION INTRAVENOUS; SUBCUTANEOUS at 12:06

## 2023-06-28 RX ADMIN — SEVELAMER CARBONATE 800 MG: 800 TABLET, FILM COATED ORAL at 06:06

## 2023-06-28 RX ADMIN — CARVEDILOL 25 MG: 25 TABLET, FILM COATED ORAL at 05:06

## 2023-06-28 RX ADMIN — AMLODIPINE BESYLATE 10 MG: 10 TABLET ORAL at 08:06

## 2023-06-28 RX ADMIN — DOCUSATE SODIUM 100 MG: 100 CAPSULE, LIQUID FILLED ORAL at 09:06

## 2023-06-28 RX ADMIN — DOCUSATE SODIUM 100 MG: 100 CAPSULE, LIQUID FILLED ORAL at 08:06

## 2023-06-28 RX ADMIN — LACTULOSE 20 G: 20 SOLUTION ORAL at 09:06

## 2023-06-28 RX ADMIN — LACTULOSE 20 G: 20 SOLUTION ORAL at 02:06

## 2023-06-28 RX ADMIN — HEPARIN SODIUM 5000 UNITS: 5000 INJECTION INTRAVENOUS; SUBCUTANEOUS at 02:06

## 2023-06-28 RX ADMIN — INSULIN DETEMIR 12 UNITS: 100 INJECTION, SOLUTION SUBCUTANEOUS at 09:06

## 2023-06-28 RX ADMIN — CLONIDINE HYDROCHLORIDE 0.2 MG: 0.2 TABLET ORAL at 09:06

## 2023-06-28 RX ADMIN — BUPROPION HYDROCHLORIDE 150 MG: 150 TABLET, FILM COATED, EXTENDED RELEASE ORAL at 09:06

## 2023-06-28 RX ADMIN — BUPROPION HYDROCHLORIDE 150 MG: 150 TABLET, FILM COATED, EXTENDED RELEASE ORAL at 08:06

## 2023-06-28 RX ADMIN — LOSARTAN POTASSIUM 100 MG: 100 TABLET, FILM COATED ORAL at 09:06

## 2023-06-28 NOTE — DIALYSIS ROUNDING
The patient was dialyzed today without complication.There was a net fluid removal of 3 liters during the treatment.He tolerated the treatment well.

## 2023-06-28 NOTE — SUBJECTIVE & OBJECTIVE
Interval History: 37 YEAR OLD MALE sitting in bed on his computer.Pt examined at bedside this am. He is resting comfortably in bed, in no acute distress, working on his laptop.  Pt is amenable to increasing his prandial insulin dose from 4 units to 5 units. Linzess scheduled to qa as he is c/o constipation. Diet message submitted for oatmeal on dialysis days.     Review of Systems  Objective:     Vital Signs (Most Recent):  Temp: 98.2 °F (36.8 °C) (06/28/23 0400)  Pulse: 62 (06/28/23 0400)  Resp: 18 (06/28/23 0400)  BP: (!) 134/51 (06/28/23 0400)  SpO2: 98 % (06/28/23 0400) Vital Signs (24h Range):  Temp:  [97.9 °F (36.6 °C)-98.6 °F (37 °C)] 98.2 °F (36.8 °C)  Pulse:  [62-71] 62  Resp:  [18-20] 18  SpO2:  [98 %-100 %] 98 %  BP: (134-163)/(38-58) 134/51     Weight: 75.5 kg (166 lb 7.2 oz)  Body mass index is 29.48 kg/m².    Intake/Output Summary (Last 24 hours) at 6/28/2023 0723  Last data filed at 6/28/2023 0550  Gross per 24 hour   Intake 690 ml   Output --   Net 690 ml         Physical Exam  Constitutional:       Appearance: Normal appearance. He is normal weight.   HENT:      Head: Normocephalic and atraumatic.   Eyes:      Extraocular Movements: Extraocular movements intact.      Pupils: Pupils are equal, round, and reactive to light.   Cardiovascular:      Rate and Rhythm: Normal rate.   Pulmonary:      Effort: Pulmonary effort is normal.      Breath sounds: Normal breath sounds.   Musculoskeletal:      Cervical back: Normal range of motion and neck supple.   Neurological:      Mental Status: He is alert and oriented to person, place, and time.   Psychiatric:         Mood and Affect: Mood normal.         Behavior: Behavior normal.         Thought Content: Thought content normal.         Judgment: Judgment normal.           Significant Labs: All pertinent labs within the past 24 hours have been reviewed.    Significant Imaging: I have reviewed all pertinent imaging results/findings within the past 24 hours.

## 2023-06-28 NOTE — ASSESSMENT & PLAN NOTE
06/05 - Surgery to see patient about foot  Has peripheral arterial disease  Arteriogram done by vascular surgery   PICC line due to poor iv access.  Adjusted his insulins up to get better control of his sugars

## 2023-06-28 NOTE — PROGRESS NOTES
[facility]  Nephrology  Progress Note    Patient Name: Lee Escobar  MRN: [id number]  Admission Date: 5/26/2023  Hospital Length of Stay: 32 days  Attending Provider: Donna Crar DO   Primary Care Physician: Maria Elena Solorio II, MD  Principal Problem:Osteomyelitis of left foot    Consults  Subjective:     Interval History: The patient has no complaints today    Review of patient's allergies indicates:  No Known Allergies  Current Facility-Administered Medications   Medication Frequency    0.9%  NaCl infusion PRN    acetaminophen tablet 650 mg Q4H PRN    ALPRAZolam tablet 0.5 mg TID PRN    amLODIPine tablet 10 mg Daily    ampicillin (OMNIPEN) 2 g in sodium chloride 0.9 % 100 mL IVPB (MB+) Q12H    buPROPion TBSR 12 hr tablet 150 mg BID    carvediloL tablet 25 mg BID WM    cloNIDine tablet 0.2 mg TID    dextrose 50% injection 12.5 g PRN    dextrose 50% injection 25 g PRN    docusate sodium capsule 100 mg BID    ferrous sulfate tablet 1 each Daily    glucagon (human recombinant) injection 1 mg PRN    glucose chewable tablet 16 g PRN    glucose chewable tablet 24 g PRN    heparin (porcine) injection 5,000 Units Q8H    hydrALAZINE tablet 25 mg Q8H PRN    insulin aspart U-100 injection 0-5 Units QID (AC + HS) PRN    insulin aspart U-100 injection 4 Units TIDWM    insulin detemir U-100 injection 12 Units QHS    isosorbide mononitrate 24 hr tablet 60 mg Daily    labetaloL injection 10 mg Q4H PRN    lactulose 20 gram/30 mL solution Soln 20 g TID    linaCLOtide capsule 145 mcg Daily PRN    losartan tablet 100 mg QHS    melatonin tablet 6 mg Nightly PRN    naloxone 0.4 mg/mL injection 0.02 mg PRN    ondansetron injection 4 mg Q8H PRN    pantoprazole EC tablet 40 mg Daily    sevelamer carbonate tablet 800 mg TIDWM    silver sulfADIAZINE 1% cream Daily PRN    sodium chloride 0.9% flush 10 mL Q12H PRN       Objective:     Vital Signs (Most Recent):  Temp: 97.9 °F (36.6 °C) (06/27/23 2000)  Pulse: 69  (06/27/23 2000)  Resp: 20 (06/27/23 2000)  BP: (!) 163/58 (06/27/23 2040)  SpO2: 98 % (06/27/23 2000) Vital Signs (24h Range):  Temp:  [97.9 °F (36.6 °C)-98.9 °F (37.2 °C)] 97.9 °F (36.6 °C)  Pulse:  [65-71] 69  Resp:  [16-20] 20  SpO2:  [97 %-100 %] 98 %  BP: (150-175)/(38-84) 163/58     Weight: 73.3 kg (161 lb 9.6 oz) (06/27/23 0500)  Body mass index is 28.63 kg/m².  Body surface area is 1.81 meters squared.    I/O last 3 completed shifts:  In: 1570 [P.O.:1070; Other:500]  Out: 3500 [Other:3500]    Physical Exam  Lungs-clear  Cor-2/6 systolic murmur  Abd-soft    Significant Labs:sureCBC: No results for input(s): WBC, RBC, HGB, HCT, PLT, MCV, MCH, MCHC in the last 168 hours.  CMP:   Recent Labs   Lab 06/27/23  0511   CREATININE 5.00*     All labs within the past 24 hours have been reviewed.    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]  Not Applicable     Chronic    Diabetic ulcer of toe of left foot associated with type 2 diabetes mellitus, with bone involvement without evidence of necrosis [E11.621, L97.526] 05/31/2023 Yes    Pressure injury of left heel, unstageable [L89.620] 05/31/2023 Yes    Pressure injury of right buttock, stage 2 [L89.312] 05/31/2023 Yes    HTN (hypertension) [I10] 05/25/2023 Yes     Chronic    ESRD (end stage renal disease) [N18.6] 05/25/2023 Yes    Wound infection [T14.8XXA, L08.9] 05/25/2023 Yes    Major depression [F32.9] 05/25/2023 Yes      Problems Resolved During this Admission:    Diagnosis Date Noted Date Resolved POA    Right flank pain [R10.9] 06/16/2023 06/18/2023 Yes    Type 2 diabetes mellitus with kidney complication, with long-term current use of insulin [E11.29, Z79.4] 05/25/2023 06/18/2023 Not Applicable     Chronic    Constipation [K59.00] 05/25/2023 06/26/2023 Yes     Chronic        Plan:  Hemodialysis tomorrow    Thank you for your consult. I will follow-up with patient. Please contact us if you have any additional questions.    Danny Peter MD  Nephrology  [facility]   ambulatory

## 2023-06-28 NOTE — PROGRESS NOTES
Wound care note:  Patient skin was evaluated today  Patient in bed, Alert.   VINOD Mendoza FNP assessed Right ischial and Left heel.   Cont silvadene to Left heel daily, improving  Right ischial slow improvement, moist, little macerated to edges. Slow improvement. Cont moist vashe with Drawtex to wound bed. Daily.   Cont turn protocol  Waffle boots to heels   On redistribution mattress   Wound care team to follow
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
## 2023-06-29 LAB
GLUCOSE SERPL-MCNC: 100 MG/DL (ref 70–105)
GLUCOSE SERPL-MCNC: 116 MG/DL (ref 70–105)
GLUCOSE SERPL-MCNC: 146 MG/DL (ref 70–105)
GLUCOSE SERPL-MCNC: 165 MG/DL (ref 70–105)

## 2023-06-29 PROCEDURE — 82962 GLUCOSE BLOOD TEST: CPT

## 2023-06-29 PROCEDURE — 63600175 PHARM REV CODE 636 W HCPCS: Performed by: NURSE PRACTITIONER

## 2023-06-29 PROCEDURE — 25000003 PHARM REV CODE 250: Performed by: FAMILY MEDICINE

## 2023-06-29 PROCEDURE — 11000001 HC ACUTE MED/SURG PRIVATE ROOM

## 2023-06-29 PROCEDURE — 25000003 PHARM REV CODE 250: Performed by: HOSPITALIST

## 2023-06-29 PROCEDURE — 99232 PR SUBSEQUENT HOSPITAL CARE,LEVL II: ICD-10-PCS | Mod: ,,, | Performed by: FAMILY MEDICINE

## 2023-06-29 PROCEDURE — A6212 FOAM DRG <=16 SQ IN W/BORDER: HCPCS

## 2023-06-29 PROCEDURE — 25000003 PHARM REV CODE 250: Performed by: NURSE PRACTITIONER

## 2023-06-29 PROCEDURE — 63600175 PHARM REV CODE 636 W HCPCS: Performed by: HOSPITALIST

## 2023-06-29 PROCEDURE — 99232 SBSQ HOSP IP/OBS MODERATE 35: CPT | Mod: ,,, | Performed by: FAMILY MEDICINE

## 2023-06-29 PROCEDURE — 63600175 PHARM REV CODE 636 W HCPCS: Performed by: FAMILY MEDICINE

## 2023-06-29 RX ADMIN — BUPROPION HYDROCHLORIDE 150 MG: 150 TABLET, FILM COATED, EXTENDED RELEASE ORAL at 08:06

## 2023-06-29 RX ADMIN — ISOSORBIDE MONONITRATE 60 MG: 30 TABLET, EXTENDED RELEASE ORAL at 08:06

## 2023-06-29 RX ADMIN — CARVEDILOL 25 MG: 25 TABLET, FILM COATED ORAL at 05:06

## 2023-06-29 RX ADMIN — DOCUSATE SODIUM 100 MG: 100 CAPSULE, LIQUID FILLED ORAL at 09:06

## 2023-06-29 RX ADMIN — AMPICILLIN SODIUM 2 G: 2 INJECTION, POWDER, FOR SOLUTION INTRAMUSCULAR; INTRAVENOUS at 04:06

## 2023-06-29 RX ADMIN — FERROUS SULFATE TAB 325 MG (65 MG ELEMENTAL FE) 1 EACH: 325 (65 FE) TAB at 08:06

## 2023-06-29 RX ADMIN — LACTULOSE 20 G: 20 SOLUTION ORAL at 09:06

## 2023-06-29 RX ADMIN — SEVELAMER CARBONATE 800 MG: 800 TABLET, FILM COATED ORAL at 06:06

## 2023-06-29 RX ADMIN — PANTOPRAZOLE SODIUM 40 MG: 40 TABLET, DELAYED RELEASE ORAL at 08:06

## 2023-06-29 RX ADMIN — DOCUSATE SODIUM 100 MG: 100 CAPSULE, LIQUID FILLED ORAL at 08:06

## 2023-06-29 RX ADMIN — BUPROPION HYDROCHLORIDE 150 MG: 150 TABLET, FILM COATED, EXTENDED RELEASE ORAL at 09:06

## 2023-06-29 RX ADMIN — HEPARIN SODIUM 5000 UNITS: 5000 INJECTION INTRAVENOUS; SUBCUTANEOUS at 09:06

## 2023-06-29 RX ADMIN — HEPARIN SODIUM 5000 UNITS: 5000 INJECTION INTRAVENOUS; SUBCUTANEOUS at 06:06

## 2023-06-29 RX ADMIN — AMPICILLIN SODIUM 2 G: 2 INJECTION, POWDER, FOR SOLUTION INTRAMUSCULAR; INTRAVENOUS at 03:06

## 2023-06-29 RX ADMIN — LINACLOTIDE 145 MCG: 145 CAPSULE, GELATIN COATED ORAL at 06:06

## 2023-06-29 RX ADMIN — LOSARTAN POTASSIUM 100 MG: 100 TABLET, FILM COATED ORAL at 09:06

## 2023-06-29 RX ADMIN — SEVELAMER CARBONATE 800 MG: 800 TABLET, FILM COATED ORAL at 11:06

## 2023-06-29 RX ADMIN — CLONIDINE HYDROCHLORIDE 0.2 MG: 0.2 TABLET ORAL at 02:06

## 2023-06-29 RX ADMIN — CLONIDINE HYDROCHLORIDE 0.2 MG: 0.2 TABLET ORAL at 09:06

## 2023-06-29 RX ADMIN — SEVELAMER CARBONATE 800 MG: 800 TABLET, FILM COATED ORAL at 05:06

## 2023-06-29 RX ADMIN — SILVER SULFADIAZINE: 10 CREAM TOPICAL at 03:06

## 2023-06-29 RX ADMIN — HEPARIN SODIUM 5000 UNITS: 5000 INJECTION INTRAVENOUS; SUBCUTANEOUS at 02:06

## 2023-06-29 RX ADMIN — INSULIN DETEMIR 10 UNITS: 100 INJECTION, SOLUTION SUBCUTANEOUS at 09:06

## 2023-06-29 RX ADMIN — AMLODIPINE BESYLATE 10 MG: 10 TABLET ORAL at 08:06

## 2023-06-29 RX ADMIN — CARVEDILOL 25 MG: 25 TABLET, FILM COATED ORAL at 08:06

## 2023-06-29 RX ADMIN — CLONIDINE HYDROCHLORIDE 0.2 MG: 0.2 TABLET ORAL at 08:06

## 2023-06-29 RX ADMIN — LACTULOSE 20 G: 20 SOLUTION ORAL at 02:06

## 2023-06-29 NOTE — PLAN OF CARE
Chart reviewed and discussed with Dr. Carr. Continue IV abx through 7/7/23. Per Dr. Carr, patient to remain in [facility] until completion of IV abx. SS following.
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
## 2023-06-29 NOTE — ASSESSMENT & PLAN NOTE
1st and 2nd toe Left foot (acute) Osteomyelitis. Complicated by moderate vascular disease of Lower extremities.  CTA Bilateral lower extremities reveals moderate to severe calcified vascular disease affects the arteries of the bilateral lower extremities.    -medical management with Vancomycin and cefepime started on 5/25/23  -wound care consulted    5/29: may require biopsy and debridement by general surgery.  Per their last note, follow-up will be this week.      5/30: Wound culture with Proteus and Enterococcus both sensitive to ampicillin, with adjust therapy    Antibiotics (From admission, onward)    Start     Stop Route Frequency Ordered    06/10/23 1700  ampicillin (OMNIPEN) 2 g in sodium chloride 0.9 % 100 mL IVPB (MB+)         07/07 1559 IV Every 12 hours 06/10/23 1650    06/09/23 1526  silver sulfADIAZINE 1% cream         -- Top Daily PRN 06/09/23 1431    05/29/23 1130  mupirocin 2 % ointment         06/03 0859 Nasl 2 times daily 05/29/23 1028        6/19 - Course of abx continues.     6/20 - Wound care notes reviewed, continue abx and wound care.     6/24 - Tolerating abx without side effects. Completes 7/6/23 6/29 no problems with itching or antibiotics iv antibiotics 7/6/2023

## 2023-06-29 NOTE — PROGRESS NOTES
[facility]  Nephrology  Progress Note    Patient Name: Lee Escobar  MRN: [id number]  Admission Date: 5/26/2023  Hospital Length of Stay: 34 days  Attending Provider: Donna Carr DO   Primary Care Physician: Maria Elena Solorio II, MD  Principal Problem:Osteomyelitis of left foot    Consults  Subjective:     Interval History: The patient has no complaints today    Review of patient's allergies indicates:  No Known Allergies  Current Facility-Administered Medications   Medication Frequency    0.9%  NaCl infusion PRN    acetaminophen tablet 650 mg Q4H PRN    ALPRAZolam tablet 0.5 mg TID PRN    amLODIPine tablet 10 mg Daily    ampicillin (OMNIPEN) 2 g in sodium chloride 0.9 % 100 mL IVPB (MB+) Q12H    buPROPion TBSR 12 hr tablet 150 mg BID    carvediloL tablet 25 mg BID WM    cloNIDine tablet 0.2 mg TID    dextrose 50% injection 12.5 g PRN    dextrose 50% injection 25 g PRN    docusate sodium capsule 100 mg BID    ferrous sulfate tablet 1 each Daily    glucagon (human recombinant) injection 1 mg PRN    glucose chewable tablet 16 g PRN    glucose chewable tablet 24 g PRN    heparin (porcine) injection 5,000 Units Q8H    hydrALAZINE tablet 25 mg Q8H PRN    insulin aspart U-100 injection 0-5 Units QID (AC + HS) PRN    insulin aspart U-100 injection 5 Units TIDWM    insulin detemir U-100 injection 10 Units QHS    isosorbide mononitrate 24 hr tablet 60 mg Daily    labetaloL injection 10 mg Q4H PRN    lactulose 20 gram/30 mL solution Soln 20 g TID    linaCLOtide capsule 145 mcg Daily PRN    linaCLOtide capsule 145 mcg Before breakfast    losartan tablet 100 mg QHS    melatonin tablet 6 mg Nightly PRN    naloxone 0.4 mg/mL injection 0.02 mg PRN    ondansetron injection 4 mg Q8H PRN    pantoprazole EC tablet 40 mg Daily    sevelamer carbonate tablet 800 mg TIDWM    silver sulfADIAZINE 1% cream Daily PRN    sodium chloride 0.9% flush 10 mL Q12H PRN       Objective:     Vital Signs (Most  Recent):  Temp: 98.5 °F (36.9 °C) (06/29/23 1600)  Pulse: 66 (06/29/23 1600)  Resp: 18 (06/29/23 1600)  BP: (!) 166/61 (06/29/23 1600)  SpO2: 100 % (06/29/23 1600) Vital Signs (24h Range):  Temp:  [98 °F (36.7 °C)-98.5 °F (36.9 °C)] 98.5 °F (36.9 °C)  Pulse:  [66-70] 66  Resp:  [18] 18  SpO2:  [96 %-100 %] 100 %  BP: (159-174)/(46-69) 166/61     Weight: 73.9 kg (162 lb 14.7 oz) (06/29/23 0400)  Body mass index is 28.86 kg/m².  Body surface area is 1.81 meters squared.    I/O last 3 completed shifts:  In: 1800 [P.O.:1200; Other:500; IV Piggyback:100]  Out: 2500 [Other:2500]    Physical Exam  Lungs-clear  Cor-2/6 systolic murmur no rub  Abd-soft     Significant Labs:sureCBC:   Recent Labs   Lab 06/28/23  1629   WBC 7.33   RBC 2.76*   HGB 7.6*   HCT 24.0*      MCV 87.0   MCH 27.5   MCHC 31.7*     CMP:   Recent Labs   Lab 06/28/23  1629   GLU 42*   CALCIUM 8.5      K 4.0   CO2 28      BUN 67*   CREATININE 4.90*     All labs within the past 24 hours have been reviewed.    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]  Not Applicable     Chronic    Diabetic ulcer of toe of left foot associated with type 2 diabetes mellitus, with bone involvement without evidence of necrosis [E11.621, L97.526] 05/31/2023 Yes    Pressure injury of left heel, unstageable [L89.620] 05/31/2023 Yes    Pressure injury of right buttock, stage 2 [L89.312] 05/31/2023 Yes    HTN (hypertension) [I10] 05/25/2023 Yes     Chronic    ESRD (end stage renal disease) [N18.6] 05/25/2023 Yes    Wound infection [T14.8XXA, L08.9] 05/25/2023 Yes    Major depression [F32.9] 05/25/2023 Yes      Problems Resolved During this Admission:    Diagnosis Date Noted Date Resolved POA    Right flank pain [R10.9] 06/16/2023 06/18/2023 Yes    Type 2 diabetes mellitus with kidney  complication, with long-term current use of insulin [E11.29, Z79.4] 05/25/2023 06/18/2023 Not Applicable     Chronic    Constipation [K59.00] 05/25/2023 06/26/2023 Yes     Chronic       Plan;  Hemodialysis tomorrow    Thank you for your consult. I will follow-up with patient. Please contact us if you have any additional questions.    Danny Peter MD  Nephrology  [facility]

## 2023-06-29 NOTE — ASSESSMENT & PLAN NOTE
T 5 paraplegic.  Regaining some function. Recommend PMR referral placed for d/c. Discussed [facility] at discharge.

## 2023-06-29 NOTE — PLAN OF CARE
Problem: Adult Inpatient Plan of Care  Goal: Plan of Care Review  Outcome: Ongoing, Progressing     Problem: Impaired Wound Healing  Goal: Optimal Wound Healing  Outcome: Ongoing, Progressing

## 2023-06-29 NOTE — SUBJECTIVE & OBJECTIVE
Interval History: 37 year old male one episode of low blood sugar yesterday. Resolved with a juice. Took his linzess yesterday no response yet think eating oatmeal more often will help that has been ordered.he relates he has anemia chronic disease.    Review of Systems   Constitutional:  Negative for activity change.   Respiratory:  Negative for chest tightness.    Musculoskeletal:  Positive for gait problem.   Neurological:  Negative for dizziness.   Psychiatric/Behavioral:  Negative for agitation and behavioral problems.    Objective:     Vital Signs (Most Recent):  Temp: 98.4 °F (36.9 °C) (06/29/23 1145)  Pulse: 67 (06/29/23 1145)  Resp: 18 (06/29/23 1145)  BP: (!) 159/61 (06/29/23 1145)  SpO2: 98 % (06/29/23 1145) Vital Signs (24h Range):  Temp:  [98 °F (36.7 °C)-98.4 °F (36.9 °C)] 98.4 °F (36.9 °C)  Pulse:  [67-71] 67  Resp:  [18] 18  SpO2:  [96 %-99 %] 98 %  BP: (155-174)/(42-69) 159/61     Weight: 73.9 kg (162 lb 14.7 oz)  Body mass index is 28.86 kg/m².    Intake/Output Summary (Last 24 hours) at 6/29/2023 1227  Last data filed at 6/29/2023 0825  Gross per 24 hour   Intake 1200 ml   Output --   Net 1200 ml         Physical Exam  Constitutional:       Appearance: Normal appearance. He is normal weight.   HENT:      Head: Normocephalic and atraumatic.   Eyes:      Extraocular Movements: Extraocular movements intact.      Pupils: Pupils are equal, round, and reactive to light.   Cardiovascular:      Rate and Rhythm: Normal rate and regular rhythm.   Pulmonary:      Effort: No respiratory distress.   Abdominal:      General: Abdomen is flat.   Musculoskeletal:      Cervical back: Normal range of motion.   Neurological:      Mental Status: He is alert.           Significant Labs: All pertinent labs within the past 24 hours have been reviewed.    Significant Imaging: I have reviewed all pertinent imaging results/findings within the past 24 hours.

## 2023-06-29 NOTE — ASSESSMENT & PLAN NOTE
Improved with Imdur, still some hypertension. Will adjust dose clonidine  and monitor.not at goal this morning second dose clonidine planned shortly. Improving.

## 2023-06-29 NOTE — ASSESSMENT & PLAN NOTE
06/05 - Surgery to see patient about foot  Has peripheral arterial disease  Arteriogram done by vascular surgery   PICC line due to poor iv access.  Adjusted his insulins up to get better control of his sugars   One episode of hypoglycemia he promised not to skip meals.adjusted his levemir from 12 to 10.

## 2023-06-29 NOTE — PLAN OF CARE
Problem: Adult Inpatient Plan of Care  Goal: Plan of Care Review  Outcome: Ongoing, Progressing  Goal: Patient-Specific Goal (Individualized)  Outcome: Ongoing, Progressing  Goal: Absence of Hospital-Acquired Illness or Injury  Outcome: Ongoing, Progressing  Goal: Optimal Comfort and Wellbeing  Outcome: Ongoing, Progressing  Goal: Readiness for Transition of Care  Outcome: Ongoing, Progressing     Problem: Diabetes Comorbidity  Goal: Blood Glucose Level Within Targeted Range  Outcome: Ongoing, Progressing     Problem: Impaired Wound Healing  Goal: Optimal Wound Healing  Outcome: Ongoing, Progressing     Problem: Fall Injury Risk  Goal: Absence of Fall and Fall-Related Injury  Outcome: Ongoing, Progressing     Problem: Infection  Goal: Absence of Infection Signs and Symptoms  Outcome: Ongoing, Progressing     Problem: Skin Injury Risk Increased  Goal: Skin Health and Integrity  Outcome: Ongoing, Progressing     Problem: Device-Related Complication Risk (Hemodialysis)  Goal: Safe, Effective Therapy Delivery  Outcome: Ongoing, Progressing     Problem: Hemodynamic Instability (Hemodialysis)  Goal: Effective Tissue Perfusion  Outcome: Ongoing, Progressing     Problem: Infection (Hemodialysis)  Goal: Absence of Infection Signs and Symptoms  Outcome: Ongoing, Progressing     Problem: Adjustment to Illness (Sepsis/Septic Shock)  Goal: Optimal Coping  Outcome: Ongoing, Progressing     Problem: Bleeding (Sepsis/Septic Shock)  Goal: Absence of Bleeding  Outcome: Ongoing, Progressing     Problem: Glycemic Control Impaired (Sepsis/Septic Shock)  Goal: Blood Glucose Level Within Desired Range  Outcome: Ongoing, Progressing     Problem: Infection Progression (Sepsis/Septic Shock)  Goal: Absence of Infection Signs and Symptoms  Outcome: Ongoing, Progressing     Problem: Nutrition Impaired (Sepsis/Septic Shock)  Goal: Optimal Nutrition Intake  Outcome: Ongoing, Progressing

## 2023-06-30 LAB
GLUCOSE SERPL-MCNC: 114 MG/DL (ref 70–105)
GLUCOSE SERPL-MCNC: 128 MG/DL (ref 70–105)
GLUCOSE SERPL-MCNC: 178 MG/DL (ref 70–105)
GLUCOSE SERPL-MCNC: 97 MG/DL (ref 70–105)

## 2023-06-30 PROCEDURE — 36416 COLLJ CAPILLARY BLOOD SPEC: CPT

## 2023-06-30 PROCEDURE — 11000001 HC ACUTE MED/SURG PRIVATE ROOM

## 2023-06-30 PROCEDURE — 63600175 PHARM REV CODE 636 W HCPCS: Performed by: FAMILY MEDICINE

## 2023-06-30 PROCEDURE — 99232 PR SUBSEQUENT HOSPITAL CARE,LEVL II: ICD-10-PCS | Mod: ,,, | Performed by: FAMILY MEDICINE

## 2023-06-30 PROCEDURE — 82962 GLUCOSE BLOOD TEST: CPT

## 2023-06-30 PROCEDURE — 25000003 PHARM REV CODE 250: Performed by: FAMILY MEDICINE

## 2023-06-30 PROCEDURE — 25000003 PHARM REV CODE 250: Performed by: HOSPITALIST

## 2023-06-30 PROCEDURE — 63600175 PHARM REV CODE 636 W HCPCS: Performed by: NURSE PRACTITIONER

## 2023-06-30 PROCEDURE — 63600175 PHARM REV CODE 636 W HCPCS: Performed by: HOSPITALIST

## 2023-06-30 PROCEDURE — 99232 SBSQ HOSP IP/OBS MODERATE 35: CPT | Mod: ,,, | Performed by: FAMILY MEDICINE

## 2023-06-30 PROCEDURE — 25000003 PHARM REV CODE 250: Performed by: NURSE PRACTITIONER

## 2023-06-30 PROCEDURE — 90935 HEMODIALYSIS ONE EVALUATION: CPT

## 2023-06-30 RX ORDER — BISACODYL 10 MG
10 SUPPOSITORY, RECTAL RECTAL DAILY
Status: DISCONTINUED | OUTPATIENT
Start: 2023-06-30 | End: 2023-07-07 | Stop reason: HOSPADM

## 2023-06-30 RX ADMIN — CARVEDILOL 25 MG: 25 TABLET, FILM COATED ORAL at 08:06

## 2023-06-30 RX ADMIN — LACTULOSE 20 G: 20 SOLUTION ORAL at 08:06

## 2023-06-30 RX ADMIN — ISOSORBIDE MONONITRATE 60 MG: 30 TABLET, EXTENDED RELEASE ORAL at 08:06

## 2023-06-30 RX ADMIN — LACTULOSE 20 G: 20 SOLUTION ORAL at 04:06

## 2023-06-30 RX ADMIN — PANTOPRAZOLE SODIUM 40 MG: 40 TABLET, DELAYED RELEASE ORAL at 08:06

## 2023-06-30 RX ADMIN — CARVEDILOL 25 MG: 25 TABLET, FILM COATED ORAL at 04:06

## 2023-06-30 RX ADMIN — AMPICILLIN SODIUM 2 G: 2 INJECTION, POWDER, FOR SOLUTION INTRAMUSCULAR; INTRAVENOUS at 04:06

## 2023-06-30 RX ADMIN — DOCUSATE SODIUM 100 MG: 100 CAPSULE, LIQUID FILLED ORAL at 08:06

## 2023-06-30 RX ADMIN — CLONIDINE HYDROCHLORIDE 0.2 MG: 0.2 TABLET ORAL at 08:06

## 2023-06-30 RX ADMIN — HEPARIN SODIUM 5000 UNITS: 5000 INJECTION INTRAVENOUS; SUBCUTANEOUS at 04:06

## 2023-06-30 RX ADMIN — BUPROPION HYDROCHLORIDE 150 MG: 150 TABLET, FILM COATED, EXTENDED RELEASE ORAL at 08:06

## 2023-06-30 RX ADMIN — SEVELAMER CARBONATE 800 MG: 800 TABLET, FILM COATED ORAL at 06:06

## 2023-06-30 RX ADMIN — AMLODIPINE BESYLATE 10 MG: 10 TABLET ORAL at 08:06

## 2023-06-30 RX ADMIN — INSULIN ASPART 5 UNITS: 100 INJECTION, SOLUTION INTRAVENOUS; SUBCUTANEOUS at 05:06

## 2023-06-30 RX ADMIN — HEPARIN SODIUM 5000 UNITS: 5000 INJECTION INTRAVENOUS; SUBCUTANEOUS at 06:06

## 2023-06-30 RX ADMIN — CLONIDINE HYDROCHLORIDE 0.2 MG: 0.2 TABLET ORAL at 04:06

## 2023-06-30 RX ADMIN — FERROUS SULFATE TAB 325 MG (65 MG ELEMENTAL FE) 1 EACH: 325 (65 FE) TAB at 08:06

## 2023-06-30 RX ADMIN — LOSARTAN POTASSIUM 100 MG: 100 TABLET, FILM COATED ORAL at 08:06

## 2023-06-30 RX ADMIN — SEVELAMER CARBONATE 800 MG: 800 TABLET, FILM COATED ORAL at 04:06

## 2023-06-30 RX ADMIN — HEPARIN SODIUM 5000 UNITS: 5000 INJECTION INTRAVENOUS; SUBCUTANEOUS at 09:06

## 2023-06-30 RX ADMIN — SEVELAMER CARBONATE 800 MG: 800 TABLET, FILM COATED ORAL at 11:06

## 2023-06-30 RX ADMIN — BISACODYL 10 MG: 10 SUPPOSITORY RECTAL at 04:06

## 2023-06-30 NOTE — PROGRESS NOTES
Patient complains of constipation.  Review of systems pulmonary-he denies shortness of breath     Physical exam general patient in acute distress    Assessment/plan 1.  ESRD-patient is to dialyze today  2.  Anemia-patient's hematocrit is 32%, continue to monitor   3. Hypertension-patient's blood pressure is 185/91 today, continue present antihypertensives   4. Osteomyelitis-continue antibiotics  Five.  Diabetes mellitus-continue present hypoglycemic regimen, patient's glucose before lunch was 97  6. Secondary hyperparathyroidism-continue Renvela with meals

## 2023-07-01 LAB
GLUCOSE SERPL-MCNC: 111 MG/DL (ref 70–105)
GLUCOSE SERPL-MCNC: 152 MG/DL (ref 70–105)
GLUCOSE SERPL-MCNC: 173 MG/DL (ref 70–105)
GLUCOSE SERPL-MCNC: 99 MG/DL (ref 70–105)

## 2023-07-01 PROCEDURE — 25000003 PHARM REV CODE 250: Performed by: NURSE PRACTITIONER

## 2023-07-01 PROCEDURE — 63600175 PHARM REV CODE 636 W HCPCS: Performed by: NURSE PRACTITIONER

## 2023-07-01 PROCEDURE — 99232 PR SUBSEQUENT HOSPITAL CARE,LEVL II: ICD-10-PCS | Mod: ,,, | Performed by: FAMILY MEDICINE

## 2023-07-01 PROCEDURE — 63600175 PHARM REV CODE 636 W HCPCS: Performed by: FAMILY MEDICINE

## 2023-07-01 PROCEDURE — 25000003 PHARM REV CODE 250

## 2023-07-01 PROCEDURE — 25000003 PHARM REV CODE 250: Performed by: FAMILY MEDICINE

## 2023-07-01 PROCEDURE — 63600175 PHARM REV CODE 636 W HCPCS: Performed by: HOSPITALIST

## 2023-07-01 PROCEDURE — 11000001 HC ACUTE MED/SURG PRIVATE ROOM

## 2023-07-01 PROCEDURE — 25000003 PHARM REV CODE 250: Performed by: HOSPITALIST

## 2023-07-01 PROCEDURE — 99232 SBSQ HOSP IP/OBS MODERATE 35: CPT | Mod: ,,, | Performed by: FAMILY MEDICINE

## 2023-07-01 PROCEDURE — 82962 GLUCOSE BLOOD TEST: CPT

## 2023-07-01 RX ADMIN — SEVELAMER CARBONATE 800 MG: 800 TABLET, FILM COATED ORAL at 04:07

## 2023-07-01 RX ADMIN — INSULIN ASPART 5 UNITS: 100 INJECTION, SOLUTION INTRAVENOUS; SUBCUTANEOUS at 06:07

## 2023-07-01 RX ADMIN — LINACLOTIDE 145 MCG: 145 CAPSULE, GELATIN COATED ORAL at 05:07

## 2023-07-01 RX ADMIN — AMPICILLIN SODIUM 2 G: 2 INJECTION, POWDER, FOR SOLUTION INTRAMUSCULAR; INTRAVENOUS at 04:07

## 2023-07-01 RX ADMIN — CLONIDINE HYDROCHLORIDE 0.3 MG: 0.2 TABLET ORAL at 09:07

## 2023-07-01 RX ADMIN — FERROUS SULFATE TAB 325 MG (65 MG ELEMENTAL FE) 1 EACH: 325 (65 FE) TAB at 08:07

## 2023-07-01 RX ADMIN — DOCUSATE SODIUM 100 MG: 100 CAPSULE, LIQUID FILLED ORAL at 08:07

## 2023-07-01 RX ADMIN — AMLODIPINE BESYLATE 10 MG: 10 TABLET ORAL at 08:07

## 2023-07-01 RX ADMIN — LOSARTAN POTASSIUM 100 MG: 100 TABLET, FILM COATED ORAL at 09:07

## 2023-07-01 RX ADMIN — HEPARIN SODIUM 5000 UNITS: 5000 INJECTION INTRAVENOUS; SUBCUTANEOUS at 02:07

## 2023-07-01 RX ADMIN — SEVELAMER CARBONATE 800 MG: 800 TABLET, FILM COATED ORAL at 12:07

## 2023-07-01 RX ADMIN — LACTULOSE 20 G: 20 SOLUTION ORAL at 02:07

## 2023-07-01 RX ADMIN — ISOSORBIDE MONONITRATE 60 MG: 30 TABLET, EXTENDED RELEASE ORAL at 08:07

## 2023-07-01 RX ADMIN — DOCUSATE SODIUM 100 MG: 100 CAPSULE, LIQUID FILLED ORAL at 09:07

## 2023-07-01 RX ADMIN — CARVEDILOL 25 MG: 25 TABLET, FILM COATED ORAL at 08:07

## 2023-07-01 RX ADMIN — INSULIN ASPART 5 UNITS: 100 INJECTION, SOLUTION INTRAVENOUS; SUBCUTANEOUS at 04:07

## 2023-07-01 RX ADMIN — BUPROPION HYDROCHLORIDE 150 MG: 150 TABLET, FILM COATED, EXTENDED RELEASE ORAL at 08:07

## 2023-07-01 RX ADMIN — CARVEDILOL 25 MG: 25 TABLET, FILM COATED ORAL at 04:07

## 2023-07-01 RX ADMIN — BUPROPION HYDROCHLORIDE 150 MG: 150 TABLET, FILM COATED, EXTENDED RELEASE ORAL at 09:07

## 2023-07-01 RX ADMIN — CLONIDINE HYDROCHLORIDE 0.2 MG: 0.2 TABLET ORAL at 08:07

## 2023-07-01 RX ADMIN — HEPARIN SODIUM 5000 UNITS: 5000 INJECTION INTRAVENOUS; SUBCUTANEOUS at 09:07

## 2023-07-01 RX ADMIN — CLONIDINE HYDROCHLORIDE 0.3 MG: 0.2 TABLET ORAL at 02:07

## 2023-07-01 RX ADMIN — PANTOPRAZOLE SODIUM 40 MG: 40 TABLET, DELAYED RELEASE ORAL at 08:07

## 2023-07-01 RX ADMIN — AMPICILLIN SODIUM 2 G: 2 INJECTION, POWDER, FOR SOLUTION INTRAMUSCULAR; INTRAVENOUS at 05:07

## 2023-07-01 RX ADMIN — SEVELAMER CARBONATE 800 MG: 800 TABLET, FILM COATED ORAL at 06:07

## 2023-07-01 RX ADMIN — LACTULOSE 20 G: 20 SOLUTION ORAL at 08:07

## 2023-07-01 RX ADMIN — BISACODYL 10 MG: 10 SUPPOSITORY RECTAL at 08:07

## 2023-07-01 RX ADMIN — LABETALOL HYDROCHLORIDE 10 MG: 5 INJECTION, SOLUTION INTRAVENOUS at 05:07

## 2023-07-01 RX ADMIN — INSULIN DETEMIR 10 UNITS: 100 INJECTION, SOLUTION SUBCUTANEOUS at 09:07

## 2023-07-01 RX ADMIN — HEPARIN SODIUM 5000 UNITS: 5000 INJECTION INTRAVENOUS; SUBCUTANEOUS at 05:07

## 2023-07-01 NOTE — SUBJECTIVE & OBJECTIVE
Interval History: 37 year old male here for infusion antibiotics relates that no BMS since last week despite taking several medications and linzess yesterday and starting to feel uncomfortable in the abdomen,request an enema today.    Review of Systems   Constitutional:  Negative for activity change.   Eyes:  Negative for redness.   Respiratory:  Negative for cough.    Cardiovascular:  Negative for chest pain.   Gastrointestinal:  Positive for constipation.   Musculoskeletal:  Positive for gait problem.   Psychiatric/Behavioral:  Negative for agitation, behavioral problems, confusion, decreased concentration, dysphoric mood and hallucinations. The patient is not nervous/anxious and is not hyperactive.    Objective:     Vital Signs (Most Recent):  Temp: 98.6 °F (37 °C) (06/30/23 0730)  Pulse: 75 (06/30/23 1505)  Resp: 18 (06/30/23 1505)  BP: (!) 201/76 (06/30/23 1505)  SpO2: 98 % (06/30/23 0730) Vital Signs (24h Range):  Temp:  [98.1 °F (36.7 °C)-98.6 °F (37 °C)] 98.6 °F (37 °C)  Pulse:  [65-75] 75  Resp:  [18] 18  SpO2:  [97 %-98 %] 98 %  BP: (163-201)/(60-91) 201/76     Weight: 74.8 kg (164 lb 14.5 oz)  Body mass index is 29.21 kg/m².    Intake/Output Summary (Last 24 hours) at 6/30/2023 1906  Last data filed at 6/30/2023 1505  Gross per 24 hour   Intake 1080 ml   Output 3000 ml   Net -1920 ml         Physical Exam        Significant Labs: All pertinent labs within the past 24 hours have been reviewed.    Significant Imaging: I have reviewed all pertinent imaging results/findings within the past 24 hours.

## 2023-07-01 NOTE — PROGRESS NOTES
Patient is without complaints  Physical exam general patient in acute distress    Assessment/plan 1.  ESRD-patient dialyzed yesterday, will continue HD support on a Monday Wednesday Friday basis   2.  Anemia-patient's hematocrit is 32%, continue to monitor   3. Hypertension-continue present antihypertensives   4. Osteomyelitis-continue antibiotics  Five.  Diabetes mellitus-continue present hypoglycemic regimen, patient's glucose before lunch was 97  6. Secondary hyperparathyroidism-continue Renvela with meals
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
## 2023-07-01 NOTE — SUBJECTIVE & OBJECTIVE
Interval History: 37 year old male here for iv antibiotics had two small liquids Bms after yesterdays enema. No complaints voiced today.full sugar coke at the bedside.     Review of Systems   Constitutional:  Negative for activity change.   HENT:  Negative for congestion.    Eyes:  Negative for redness.   Respiratory:  Negative for chest tightness.    Cardiovascular:  Negative for chest pain.   Gastrointestinal:  Positive for constipation.   Endocrine: Positive for cold intolerance.   Musculoskeletal:  Positive for gait problem.   Neurological:  Negative for dizziness.   Psychiatric/Behavioral:  Negative for agitation, behavioral problems, confusion, decreased concentration, dysphoric mood and hallucinations. The patient is not hyperactive.    Objective:     Vital Signs (Most Recent):  Temp: 99.3 °F (37.4 °C) (07/01/23 0840)  Pulse: 96 (07/01/23 0840)  Resp: 18 (07/01/23 0840)  BP: (!) 158/85 (07/01/23 0840)  SpO2: 98 % (07/01/23 0840) Vital Signs (24h Range):  Temp:  [97.8 °F (36.6 °C)-99.3 °F (37.4 °C)] 99.3 °F (37.4 °C)  Pulse:  [65-96] 96  Resp:  [18-19] 18  SpO2:  [98 %-99 %] 98 %  BP: (157-201)/(50-85) 158/85     Weight: 75.3 kg (166 lb)  Body mass index is 29.41 kg/m².    Intake/Output Summary (Last 24 hours) at 7/1/2023 1119  Last data filed at 7/1/2023 0800  Gross per 24 hour   Intake 340 ml   Output 3000 ml   Net -2660 ml         Physical Exam  Constitutional:       Appearance: Normal appearance. He is normal weight.   HENT:      Head: Normocephalic and atraumatic.   Eyes:      Extraocular Movements: Extraocular movements intact.      Conjunctiva/sclera: Conjunctivae normal.      Pupils: Pupils are equal, round, and reactive to light.   Cardiovascular:      Rate and Rhythm: Normal rate and regular rhythm.   Pulmonary:      Effort: Pulmonary effort is normal.      Breath sounds: Normal breath sounds.   Abdominal:      General: Abdomen is flat.      Palpations: Abdomen is soft.   Musculoskeletal:          General: No swelling.   Skin:     General: Skin is warm and dry.      Capillary Refill: Capillary refill takes less than 2 seconds.   Neurological:      Mental Status: He is alert.   Psychiatric:         Mood and Affect: Mood normal.         Behavior: Behavior normal.         Thought Content: Thought content normal.         Judgment: Judgment normal.           Significant Labs: All pertinent labs within the past 24 hours have been reviewed.  BMP: No results for input(s): GLU, NA, K, CL, CO2, BUN, CREATININE, CALCIUM, MG in the last 48 hours.  CBC: No results for input(s): WBC, HGB, HCT, PLT in the last 48 hours.  CMP: No results for input(s): NA, K, CL, CO2, GLU, BUN, CREATININE, CALCIUM, PROT, ALBUMIN, BILITOT, ALKPHOS, AST, ALT, ANIONGAP, EGFRNONAA in the last 48 hours.    Invalid input(s): ESTGFA[place]    Significant Imaging: I have reviewed all pertinent imaging results/findings within the past 24 hours.

## 2023-07-01 NOTE — ASSESSMENT & PLAN NOTE
Improved with Imdur, still some hypertension. Will adjust dose clonidine  and monitor.not at goal this morning second dose clonidine planned shortly. Will adjust clonidine up again.
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
## 2023-07-01 NOTE — ASSESSMENT & PLAN NOTE
06/05 - Surgery to see patient about foot  Has peripheral arterial disease  Arteriogram done by vascular surgery   PICC line due to poor iv access.  Adjusted his insulins up to get better control of his sugars   One episode of hypoglycemia he promised not to skip meals.adjusted his levemir from 12 to 10.   7/1 On iv antibiotics for infection

## 2023-07-02 LAB
GLUCOSE SERPL-MCNC: 108 MG/DL (ref 70–105)
GLUCOSE SERPL-MCNC: 121 MG/DL (ref 70–105)
GLUCOSE SERPL-MCNC: 172 MG/DL (ref 70–105)
GLUCOSE SERPL-MCNC: 53 MG/DL (ref 70–105)
GLUCOSE SERPL-MCNC: 59 MG/DL (ref 70–105)
GLUCOSE SERPL-MCNC: 93 MG/DL (ref 70–105)

## 2023-07-02 PROCEDURE — 63600175 PHARM REV CODE 636 W HCPCS: Performed by: NURSE PRACTITIONER

## 2023-07-02 PROCEDURE — 99232 SBSQ HOSP IP/OBS MODERATE 35: CPT | Mod: GC,,, | Performed by: FAMILY MEDICINE

## 2023-07-02 PROCEDURE — 25000003 PHARM REV CODE 250: Performed by: INTERNAL MEDICINE

## 2023-07-02 PROCEDURE — 63600175 PHARM REV CODE 636 W HCPCS: Performed by: HOSPITALIST

## 2023-07-02 PROCEDURE — 63600175 PHARM REV CODE 636 W HCPCS

## 2023-07-02 PROCEDURE — 25000003 PHARM REV CODE 250: Performed by: HOSPITALIST

## 2023-07-02 PROCEDURE — 25000003 PHARM REV CODE 250: Performed by: NURSE PRACTITIONER

## 2023-07-02 PROCEDURE — 63600175 PHARM REV CODE 636 W HCPCS: Performed by: FAMILY MEDICINE

## 2023-07-02 PROCEDURE — 25000003 PHARM REV CODE 250: Performed by: FAMILY MEDICINE

## 2023-07-02 PROCEDURE — 99232 PR SUBSEQUENT HOSPITAL CARE,LEVL II: ICD-10-PCS | Mod: GC,,, | Performed by: FAMILY MEDICINE

## 2023-07-02 PROCEDURE — 82962 GLUCOSE BLOOD TEST: CPT

## 2023-07-02 PROCEDURE — 11000001 HC ACUTE MED/SURG PRIVATE ROOM

## 2023-07-02 RX ADMIN — BUPROPION HYDROCHLORIDE 150 MG: 150 TABLET, FILM COATED, EXTENDED RELEASE ORAL at 08:07

## 2023-07-02 RX ADMIN — PANTOPRAZOLE SODIUM 40 MG: 40 TABLET, DELAYED RELEASE ORAL at 08:07

## 2023-07-02 RX ADMIN — ISOSORBIDE MONONITRATE 60 MG: 30 TABLET, EXTENDED RELEASE ORAL at 08:07

## 2023-07-02 RX ADMIN — CARVEDILOL 25 MG: 25 TABLET, FILM COATED ORAL at 04:07

## 2023-07-02 RX ADMIN — HEPARIN SODIUM 5000 UNITS: 5000 INJECTION INTRAVENOUS; SUBCUTANEOUS at 09:07

## 2023-07-02 RX ADMIN — CLONIDINE HYDROCHLORIDE 0.3 MG: 0.2 TABLET ORAL at 03:07

## 2023-07-02 RX ADMIN — LACTULOSE 20 G: 20 SOLUTION ORAL at 08:07

## 2023-07-02 RX ADMIN — HEPARIN SODIUM 5000 UNITS: 5000 INJECTION INTRAVENOUS; SUBCUTANEOUS at 06:07

## 2023-07-02 RX ADMIN — BUPROPION HYDROCHLORIDE 150 MG: 150 TABLET, FILM COATED, EXTENDED RELEASE ORAL at 09:07

## 2023-07-02 RX ADMIN — LOSARTAN POTASSIUM 100 MG: 100 TABLET, FILM COATED ORAL at 09:07

## 2023-07-02 RX ADMIN — LACTULOSE 20 G: 20 SOLUTION ORAL at 03:07

## 2023-07-02 RX ADMIN — CARVEDILOL 25 MG: 25 TABLET, FILM COATED ORAL at 08:07

## 2023-07-02 RX ADMIN — INSULIN ASPART 5 UNITS: 100 INJECTION, SOLUTION INTRAVENOUS; SUBCUTANEOUS at 11:07

## 2023-07-02 RX ADMIN — SEVELAMER CARBONATE 800 MG: 800 TABLET, FILM COATED ORAL at 06:07

## 2023-07-02 RX ADMIN — ALPRAZOLAM 0.5 MG: 0.25 TABLET ORAL at 09:07

## 2023-07-02 RX ADMIN — SEVELAMER CARBONATE 800 MG: 800 TABLET, FILM COATED ORAL at 04:07

## 2023-07-02 RX ADMIN — DOCUSATE SODIUM 100 MG: 100 CAPSULE, LIQUID FILLED ORAL at 08:07

## 2023-07-02 RX ADMIN — AMPICILLIN SODIUM 2 G: 2 INJECTION, POWDER, FOR SOLUTION INTRAMUSCULAR; INTRAVENOUS at 04:07

## 2023-07-02 RX ADMIN — FERROUS SULFATE TAB 325 MG (65 MG ELEMENTAL FE) 1 EACH: 325 (65 FE) TAB at 08:07

## 2023-07-02 RX ADMIN — SEVELAMER CARBONATE 800 MG: 800 TABLET, FILM COATED ORAL at 11:07

## 2023-07-02 RX ADMIN — CLONIDINE HYDROCHLORIDE 0.3 MG: 0.2 TABLET ORAL at 08:07

## 2023-07-02 RX ADMIN — AMLODIPINE BESYLATE 10 MG: 10 TABLET ORAL at 08:07

## 2023-07-02 RX ADMIN — LINACLOTIDE 145 MCG: 145 CAPSULE, GELATIN COATED ORAL at 06:07

## 2023-07-02 RX ADMIN — DOCUSATE SODIUM 100 MG: 100 CAPSULE, LIQUID FILLED ORAL at 09:07

## 2023-07-02 RX ADMIN — CLONIDINE HYDROCHLORIDE 0.3 MG: 0.2 TABLET ORAL at 09:07

## 2023-07-02 RX ADMIN — AMPICILLIN SODIUM 2 G: 2 INJECTION, POWDER, FOR SOLUTION INTRAMUSCULAR; INTRAVENOUS at 03:07

## 2023-07-02 RX ADMIN — INSULIN DETEMIR 8 UNITS: 100 INJECTION, SOLUTION SUBCUTANEOUS at 09:07

## 2023-07-02 RX ADMIN — HEPARIN SODIUM 5000 UNITS: 5000 INJECTION INTRAVENOUS; SUBCUTANEOUS at 03:07

## 2023-07-02 NOTE — SUBJECTIVE & OBJECTIVE
Interval History: 37 year old much improved symptoms since he made good bms yesterday like 3. He also relates he doesn't drink coke every day just some sips to wash away lactulose bad taste. No new complaints today.    Review of Systems   Constitutional:  Negative for activity change.   HENT:  Negative for nosebleeds.    Eyes:  Negative for redness.   Cardiovascular:  Negative for chest pain.   Gastrointestinal:  Negative for diarrhea.   Endocrine: Positive for cold intolerance.   Musculoskeletal:  Positive for gait problem.   Allergic/Immunologic: Positive for immunocompromised state.   Psychiatric/Behavioral:  Negative for agitation, behavioral problems, confusion, decreased concentration, dysphoric mood and hallucinations. The patient is not nervous/anxious and is not hyperactive.    Objective:     Vital Signs (Most Recent):  Temp: 98.3 °F (36.8 °C) (07/02/23 0719)  Pulse: 68 (07/02/23 0719)  Resp: 18 (07/02/23 0719)  BP: (!) 164/54 (07/02/23 0719)  SpO2: 96 % (07/02/23 0719) Vital Signs (24h Range):  Temp:  [97.7 °F (36.5 °C)-98.7 °F (37.1 °C)] 98.3 °F (36.8 °C)  Pulse:  [66-97] 68  Resp:  [16-20] 18  SpO2:  [96 %-100 %] 96 %  BP: (156-189)/(52-87) 164/54     Weight: 73.4 kg (161 lb 13.1 oz)  Body mass index is 28.66 kg/m².    Intake/Output Summary (Last 24 hours) at 7/2/2023 0910  Last data filed at 7/1/2023 1851  Gross per 24 hour   Intake 600 ml   Output --   Net 600 ml         Physical Exam  Constitutional:       Appearance: Normal appearance. He is normal weight.   HENT:      Head: Normocephalic and atraumatic.   Eyes:      Extraocular Movements: Extraocular movements intact.      Conjunctiva/sclera: Conjunctivae normal.      Pupils: Pupils are equal, round, and reactive to light.   Cardiovascular:      Rate and Rhythm: Normal rate and regular rhythm.   Pulmonary:      Effort: Pulmonary effort is normal.      Breath sounds: Normal breath sounds.   Abdominal:      General: Abdomen is flat.   Skin:      General: Skin is warm and dry.      Capillary Refill: Capillary refill takes less than 2 seconds.   Neurological:      General: No focal deficit present.      Mental Status: He is alert and oriented to person, place, and time. Mental status is at baseline.   Psychiatric:         Mood and Affect: Mood normal.         Behavior: Behavior normal.         Thought Content: Thought content normal.         Judgment: Judgment normal.           Significant Labs: All pertinent labs within the past 24 hours have been reviewed.    Significant Imaging: I have reviewed all pertinent imaging results/findings within the past 24 hours.

## 2023-07-02 NOTE — PROGRESS NOTES
Patient denies shortness of breath.  Review of systems GI denies nausea or vomiting     Physical exam general patient in acute distress    Assessment/plan 1.  ESRD-continue HD support   2.  Anemia-patient's hematocrit was recently 32%, continue to monitor   3. Hypertension-continue present antihypertensives   4. Osteomyelitis-continue antibiotics  Five.  Diabetes mellitus-continue present hypoglycemic regimen,  6. Secondary hyperparathyroidism-continue Renvela with meals

## 2023-07-02 NOTE — ASSESSMENT & PLAN NOTE
1st and 2nd toe Left foot (acute) Osteomyelitis. Complicated by moderate vascular disease of Lower extremities.  CTA Bilateral lower extremities reveals moderate to severe calcified vascular disease affects the arteries of the bilateral lower extremities.    -medical management with Vancomycin and cefepime started on 5/25/23  -wound care consulted  5/29: may require biopsy and debridement by general surgery.  Per their last note, follow-up will be this week.    5/30: Wound culture with Proteus and Enterococcus both sensitive to ampicillin, with adjust therapy    Antibiotics (From admission, onward)    Start     Stop Route Frequency Ordered    06/10/23 1700  ampicillin (OMNIPEN) 2 g in sodium chloride 0.9 % 100 mL IVPB (MB+)         07/07 1559 IV Every 12 hours 06/10/23 1650    06/09/23 1526  silver sulfADIAZINE 1% cream         -- Top Daily PRN 06/09/23 1431    05/29/23 1130  mupirocin 2 % ointment         06/03 0859 Nasl 2 times daily 05/29/23 1028        6/19 - Course of abx continues.     6/20 - Wound care notes reviewed, continue abx and wound care.     6/24 - Tolerating abx without side effects. Completes 7/6/23 6/29 no problems with itching or antibiotics iv antibiotics 7/6/2023 7/2 continue IV antibiotics

## 2023-07-02 NOTE — ASSESSMENT & PLAN NOTE
Improved with Imdur, still some hypertension. Will adjust dose clonidine  and monitor.not at goal this morning second dose clonidine planned shortly. Continue clonidine.

## 2023-07-02 NOTE — ASSESSMENT & PLAN NOTE
06/05 - Surgery to see patient about foot  Has peripheral arterial disease  Arteriogram done by vascular surgery   PICC line due to poor iv access.  Adjusted his insulins up to get better control of his sugars   One episode of hypoglycemia he promised not to skip meals.adjusted his levemir from 12 to 10.   7/1 On iv antibiotics for infection  7/2 On iv antibiotics no change, backed down on his nighttime had another trinity this am from 10 to 8
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
## 2023-07-03 LAB
GLUCOSE SERPL-MCNC: 137 MG/DL (ref 70–105)
GLUCOSE SERPL-MCNC: 174 MG/DL (ref 70–105)
GLUCOSE SERPL-MCNC: 232 MG/DL (ref 70–105)
GLUCOSE SERPL-MCNC: 59 MG/DL (ref 70–105)
GLUCOSE SERPL-MCNC: 62 MG/DL (ref 70–105)

## 2023-07-03 PROCEDURE — 82962 GLUCOSE BLOOD TEST: CPT

## 2023-07-03 PROCEDURE — 11000001 HC ACUTE MED/SURG PRIVATE ROOM

## 2023-07-03 PROCEDURE — 25000003 PHARM REV CODE 250: Performed by: INTERNAL MEDICINE

## 2023-07-03 PROCEDURE — 90935 HEMODIALYSIS ONE EVALUATION: CPT

## 2023-07-03 PROCEDURE — 25000003 PHARM REV CODE 250: Performed by: NURSE PRACTITIONER

## 2023-07-03 PROCEDURE — 63600175 PHARM REV CODE 636 W HCPCS: Performed by: HOSPITALIST

## 2023-07-03 PROCEDURE — 63600175 PHARM REV CODE 636 W HCPCS: Performed by: FAMILY MEDICINE

## 2023-07-03 PROCEDURE — 63600175 PHARM REV CODE 636 W HCPCS: Performed by: NURSE PRACTITIONER

## 2023-07-03 PROCEDURE — 25000003 PHARM REV CODE 250: Performed by: HOSPITALIST

## 2023-07-03 PROCEDURE — 25000003 PHARM REV CODE 250: Performed by: FAMILY MEDICINE

## 2023-07-03 PROCEDURE — 99232 SBSQ HOSP IP/OBS MODERATE 35: CPT | Mod: ,,, | Performed by: FAMILY MEDICINE

## 2023-07-03 PROCEDURE — 99232 PR SUBSEQUENT HOSPITAL CARE,LEVL II: ICD-10-PCS | Mod: ,,, | Performed by: FAMILY MEDICINE

## 2023-07-03 RX ORDER — ISOSORBIDE MONONITRATE 30 MG/1
120 TABLET, EXTENDED RELEASE ORAL DAILY
Status: DISCONTINUED | OUTPATIENT
Start: 2023-07-04 | End: 2023-07-07 | Stop reason: HOSPADM

## 2023-07-03 RX ORDER — SODIUM CHLORIDE 9 MG/ML
INJECTION, SOLUTION INTRAVENOUS
Status: DISCONTINUED | OUTPATIENT
Start: 2023-07-03 | End: 2023-07-07 | Stop reason: HOSPADM

## 2023-07-03 RX ORDER — HALOPERIDOL 1 MG/1
2 TABLET ORAL 2 TIMES DAILY
Status: DISCONTINUED | OUTPATIENT
Start: 2023-07-03 | End: 2023-07-04

## 2023-07-03 RX ORDER — HALOPERIDOL 5 MG/ML
1 INJECTION INTRAMUSCULAR ONCE
Status: COMPLETED | OUTPATIENT
Start: 2023-07-03 | End: 2023-07-03

## 2023-07-03 RX ADMIN — INSULIN ASPART 5 UNITS: 100 INJECTION, SOLUTION INTRAVENOUS; SUBCUTANEOUS at 04:07

## 2023-07-03 RX ADMIN — HEPARIN SODIUM 5000 UNITS: 5000 INJECTION INTRAVENOUS; SUBCUTANEOUS at 10:07

## 2023-07-03 RX ADMIN — SODIUM CHLORIDE: 9 INJECTION, SOLUTION INTRAVENOUS at 08:07

## 2023-07-03 RX ADMIN — LINACLOTIDE 145 MCG: 145 CAPSULE, GELATIN COATED ORAL at 05:07

## 2023-07-03 RX ADMIN — INSULIN ASPART 2 UNITS: 100 INJECTION, SOLUTION INTRAVENOUS; SUBCUTANEOUS at 04:07

## 2023-07-03 RX ADMIN — LOSARTAN POTASSIUM 100 MG: 100 TABLET, FILM COATED ORAL at 09:07

## 2023-07-03 RX ADMIN — SEVELAMER CARBONATE 800 MG: 800 TABLET, FILM COATED ORAL at 06:07

## 2023-07-03 RX ADMIN — CLONIDINE HYDROCHLORIDE 0.3 MG: 0.2 TABLET ORAL at 11:07

## 2023-07-03 RX ADMIN — HEPARIN SODIUM 5000 UNITS: 5000 INJECTION INTRAVENOUS; SUBCUTANEOUS at 02:07

## 2023-07-03 RX ADMIN — HALOPERIDOL LACTATE 1 MG: 5 INJECTION, SOLUTION INTRAMUSCULAR at 02:07

## 2023-07-03 RX ADMIN — Medication 16 G: at 12:07

## 2023-07-03 RX ADMIN — CARVEDILOL 25 MG: 25 TABLET, FILM COATED ORAL at 04:07

## 2023-07-03 RX ADMIN — FERROUS SULFATE TAB 325 MG (65 MG ELEMENTAL FE) 1 EACH: 325 (65 FE) TAB at 11:07

## 2023-07-03 RX ADMIN — BUPROPION HYDROCHLORIDE 150 MG: 150 TABLET, FILM COATED, EXTENDED RELEASE ORAL at 11:07

## 2023-07-03 RX ADMIN — HEPARIN SODIUM 5000 UNITS: 5000 INJECTION INTRAVENOUS; SUBCUTANEOUS at 05:07

## 2023-07-03 RX ADMIN — SEVELAMER CARBONATE 800 MG: 800 TABLET, FILM COATED ORAL at 04:07

## 2023-07-03 RX ADMIN — DOCUSATE SODIUM 100 MG: 100 CAPSULE, LIQUID FILLED ORAL at 09:07

## 2023-07-03 RX ADMIN — AMPICILLIN SODIUM 2 G: 2 INJECTION, POWDER, FOR SOLUTION INTRAMUSCULAR; INTRAVENOUS at 04:07

## 2023-07-03 RX ADMIN — PANTOPRAZOLE SODIUM 40 MG: 40 TABLET, DELAYED RELEASE ORAL at 11:07

## 2023-07-03 RX ADMIN — BUPROPION HYDROCHLORIDE 150 MG: 150 TABLET, FILM COATED, EXTENDED RELEASE ORAL at 09:07

## 2023-07-03 RX ADMIN — CLONIDINE HYDROCHLORIDE 0.3 MG: 0.2 TABLET ORAL at 04:07

## 2023-07-03 RX ADMIN — CLONIDINE HYDROCHLORIDE 0.3 MG: 0.2 TABLET ORAL at 09:07

## 2023-07-03 RX ADMIN — INSULIN DETEMIR 6 UNITS: 100 INJECTION, SOLUTION SUBCUTANEOUS at 09:07

## 2023-07-03 RX ADMIN — HALOPERIDOL 2 MG: 1 TABLET ORAL at 09:07

## 2023-07-03 RX ADMIN — INSULIN ASPART 5 UNITS: 100 INJECTION, SOLUTION INTRAVENOUS; SUBCUTANEOUS at 06:07

## 2023-07-03 RX ADMIN — DOCUSATE SODIUM 100 MG: 100 CAPSULE, LIQUID FILLED ORAL at 11:07

## 2023-07-03 RX ADMIN — CARVEDILOL 25 MG: 25 TABLET, FILM COATED ORAL at 11:07

## 2023-07-03 RX ADMIN — SEVELAMER CARBONATE 800 MG: 800 TABLET, FILM COATED ORAL at 12:07

## 2023-07-03 RX ADMIN — AMLODIPINE BESYLATE 10 MG: 10 TABLET ORAL at 11:07
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
## 2023-07-03 NOTE — PROGRESS NOTES
Patient denies shortness of breath.  Review of systems GI denies nausea or vomiting     Physical exam general patient in acute distress    Assessment/plan 1.  ESRD-continue HD support   2.  Anemia-patient's hematocrit was recently 32%, continue to monitor   3. Hypertension-continue present antihypertensives , BP is 187/42 around lunchtime  4. Osteomyelitis-continue antibiotics  Five.  Diabetes mellitus-continue present hypoglycemic regimen,  6. Secondary hyperparathyroidism-continue Renvela with meals

## 2023-07-03 NOTE — ASSESSMENT & PLAN NOTE
Had another  movements yesterday between linzess suppository and the enema,encouraged him to eat more oatmeal if that's what helps at home

## 2023-07-03 NOTE — ASSESSMENT & PLAN NOTE
Improved with Imdur,  Clonidine,carvedilol, amlodipine   and monitor.not at goal this morning increased imdur today.
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
## 2023-07-03 NOTE — ASSESSMENT & PLAN NOTE
06/05 - Surgery to see patient about foot  Has peripheral arterial disease  Arteriogram done by vascular surgery   PICC line due to poor iv access.  Adjusted his insulins up to get better control of his sugars   One episode of hypoglycemia he promised not to skip meals.adjusted his levemir from 12 to 10.   7/1 On iv antibiotics for infection  7/2 On iv antibiotics no change, backed down on his nighttime had another trinity this am from 10 to 8   7/2 antibiotics wrap up this week

## 2023-07-03 NOTE — SUBJECTIVE & OBJECTIVE
Interval History: 37 year old male here for iv antibiotics through end of this week for osteomylitis.complains of seeing hallucinations last night three times then didn't sleep well due to being afraid to fall asleep .agreeable to short course of meds for hospital delirium.he thinks changing rooms is what did it.    Review of Systems   Constitutional:  Negative for activity change.   HENT:  Negative for sneezing.    Eyes:  Negative for redness.   Respiratory:  Negative for cough.    Cardiovascular:  Negative for chest pain.   Gastrointestinal:  Negative for abdominal distention.   Endocrine: Negative for polyuria.   Genitourinary:  Positive for difficulty urinating.   Musculoskeletal:  Positive for gait problem.   Neurological:  Negative for speech difficulty.   Psychiatric/Behavioral:  Positive for hallucinations.         TWO EPISODES LAST PM WITH SEEING SHADOWS CARTS MOVING THEMSELVES AND STRANGERS LINED UP IN HIS ROOM   Objective:     Vital Signs (Most Recent):  Temp: 98.4 °F (36.9 °C) (07/03/23 1123)  Pulse: 70 (07/03/23 1123)  Resp: 18 (07/03/23 1123)  BP: (!) 187/42 (07/03/23 1123)  SpO2: 100 % (07/03/23 0800) Vital Signs (24h Range):  Temp:  [97.4 °F (36.3 °C)-98.8 °F (37.1 °C)] 98.4 °F (36.9 °C)  Pulse:  [62-70] 70  Resp:  [16-19] 18  SpO2:  [97 %-100 %] 100 %  BP: (139-187)/(35-72) 187/42     Weight: 73.8 kg (162 lb 11.2 oz)  Body mass index is 28.82 kg/m².    Intake/Output Summary (Last 24 hours) at 7/3/2023 1207  Last data filed at 7/3/2023 1123  Gross per 24 hour   Intake 560 ml   Output 2800 ml   Net -2240 ml         Physical Exam  Constitutional:       Appearance: Normal appearance. He is normal weight.   HENT:      Head: Normocephalic and atraumatic.   Eyes:      Extraocular Movements: Extraocular movements intact.      Conjunctiva/sclera: Conjunctivae normal.      Pupils: Pupils are equal, round, and reactive to light.   Cardiovascular:      Rate and Rhythm: Normal rate and regular rhythm.    Pulmonary:      Effort: No respiratory distress.   Abdominal:      General: There is no distension.   Musculoskeletal:         General: No swelling.      Cervical back: Normal range of motion and neck supple.   Skin:     General: Skin is warm and dry.      Capillary Refill: Capillary refill takes less than 2 seconds.   Neurological:      Mental Status: He is alert and oriented to person, place, and time. Mental status is at baseline.   Psychiatric:         Mood and Affect: Mood normal.         Behavior: Behavior normal.         Thought Content: Thought content normal.         Judgment: Judgment normal.      Comments: ADMITS to visual hallucinations           Significant Labs: All pertinent labs within the past 24 hours have been reviewed.    Significant Imaging: I have reviewed all pertinent imaging results/findings within the past 24 hours.

## 2023-07-03 NOTE — ASSESSMENT & PLAN NOTE
T 5 paraplegic.  Regaining some function. Recommend PMR referral placed for d/c.   Discussed [facility] at discharge.He will think about it.

## 2023-07-04 LAB
GLUCOSE SERPL-MCNC: 152 MG/DL (ref 70–105)
GLUCOSE SERPL-MCNC: 152 MG/DL (ref 70–105)
GLUCOSE SERPL-MCNC: 219 MG/DL (ref 70–105)
GLUCOSE SERPL-MCNC: 274 MG/DL (ref 70–105)

## 2023-07-04 PROCEDURE — 25000003 PHARM REV CODE 250

## 2023-07-04 PROCEDURE — 63600175 PHARM REV CODE 636 W HCPCS: Performed by: NURSE PRACTITIONER

## 2023-07-04 PROCEDURE — 63600175 PHARM REV CODE 636 W HCPCS: Performed by: FAMILY MEDICINE

## 2023-07-04 PROCEDURE — 63600175 PHARM REV CODE 636 W HCPCS: Performed by: HOSPITALIST

## 2023-07-04 PROCEDURE — 25000003 PHARM REV CODE 250: Performed by: HOSPITALIST

## 2023-07-04 PROCEDURE — 25000003 PHARM REV CODE 250: Performed by: FAMILY MEDICINE

## 2023-07-04 PROCEDURE — 25000003 PHARM REV CODE 250: Performed by: NURSE PRACTITIONER

## 2023-07-04 PROCEDURE — 11000001 HC ACUTE MED/SURG PRIVATE ROOM

## 2023-07-04 PROCEDURE — 82962 GLUCOSE BLOOD TEST: CPT

## 2023-07-04 RX ORDER — HALOPERIDOL 1 MG/1
2 TABLET ORAL 3 TIMES DAILY
Status: DISCONTINUED | OUTPATIENT
Start: 2023-07-04 | End: 2023-07-07 | Stop reason: HOSPADM

## 2023-07-04 RX ADMIN — SEVELAMER CARBONATE 800 MG: 800 TABLET, FILM COATED ORAL at 06:07

## 2023-07-04 RX ADMIN — BUPROPION HYDROCHLORIDE 150 MG: 150 TABLET, FILM COATED, EXTENDED RELEASE ORAL at 09:07

## 2023-07-04 RX ADMIN — HALOPERIDOL 2 MG: 1 TABLET ORAL at 09:07

## 2023-07-04 RX ADMIN — CARVEDILOL 25 MG: 25 TABLET, FILM COATED ORAL at 08:07

## 2023-07-04 RX ADMIN — CLONIDINE HYDROCHLORIDE 0.3 MG: 0.2 TABLET ORAL at 08:07

## 2023-07-04 RX ADMIN — SEVELAMER CARBONATE 800 MG: 800 TABLET, FILM COATED ORAL at 11:07

## 2023-07-04 RX ADMIN — HEPARIN SODIUM 5000 UNITS: 5000 INJECTION INTRAVENOUS; SUBCUTANEOUS at 10:07

## 2023-07-04 RX ADMIN — INSULIN ASPART 5 UNITS: 100 INJECTION, SOLUTION INTRAVENOUS; SUBCUTANEOUS at 04:07

## 2023-07-04 RX ADMIN — INSULIN ASPART 5 UNITS: 100 INJECTION, SOLUTION INTRAVENOUS; SUBCUTANEOUS at 06:07

## 2023-07-04 RX ADMIN — CLONIDINE HYDROCHLORIDE 0.3 MG: 0.2 TABLET ORAL at 09:07

## 2023-07-04 RX ADMIN — CARVEDILOL 25 MG: 25 TABLET, FILM COATED ORAL at 04:07

## 2023-07-04 RX ADMIN — HEPARIN SODIUM 5000 UNITS: 5000 INJECTION INTRAVENOUS; SUBCUTANEOUS at 06:07

## 2023-07-04 RX ADMIN — INSULIN DETEMIR 6 UNITS: 100 INJECTION, SOLUTION SUBCUTANEOUS at 09:07

## 2023-07-04 RX ADMIN — SEVELAMER CARBONATE 800 MG: 800 TABLET, FILM COATED ORAL at 04:07

## 2023-07-04 RX ADMIN — HEPARIN SODIUM 5000 UNITS: 5000 INJECTION INTRAVENOUS; SUBCUTANEOUS at 02:07

## 2023-07-04 RX ADMIN — HYDRALAZINE HYDROCHLORIDE 25 MG: 25 TABLET ORAL at 04:07

## 2023-07-04 RX ADMIN — ISOSORBIDE MONONITRATE 120 MG: 30 TABLET, EXTENDED RELEASE ORAL at 08:07

## 2023-07-04 RX ADMIN — AMPICILLIN SODIUM 2 G: 2 INJECTION, POWDER, FOR SOLUTION INTRAMUSCULAR; INTRAVENOUS at 04:07

## 2023-07-04 RX ADMIN — PANTOPRAZOLE SODIUM 40 MG: 40 TABLET, DELAYED RELEASE ORAL at 08:07

## 2023-07-04 RX ADMIN — HALOPERIDOL 2 MG: 1 TABLET ORAL at 06:07

## 2023-07-04 RX ADMIN — AMLODIPINE BESYLATE 10 MG: 10 TABLET ORAL at 08:07

## 2023-07-04 RX ADMIN — FERROUS SULFATE TAB 325 MG (65 MG ELEMENTAL FE) 1 EACH: 325 (65 FE) TAB at 08:07

## 2023-07-04 RX ADMIN — BUPROPION HYDROCHLORIDE 150 MG: 150 TABLET, FILM COATED, EXTENDED RELEASE ORAL at 08:07

## 2023-07-04 RX ADMIN — LINACLOTIDE 145 MCG: 145 CAPSULE, GELATIN COATED ORAL at 06:07

## 2023-07-04 RX ADMIN — HALOPERIDOL 2 MG: 1 TABLET ORAL at 08:07

## 2023-07-04 RX ADMIN — CLONIDINE HYDROCHLORIDE 0.3 MG: 0.2 TABLET ORAL at 02:07

## 2023-07-04 RX ADMIN — DOCUSATE SODIUM 100 MG: 100 CAPSULE, LIQUID FILLED ORAL at 08:07

## 2023-07-04 RX ADMIN — INSULIN ASPART 3 UNITS: 100 INJECTION, SOLUTION INTRAVENOUS; SUBCUTANEOUS at 04:07

## 2023-07-04 RX ADMIN — DOCUSATE SODIUM 100 MG: 100 CAPSULE, LIQUID FILLED ORAL at 09:07

## 2023-07-04 RX ADMIN — LOSARTAN POTASSIUM 100 MG: 100 TABLET, FILM COATED ORAL at 09:07

## 2023-07-04 NOTE — SUBJECTIVE & OBJECTIVE
Interval History: No hallucinations today. A bit anxious about having had some     Review of Systems  Objective:     Vital Signs (Most Recent):  Temp: 98.6 °F (37 °C) (07/04/23 0800)  Pulse: 73 (07/04/23 0800)  Resp: 18 (07/04/23 0800)  BP: (!) 199/63 (07/04/23 0800)  SpO2: 100 % (07/04/23 0800) Vital Signs (24h Range):  Temp:  [98.1 °F (36.7 °C)-98.6 °F (37 °C)] 98.6 °F (37 °C)  Pulse:  [73-83] 73  Resp:  [18-19] 18  SpO2:  [97 %-100 %] 100 %  BP: (165-199)/(40-63) 199/63     Weight: 70.2 kg (154 lb 12.2 oz)  Body mass index is 27.42 kg/m².    Intake/Output Summary (Last 24 hours) at 7/4/2023 1301  Last data filed at 7/4/2023 0838  Gross per 24 hour   Intake 240 ml   Output --   Net 240 ml         Physical Exam  Constitutional:       Appearance: Normal appearance. He is normal weight.   HENT:      Head: Normocephalic and atraumatic.   Eyes:      Extraocular Movements: Extraocular movements intact.      Conjunctiva/sclera: Conjunctivae normal.      Pupils: Pupils are equal, round, and reactive to light.   Cardiovascular:      Rate and Rhythm: Normal rate and regular rhythm.   Pulmonary:      Effort: Pulmonary effort is normal.      Breath sounds: Normal breath sounds.   Abdominal:      General: There is no distension.   Musculoskeletal:         General: No swelling.      Cervical back: Normal range of motion and neck supple.   Skin:     General: Skin is warm and dry.      Capillary Refill: Capillary refill takes less than 2 seconds.   Neurological:      Mental Status: He is alert and oriented to person, place, and time. Mental status is at baseline.   Psychiatric:         Mood and Affect: Mood normal.         Behavior: Behavior normal.         Thought Content: Thought content normal.         Judgment: Judgment normal.           Significant Labs: All pertinent labs within the past 24 hours have been reviewed.    Significant Imaging: I have reviewed all pertinent imaging results/findings within the past 24 hours.

## 2023-07-05 LAB
GLUCOSE SERPL-MCNC: 138 MG/DL (ref 70–105)
GLUCOSE SERPL-MCNC: 146 MG/DL (ref 70–105)
GLUCOSE SERPL-MCNC: 167 MG/DL (ref 70–105)
GLUCOSE SERPL-MCNC: 226 MG/DL (ref 70–105)
GLUCOSE SERPL-MCNC: 79 MG/DL (ref 70–105)

## 2023-07-05 PROCEDURE — 25000003 PHARM REV CODE 250: Performed by: FAMILY MEDICINE

## 2023-07-05 PROCEDURE — 25000003 PHARM REV CODE 250

## 2023-07-05 PROCEDURE — 11000001 HC ACUTE MED/SURG PRIVATE ROOM

## 2023-07-05 PROCEDURE — 90935 HEMODIALYSIS ONE EVALUATION: CPT

## 2023-07-05 PROCEDURE — 63600175 PHARM REV CODE 636 W HCPCS: Performed by: HOSPITALIST

## 2023-07-05 PROCEDURE — 80100014 HC HEMODIALYSIS 1:1

## 2023-07-05 PROCEDURE — 82962 GLUCOSE BLOOD TEST: CPT

## 2023-07-05 PROCEDURE — 25000003 PHARM REV CODE 250: Performed by: HOSPITALIST

## 2023-07-05 PROCEDURE — 96372 THER/PROPH/DIAG INJ SC/IM: CPT

## 2023-07-05 PROCEDURE — 25000003 PHARM REV CODE 250: Performed by: NURSE PRACTITIONER

## 2023-07-05 PROCEDURE — 99232 SBSQ HOSP IP/OBS MODERATE 35: CPT | Mod: ,,, | Performed by: NURSE PRACTITIONER

## 2023-07-05 PROCEDURE — 63600175 PHARM REV CODE 636 W HCPCS: Performed by: FAMILY MEDICINE

## 2023-07-05 PROCEDURE — 25000003 PHARM REV CODE 250: Performed by: INTERNAL MEDICINE

## 2023-07-05 PROCEDURE — 99232 PR SUBSEQUENT HOSPITAL CARE,LEVL II: ICD-10-PCS | Mod: ,,, | Performed by: NURSE PRACTITIONER

## 2023-07-05 PROCEDURE — 63600175 PHARM REV CODE 636 W HCPCS: Performed by: NURSE PRACTITIONER

## 2023-07-05 RX ADMIN — LOSARTAN POTASSIUM 100 MG: 100 TABLET, FILM COATED ORAL at 10:07

## 2023-07-05 RX ADMIN — SEVELAMER CARBONATE 800 MG: 800 TABLET, FILM COATED ORAL at 08:07

## 2023-07-05 RX ADMIN — LINACLOTIDE 145 MCG: 145 CAPSULE, GELATIN COATED ORAL at 05:07

## 2023-07-05 RX ADMIN — SEVELAMER CARBONATE 800 MG: 800 TABLET, FILM COATED ORAL at 11:07

## 2023-07-05 RX ADMIN — HALOPERIDOL 2 MG: 1 TABLET ORAL at 03:07

## 2023-07-05 RX ADMIN — CLONIDINE HYDROCHLORIDE 0.3 MG: 0.2 TABLET ORAL at 03:07

## 2023-07-05 RX ADMIN — AMPICILLIN SODIUM 2 G: 2 INJECTION, POWDER, FOR SOLUTION INTRAMUSCULAR; INTRAVENOUS at 04:07

## 2023-07-05 RX ADMIN — HALOPERIDOL 2 MG: 1 TABLET ORAL at 10:07

## 2023-07-05 RX ADMIN — DOCUSATE SODIUM 100 MG: 100 CAPSULE, LIQUID FILLED ORAL at 08:07

## 2023-07-05 RX ADMIN — HEPARIN SODIUM 5000 UNITS: 5000 INJECTION INTRAVENOUS; SUBCUTANEOUS at 05:07

## 2023-07-05 RX ADMIN — INSULIN DETEMIR 6 UNITS: 100 INJECTION, SOLUTION SUBCUTANEOUS at 08:07

## 2023-07-05 RX ADMIN — INSULIN ASPART 5 UNITS: 100 INJECTION, SOLUTION INTRAVENOUS; SUBCUTANEOUS at 08:07

## 2023-07-05 RX ADMIN — AMPICILLIN SODIUM 2 G: 2 INJECTION, POWDER, FOR SOLUTION INTRAMUSCULAR; INTRAVENOUS at 06:07

## 2023-07-05 RX ADMIN — LACTULOSE 20 G: 20 SOLUTION ORAL at 10:07

## 2023-07-05 RX ADMIN — AMLODIPINE BESYLATE 10 MG: 10 TABLET ORAL at 08:07

## 2023-07-05 RX ADMIN — PANTOPRAZOLE SODIUM 40 MG: 40 TABLET, DELAYED RELEASE ORAL at 08:07

## 2023-07-05 RX ADMIN — HEPARIN SODIUM 5000 UNITS: 5000 INJECTION INTRAVENOUS; SUBCUTANEOUS at 10:07

## 2023-07-05 RX ADMIN — FERROUS SULFATE TAB 325 MG (65 MG ELEMENTAL FE) 1 EACH: 325 (65 FE) TAB at 08:07

## 2023-07-05 RX ADMIN — CLONIDINE HYDROCHLORIDE 0.3 MG: 0.2 TABLET ORAL at 10:07

## 2023-07-05 RX ADMIN — SODIUM CHLORIDE: 9 INJECTION, SOLUTION INTRAVENOUS at 02:07

## 2023-07-05 RX ADMIN — CARVEDILOL 25 MG: 25 TABLET, FILM COATED ORAL at 08:07

## 2023-07-05 RX ADMIN — ISOSORBIDE MONONITRATE 120 MG: 30 TABLET, EXTENDED RELEASE ORAL at 08:07

## 2023-07-05 RX ADMIN — CARVEDILOL 25 MG: 25 TABLET, FILM COATED ORAL at 06:07

## 2023-07-05 RX ADMIN — BUPROPION HYDROCHLORIDE 150 MG: 150 TABLET, FILM COATED, EXTENDED RELEASE ORAL at 10:07

## 2023-07-05 RX ADMIN — HEPARIN SODIUM 5000 UNITS: 5000 INJECTION INTRAVENOUS; SUBCUTANEOUS at 03:07

## 2023-07-05 RX ADMIN — BUPROPION HYDROCHLORIDE 150 MG: 150 TABLET, FILM COATED, EXTENDED RELEASE ORAL at 08:07

## 2023-07-05 RX ADMIN — HALOPERIDOL 2 MG: 1 TABLET ORAL at 08:07

## 2023-07-05 RX ADMIN — CLONIDINE HYDROCHLORIDE 0.3 MG: 0.2 TABLET ORAL at 08:07

## 2023-07-05 RX ADMIN — SEVELAMER CARBONATE 800 MG: 800 TABLET, FILM COATED ORAL at 06:07

## 2023-07-05 RX ADMIN — DOCUSATE SODIUM 100 MG: 100 CAPSULE, LIQUID FILLED ORAL at 10:07
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
## 2023-07-05 NOTE — ASSESSMENT & PLAN NOTE
Clean with vashe   Apply drawtex  Cover and secure with abd pad and paper tape  Change every daily  Turn every 2 hours  Tap system - utilize wedges for repositioning  Low air loss mattress
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
## 2023-07-05 NOTE — PROGRESS NOTES
[facility]  Nephrology  Progress Note    Patient Name: Lee Escobar  MRN: [id number]  Admission Date: 5/26/2023  Hospital Length of Stay: 39 days  Attending Provider: Macho Fox Jr., MD   Primary Care Physician: Maria Elena Solorio II, MD  Principal Problem:Osteomyelitis of left foot    Consults  Subjective:     Interval History: The patient has no complaints today  Review of patient's allergies indicates:  No Known Allergies  Current Facility-Administered Medications   Medication Frequency    0.9%  NaCl infusion PRN    acetaminophen tablet 650 mg Q4H PRN    ALPRAZolam tablet 0.5 mg TID PRN    amLODIPine tablet 10 mg Daily    ampicillin (OMNIPEN) 2 g in sodium chloride 0.9 % 100 mL IVPB (MB+) Q12H    bisacodyL suppository 10 mg Daily    buPROPion TBSR 12 hr tablet 150 mg BID    carvediloL tablet 25 mg BID WM    cloNIDine tablet 0.3 mg TID    dextrose 50% injection 12.5 g PRN    dextrose 50% injection 25 g PRN    docusate sodium capsule 100 mg BID    ferrous sulfate tablet 1 each Daily    glucagon (human recombinant) injection 1 mg PRN    glucose chewable tablet 16 g PRN    glucose chewable tablet 24 g PRN    haloperidoL tablet 2 mg TID    heparin (porcine) injection 5,000 Units Q8H    hydrALAZINE tablet 25 mg Q8H PRN    insulin aspart U-100 injection 0-5 Units QID (AC + HS) PRN    insulin aspart U-100 injection 5 Units TIDWM    insulin detemir U-100 injection 6 Units QHS    isosorbide mononitrate 24 hr tablet 120 mg Daily    labetaloL injection 10 mg Q4H PRN    lactulose 20 gram/30 mL solution Soln 20 g TID    linaCLOtide capsule 145 mcg Daily PRN    linaCLOtide capsule 145 mcg Before breakfast    losartan tablet 100 mg QHS    melatonin tablet 6 mg Nightly PRN    naloxone 0.4 mg/mL injection 0.02 mg PRN    ondansetron injection 4 mg Q8H PRN    pantoprazole EC tablet 40 mg Daily    sevelamer carbonate tablet 800 mg TIDWM    silver sulfADIAZINE 1% cream Daily PRN    sodium chloride 0.9%  flush 10 mL Q12H PRN       Objective:     Vital Signs (Most Recent):  Temp: 97.7 °F (36.5 °C) (07/04/23 1901)  Pulse: 66 (07/04/23 1901)  Resp: 18 (07/04/23 1901)  BP: (!) 148/49 (07/04/23 1901)  SpO2: 98 % (07/04/23 1901) Vital Signs (24h Range):  Temp:  [97.7 °F (36.5 °C)-98.6 °F (37 °C)] 97.7 °F (36.5 °C)  Pulse:  [] 66  Resp:  [18-19] 18  SpO2:  [97 %-100 %] 98 %  BP: (112-199)/(40-70) 148/49     Weight: 70.2 kg (154 lb 12.2 oz) (07/04/23 0635)  Body mass index is 27.42 kg/m².  Body surface area is 1.77 meters squared.    I/O last 3 completed shifts:  In: 580 [P.O.:480; IV Piggyback:100]  Out: 2500 [Other:2500]    Physical Exam  Lungs-clear  Cor-2/6 systolic murmur no rub  Abd-soft      Significant Labs:sureCBC:   Recent Labs   Lab 06/28/23  1629   WBC 7.33   RBC 2.76*   HGB 7.6*   HCT 24.0*      MCV 87.0   MCH 27.5   MCHC 31.7*     CMP:   Recent Labs   Lab 06/28/23  1629   GLU 42*   CALCIUM 8.5      K 4.0   CO2 28      BUN 67*   CREATININE 4.90*     All labs within the past 24 hours have been reviewed.    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]  Not Applicable     Chronic    Diabetic ulcer of toe of left foot associated with type 2 diabetes mellitus, with bone involvement without evidence of necrosis [E11.621, L97.526] 05/31/2023 Yes    Pressure injury of left heel, unstageable [L89.620] 05/31/2023 Yes    Pressure injury of right buttock, stage 2 [L89.312] 05/31/2023 Yes    HTN (hypertension) [I10] 05/25/2023 Yes     Chronic    Constipation [K59.00] 05/25/2023 Unknown     Chronic    ESRD (end stage renal disease) [N18.6] 05/25/2023 Yes    Wound infection [T14.8XXA, L08.9] 05/25/2023 Yes    Major depression [F32.9] 05/25/2023 Yes      Problems Resolved During this Admission:    Diagnosis Date Noted Date Resolved  POA    Right flank pain [R10.9] 06/16/2023 06/18/2023 Yes    Type 2 diabetes mellitus with kidney complication, with long-term current use of insulin [E11.29, Z79.4] 05/25/2023 06/18/2023 Not Applicable     Chronic       Plan:  Hemodialysis tomorrow    Thank you for your consult. I will follow-up with patient. Please contact us if you have any additional questions.    Danny Peter MD  Nephrology  [facility]
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
## 2023-07-06 LAB
GLUCOSE SERPL-MCNC: 165 MG/DL (ref 70–105)
GLUCOSE SERPL-MCNC: 193 MG/DL (ref 70–105)
GLUCOSE SERPL-MCNC: 202 MG/DL (ref 70–105)
GLUCOSE SERPL-MCNC: 83 MG/DL (ref 70–105)

## 2023-07-06 PROCEDURE — 25000003 PHARM REV CODE 250: Performed by: HOSPITALIST

## 2023-07-06 PROCEDURE — 82962 GLUCOSE BLOOD TEST: CPT

## 2023-07-06 PROCEDURE — 25000003 PHARM REV CODE 250: Performed by: NURSE PRACTITIONER

## 2023-07-06 PROCEDURE — 25000003 PHARM REV CODE 250: Performed by: FAMILY MEDICINE

## 2023-07-06 PROCEDURE — 63600175 PHARM REV CODE 636 W HCPCS: Performed by: FAMILY MEDICINE

## 2023-07-06 PROCEDURE — 25000003 PHARM REV CODE 250

## 2023-07-06 PROCEDURE — 63600175 PHARM REV CODE 636 W HCPCS: Performed by: HOSPITALIST

## 2023-07-06 PROCEDURE — 96372 THER/PROPH/DIAG INJ SC/IM: CPT

## 2023-07-06 PROCEDURE — 63600175 PHARM REV CODE 636 W HCPCS: Performed by: NURSE PRACTITIONER

## 2023-07-06 PROCEDURE — 11000001 HC ACUTE MED/SURG PRIVATE ROOM

## 2023-07-06 RX ADMIN — INSULIN ASPART 5 UNITS: 100 INJECTION, SOLUTION INTRAVENOUS; SUBCUTANEOUS at 06:07

## 2023-07-06 RX ADMIN — HALOPERIDOL 2 MG: 1 TABLET ORAL at 02:07

## 2023-07-06 RX ADMIN — ISOSORBIDE MONONITRATE 120 MG: 30 TABLET, EXTENDED RELEASE ORAL at 08:07

## 2023-07-06 RX ADMIN — HALOPERIDOL 2 MG: 1 TABLET ORAL at 08:07

## 2023-07-06 RX ADMIN — BISACODYL 10 MG: 10 SUPPOSITORY RECTAL at 08:07

## 2023-07-06 RX ADMIN — LOSARTAN POTASSIUM 100 MG: 100 TABLET, FILM COATED ORAL at 08:07

## 2023-07-06 RX ADMIN — AMPICILLIN SODIUM 2 G: 2 INJECTION, POWDER, FOR SOLUTION INTRAMUSCULAR; INTRAVENOUS at 04:07

## 2023-07-06 RX ADMIN — AMLODIPINE BESYLATE 10 MG: 10 TABLET ORAL at 08:07

## 2023-07-06 RX ADMIN — CLONIDINE HYDROCHLORIDE 0.3 MG: 0.2 TABLET ORAL at 02:07

## 2023-07-06 RX ADMIN — HEPARIN SODIUM 5000 UNITS: 5000 INJECTION INTRAVENOUS; SUBCUTANEOUS at 01:07

## 2023-07-06 RX ADMIN — PANTOPRAZOLE SODIUM 40 MG: 40 TABLET, DELAYED RELEASE ORAL at 08:07

## 2023-07-06 RX ADMIN — DOCUSATE SODIUM 100 MG: 100 CAPSULE, LIQUID FILLED ORAL at 08:07

## 2023-07-06 RX ADMIN — CARVEDILOL 25 MG: 25 TABLET, FILM COATED ORAL at 08:07

## 2023-07-06 RX ADMIN — CLONIDINE HYDROCHLORIDE 0.3 MG: 0.2 TABLET ORAL at 08:07

## 2023-07-06 RX ADMIN — HEPARIN SODIUM 5000 UNITS: 5000 INJECTION INTRAVENOUS; SUBCUTANEOUS at 10:07

## 2023-07-06 RX ADMIN — CARVEDILOL 25 MG: 25 TABLET, FILM COATED ORAL at 04:07

## 2023-07-06 RX ADMIN — BUPROPION HYDROCHLORIDE 150 MG: 150 TABLET, FILM COATED, EXTENDED RELEASE ORAL at 08:07

## 2023-07-06 RX ADMIN — SEVELAMER CARBONATE 800 MG: 800 TABLET, FILM COATED ORAL at 11:07

## 2023-07-06 RX ADMIN — SEVELAMER CARBONATE 800 MG: 800 TABLET, FILM COATED ORAL at 06:07

## 2023-07-06 RX ADMIN — FERROUS SULFATE TAB 325 MG (65 MG ELEMENTAL FE) 1 EACH: 325 (65 FE) TAB at 08:07

## 2023-07-06 RX ADMIN — LACTULOSE 20 G: 20 SOLUTION ORAL at 08:07

## 2023-07-06 RX ADMIN — INSULIN DETEMIR 6 UNITS: 100 INJECTION, SOLUTION SUBCUTANEOUS at 08:07

## 2023-07-06 RX ADMIN — HEPARIN SODIUM 5000 UNITS: 5000 INJECTION INTRAVENOUS; SUBCUTANEOUS at 06:07

## 2023-07-06 RX ADMIN — SEVELAMER CARBONATE 800 MG: 800 TABLET, FILM COATED ORAL at 04:07

## 2023-07-06 RX ADMIN — LINACLOTIDE 145 MCG: 145 CAPSULE, GELATIN COATED ORAL at 06:07

## 2023-07-06 NOTE — SUBJECTIVE & OBJECTIVE
Interval History: Dialysis today. No problems reported. Wound care note reviewed.     Review of Systems  Objective:     Vital Signs (Most Recent):  Temp: 98.4 °F (36.9 °C) (07/06/23 0752)  Pulse: 71 (07/06/23 0752)  Resp: 18 (07/06/23 0752)  BP: (!) 176/65 (07/06/23 0752)  SpO2: 100 % (07/06/23 0752) Vital Signs (24h Range):  Temp:  [96.9 °F (36.1 °C)-98.4 °F (36.9 °C)] 98.4 °F (36.9 °C)  Pulse:  [61-81] 71  Resp:  [17-18] 18  SpO2:  [97 %-100 %] 100 %  BP: (153-199)/(58-93) 176/65     Weight: 74.6 kg (164 lb 7.4 oz)  Body mass index is 29.13 kg/m².    Intake/Output Summary (Last 24 hours) at 7/6/2023 0818  Last data filed at 7/5/2023 1913  Gross per 24 hour   Intake 320 ml   Output 2600 ml   Net -2280 ml         Physical Exam  Constitutional:       Appearance: Normal appearance. He is normal weight.   HENT:      Head: Normocephalic and atraumatic.   Eyes:      Extraocular Movements: Extraocular movements intact.      Conjunctiva/sclera: Conjunctivae normal.      Pupils: Pupils are equal, round, and reactive to light.   Cardiovascular:      Rate and Rhythm: Normal rate and regular rhythm.   Pulmonary:      Effort: Pulmonary effort is normal.      Breath sounds: Normal breath sounds.   Abdominal:      General: There is no distension.   Musculoskeletal:         General: No swelling.      Cervical back: Normal range of motion and neck supple.   Skin:     General: Skin is warm and dry.      Capillary Refill: Capillary refill takes less than 2 seconds.   Neurological:      Mental Status: He is alert and oriented to person, place, and time. Mental status is at baseline.   Psychiatric:         Mood and Affect: Mood normal.         Behavior: Behavior normal.         Thought Content: Thought content normal.         Judgment: Judgment normal.           Significant Labs: All pertinent labs within the past 24 hours have been reviewed.    Significant Imaging: I have reviewed all pertinent imaging results/findings within the past  24 hours.

## 2023-07-06 NOTE — DIALYSIS ROUNDING
The patient was dialyzed today without complication.There was a net fluid removal of 2.2 liters during the treatment today.He has no complaints today    PE  Lungs-clear  Cor-2/6 systolic murmur no rub  Abd-soft     Plan:  Continue the present care
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
## 2023-07-06 NOTE — PROGRESS NOTES
Wound care note;  Patient skin was evaluated today  Patient in bed, Asleep. Easily awaken.  Sacral area with denuded partial thickness skin loss noted. Right ischial wound with moisture, macerated, bleeds easily. .  Talked with VINOD Mendoza FNP evaluated picture,. Started sample of Polymem silver dressing to both areas.   Left heel wound dry, no drainage. Cont silvadene moist drawtex to wound bed.   Cont turn protocol  Waffle boots to heels.   On low air loss mattress   Wound care team to follow.

## 2023-07-06 NOTE — PROGRESS NOTES
[facility]  Nephrology  Progress Note    Patient Name: Lee Escobar  MRN: [id number]  Admission Date: 5/26/2023  Hospital Length of Stay: 41 days  Attending Provider: Macho Fox Jr., MD   Primary Care Physician: Maria Elena Solorio II, MD  Principal Problem:Osteomyelitis of left foot    Consults  Subjective:     Interval History: The patient has no complaints today    Review of patient's allergies indicates:  No Known Allergies  Current Facility-Administered Medications   Medication Frequency    0.9%  NaCl infusion PRN    acetaminophen tablet 650 mg Q4H PRN    ALPRAZolam tablet 0.5 mg TID PRN    amLODIPine tablet 10 mg Daily    ampicillin (OMNIPEN) 2 g in sodium chloride 0.9 % 100 mL IVPB (MB+) Q12H    bisacodyL suppository 10 mg Daily    buPROPion TBSR 12 hr tablet 150 mg BID    carvediloL tablet 25 mg BID WM    cloNIDine tablet 0.3 mg TID    dextrose 50% injection 12.5 g PRN    dextrose 50% injection 25 g PRN    docusate sodium capsule 100 mg BID    ferrous sulfate tablet 1 each Daily    glucagon (human recombinant) injection 1 mg PRN    glucose chewable tablet 16 g PRN    glucose chewable tablet 24 g PRN    haloperidoL tablet 2 mg TID    heparin (porcine) injection 5,000 Units Q8H    hydrALAZINE tablet 25 mg Q8H PRN    insulin aspart U-100 injection 0-5 Units QID (AC + HS) PRN    insulin aspart U-100 injection 5 Units TIDWM    insulin detemir U-100 injection 6 Units QHS    isosorbide mononitrate 24 hr tablet 120 mg Daily    labetaloL injection 10 mg Q4H PRN    lactulose 20 gram/30 mL solution Soln 20 g TID    linaCLOtide capsule 145 mcg Daily PRN    linaCLOtide capsule 145 mcg Before breakfast    losartan tablet 100 mg QHS    melatonin tablet 6 mg Nightly PRN    naloxone 0.4 mg/mL injection 0.02 mg PRN    ondansetron injection 4 mg Q8H PRN    pantoprazole EC tablet 40 mg Daily    sevelamer carbonate tablet 800 mg TIDWM    sodium chloride 0.9% flush 10 mL Q12H PRN       Objective:      Vital Signs (Most Recent):  Temp: 98.5 °F (36.9 °C) (07/06/23 1600)  Pulse: 78 (07/06/23 1600)  Resp: 18 (07/06/23 1600)  BP: (!) 188/60 (07/06/23 1600)  SpO2: 97 % (07/06/23 1600) Vital Signs (24h Range):  Temp:  [98.1 °F (36.7 °C)-98.5 °F (36.9 °C)] 98.5 °F (36.9 °C)  Pulse:  [69-81] 78  Resp:  [18] 18  SpO2:  [97 %-100 %] 97 %  BP: (172-194)/(50-82) 188/60     Weight: 74.6 kg (164 lb 7.4 oz) (07/06/23 0500)  Body mass index is 29.13 kg/m².  Body surface area is 1.82 meters squared.    I/O last 3 completed shifts:  In: 680 [P.O.:480; Other:100; IV Piggyback:100]  Out: 2600 [Other:2600]    Physical Exam  Lungs-clear  Cor-2/6 systolic murmur  Abd-soft    Significant Labs:sureCBC: No results for input(s): WBC, RBC, HGB, HCT, PLT, MCV, MCH, MCHC in the last 168 hours.  CMP: No results for input(s): GLU, CALCIUM, ALBUMIN, PROT, NA, K, CO2, CL, BUN, CREATININE, ALKPHOS, ALT, AST, BILITOT in the last 168 hours.  All labs within the past 24 hours have been reviewed.    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]  Not Applicable     Chronic    Diabetic ulcer of toe of left foot associated with type 2 diabetes mellitus, with bone involvement without evidence of necrosis [E11.621, L97.526] 05/31/2023 Yes    Pressure injury of left heel, unstageable [L89.620] 05/31/2023 Yes    Pressure injury of right buttock, stage 2 [L89.312] 05/31/2023 Yes    HTN (hypertension) [I10] 05/25/2023 Yes     Chronic    Constipation [K59.00] 05/25/2023 Unknown     Chronic    ESRD (end stage renal disease) [N18.6] 05/25/2023 Yes    Wound infection [T14.8XXA, L08.9] 05/25/2023 Yes    Major depression [F32.9] 05/25/2023 Yes      Problems Resolved During this Admission:    Diagnosis Date Noted Date Resolved POA    Right flank pain [R10.9] 06/16/2023 06/18/2023 Yes    Type 2  diabetes mellitus with kidney complication, with long-term current use of insulin [E11.29, Z79.4] 05/25/2023 06/18/2023 Not Applicable     Chronic       Plan:  Hemodialysis tomorrow    Thank you for your consult. I will follow-up with patient. Please contact us if you have any additional questions.    Danny Peter MD  Nephrology  [facility]

## 2023-07-06 NOTE — ASSESSMENT & PLAN NOTE
1st and 2nd toe Left foot (acute) Osteomyelitis. Complicated by moderate vascular disease of Lower extremities.  CTA Bilateral lower extremities reveals moderate to severe calcified vascular disease affects the arteries of the bilateral lower extremities.    -medical management with Vancomycin and cefepime started on 5/25/23  -wound care consulted  5/29: may require biopsy and debridement by general surgery.  Per their last note, follow-up will be this week.    5/30: Wound culture with Proteus and Enterococcus both sensitive to ampicillin, with adjust therapy    Antibiotics (From admission, onward)    Start     Stop Route Frequency Ordered    06/10/23 1700  ampicillin (OMNIPEN) 2 g in sodium chloride 0.9 % 100 mL IVPB (MB+)         07/07 1559 IV Every 12 hours 06/10/23 1650    05/29/23 1130  mupirocin 2 % ointment         06/03 0859 Nasl 2 times daily 05/29/23 1028        6/19 - Course of abx continues.     6/20 - Wound care notes reviewed, continue abx and wound care.     6/24 - Tolerating abx without side effects. Completes 7/6/23 6/29 no problems with itching or antibiotics iv antibiotics 7/6/2023 7/2 continue IV antibiotics  7/5 - Completes abx tomorrow.

## 2023-07-06 NOTE — PLAN OF CARE
Chart reviewed. Patient scheduled to complete IV abx tomorrow morning at 0400. SS discussed with Dr. Fox, patient for possible discharge tomorrow. SS spoke with Rosina at [facility], patient will be accepted back at discharge. SS following.

## 2023-07-07 VITALS
HEART RATE: 74 BPM | RESPIRATION RATE: 18 BRPM | SYSTOLIC BLOOD PRESSURE: 179 MMHG | TEMPERATURE: 98 F | OXYGEN SATURATION: 98 % | WEIGHT: 160.25 LBS | BODY MASS INDEX: 28.39 KG/M2 | DIASTOLIC BLOOD PRESSURE: 71 MMHG | HEIGHT: 63 IN

## 2023-07-07 PROBLEM — L89.312 PRESSURE INJURY OF RIGHT BUTTOCK, STAGE 2: Status: RESOLVED | Noted: 2023-05-31 | Resolved: 2023-07-07

## 2023-07-07 PROBLEM — L89.620 PRESSURE INJURY OF LEFT HEEL, UNSTAGEABLE: Status: RESOLVED | Noted: 2023-05-31 | Resolved: 2023-07-07

## 2023-07-07 PROBLEM — K59.00 CONSTIPATION: Chronic | Status: RESOLVED | Noted: 2023-05-25 | Resolved: 2023-07-07

## 2023-07-07 LAB
ALBUMIN SERPL BCP-MCNC: 2.3 G/DL (ref 3.5–5)
ALBUMIN/GLOB SERPL: 0.5 {RATIO}
ALP SERPL-CCNC: 113 U/L (ref 45–115)
ALT SERPL W P-5'-P-CCNC: 11 U/L (ref 16–61)
ANION GAP SERPL CALCULATED.3IONS-SCNC: 13 MMOL/L (ref 7–16)
AST SERPL W P-5'-P-CCNC: 8 U/L (ref 15–37)
BASOPHILS # BLD AUTO: 0.05 K/UL (ref 0–0.2)
BASOPHILS NFR BLD AUTO: 0.7 % (ref 0–1)
BILIRUB SERPL-MCNC: 0.5 MG/DL (ref ?–1.2)
BUN SERPL-MCNC: 56 MG/DL (ref 7–18)
BUN/CREAT SERPL: 11 (ref 6–20)
CALCIUM SERPL-MCNC: 9.3 MG/DL (ref 8.5–10.1)
CHLORIDE SERPL-SCNC: 96 MMOL/L (ref 98–107)
CO2 SERPL-SCNC: 29 MMOL/L (ref 21–32)
CREAT SERPL-MCNC: 5.32 MG/DL (ref 0.7–1.3)
DIFFERENTIAL METHOD BLD: ABNORMAL
EGFR (NO RACE VARIABLE) (RUSH/TITUS): 13 ML/MIN/1.73M2
EOSINOPHIL # BLD AUTO: 0.95 K/UL (ref 0–0.5)
EOSINOPHIL NFR BLD AUTO: 13.7 % (ref 1–4)
EOSINOPHIL NFR BLD MANUAL: 12 % (ref 1–4)
ERYTHROCYTE [DISTWIDTH] IN BLOOD BY AUTOMATED COUNT: 13.5 % (ref 11.5–14.5)
GLOBULIN SER-MCNC: 5 G/DL (ref 2–4)
GLUCOSE SERPL-MCNC: 162 MG/DL (ref 70–105)
GLUCOSE SERPL-MCNC: 163 MG/DL (ref 74–106)
HCT VFR BLD AUTO: 24.1 % (ref 40–54)
HGB BLD-MCNC: 7.6 G/DL (ref 13.5–18)
IMM GRANULOCYTES # BLD AUTO: 0.02 K/UL (ref 0–0.04)
IMM GRANULOCYTES NFR BLD: 0.3 % (ref 0–0.4)
LYMPHOCYTES # BLD AUTO: 0.69 K/UL (ref 1–4.8)
LYMPHOCYTES NFR BLD AUTO: 10 % (ref 27–41)
LYMPHOCYTES NFR BLD MANUAL: 12 % (ref 27–41)
MCH RBC QN AUTO: 27.2 PG (ref 27–31)
MCHC RBC AUTO-ENTMCNC: 31.5 G/DL (ref 32–36)
MCV RBC AUTO: 86.4 FL (ref 80–96)
METAMYELOCYTES NFR BLD MANUAL: 1 %
MONOCYTES # BLD AUTO: 0.66 K/UL (ref 0–0.8)
MONOCYTES NFR BLD AUTO: 9.5 % (ref 2–6)
MONOCYTES NFR BLD MANUAL: 7 % (ref 2–6)
MPC BLD CALC-MCNC: 10.2 FL (ref 9.4–12.4)
MYELOCYTES NFR BLD MANUAL: 1 %
NEUTROPHILS # BLD AUTO: 4.55 K/UL (ref 1.8–7.7)
NEUTROPHILS NFR BLD AUTO: 65.8 % (ref 53–65)
NEUTS SEG NFR BLD MANUAL: 67 % (ref 50–62)
NRBC # BLD AUTO: 0 X10E3/UL
NRBC, AUTO (.00): 0 %
PHOSPHATE SERPL-MCNC: 3.4 MG/DL (ref 2.5–4.5)
PLATELET # BLD AUTO: 180 K/UL (ref 150–400)
PLATELET MORPHOLOGY: NORMAL
POTASSIUM SERPL-SCNC: 5.1 MMOL/L (ref 3.5–5.1)
PROT SERPL-MCNC: 7.3 G/DL (ref 6.4–8.2)
RBC # BLD AUTO: 2.79 M/UL (ref 4.6–6.2)
RBC MORPH BLD: NORMAL
SODIUM SERPL-SCNC: 133 MMOL/L (ref 136–145)
WBC # BLD AUTO: 6.92 K/UL (ref 4.5–11)

## 2023-07-07 PROCEDURE — 25000003 PHARM REV CODE 250: Performed by: INTERNAL MEDICINE

## 2023-07-07 PROCEDURE — 25000003 PHARM REV CODE 250: Performed by: FAMILY MEDICINE

## 2023-07-07 PROCEDURE — 63600175 PHARM REV CODE 636 W HCPCS: Performed by: FAMILY MEDICINE

## 2023-07-07 PROCEDURE — 25000003 PHARM REV CODE 250

## 2023-07-07 PROCEDURE — 25000003 PHARM REV CODE 250: Performed by: NURSE PRACTITIONER

## 2023-07-07 PROCEDURE — 63600175 PHARM REV CODE 636 W HCPCS: Performed by: HOSPITALIST

## 2023-07-07 PROCEDURE — 84100 ASSAY OF PHOSPHORUS: CPT | Performed by: INTERNAL MEDICINE

## 2023-07-07 PROCEDURE — 80053 COMPREHEN METABOLIC PANEL: CPT | Performed by: INTERNAL MEDICINE

## 2023-07-07 PROCEDURE — 25000003 PHARM REV CODE 250: Performed by: HOSPITALIST

## 2023-07-07 PROCEDURE — 85025 COMPLETE CBC W/AUTO DIFF WBC: CPT | Performed by: INTERNAL MEDICINE

## 2023-07-07 PROCEDURE — 63600175 PHARM REV CODE 636 W HCPCS: Performed by: NURSE PRACTITIONER

## 2023-07-07 PROCEDURE — 82962 GLUCOSE BLOOD TEST: CPT

## 2023-07-07 PROCEDURE — 90935 HEMODIALYSIS ONE EVALUATION: CPT

## 2023-07-07 RX ORDER — CLONIDINE HYDROCHLORIDE 0.3 MG/1
0.3 TABLET ORAL 3 TIMES DAILY
Qty: 90 TABLET | Refills: 11 | Status: SHIPPED | OUTPATIENT
Start: 2023-07-07 | End: 2024-07-06

## 2023-07-07 RX ORDER — FERROUS SULFATE 325(65) MG
325 TABLET, DELAYED RELEASE (ENTERIC COATED) ORAL DAILY
Start: 2023-07-07

## 2023-07-07 RX ORDER — ISOSORBIDE MONONITRATE 120 MG/1
120 TABLET, EXTENDED RELEASE ORAL DAILY
Qty: 30 TABLET | Refills: 11
Start: 2023-07-07 | End: 2024-07-06

## 2023-07-07 RX ORDER — INSULIN ASPART 100 [IU]/ML
5 INJECTION, SOLUTION INTRAVENOUS; SUBCUTANEOUS 3 TIMES DAILY
Qty: 4.5 ML | Refills: 11 | Status: SHIPPED | OUTPATIENT
Start: 2023-07-07 | End: 2024-07-06

## 2023-07-07 RX ORDER — BISACODYL 10 MG
10 SUPPOSITORY, RECTAL RECTAL DAILY
Refills: 0
Start: 2023-07-07

## 2023-07-07 RX ORDER — HALOPERIDOL 2 MG/1
2 TABLET ORAL 2 TIMES DAILY
Qty: 60 TABLET | Refills: 11 | Status: SHIPPED | OUTPATIENT
Start: 2023-07-07 | End: 2024-07-06

## 2023-07-07 RX ORDER — HYDRALAZINE HYDROCHLORIDE 25 MG/1
25 TABLET, FILM COATED ORAL EVERY 8 HOURS
Start: 2023-07-07 | End: 2024-07-06

## 2023-07-07 RX ORDER — MINOXIDIL 2.5 MG/1
2.5 TABLET ORAL DAILY
Status: DISCONTINUED | OUTPATIENT
Start: 2023-07-07 | End: 2023-07-07 | Stop reason: HOSPADM

## 2023-07-07 RX ORDER — INSULIN ASPART 100 [IU]/ML
0-5 INJECTION, SOLUTION INTRAVENOUS; SUBCUTANEOUS
Start: 2023-07-07 | End: 2024-07-06

## 2023-07-07 RX ADMIN — FERROUS SULFATE TAB 325 MG (65 MG ELEMENTAL FE) 1 EACH: 325 (65 FE) TAB at 11:07

## 2023-07-07 RX ADMIN — ALPRAZOLAM 0.5 MG: 0.25 TABLET ORAL at 02:07

## 2023-07-07 RX ADMIN — AMPICILLIN SODIUM 2 G: 2 INJECTION, POWDER, FOR SOLUTION INTRAMUSCULAR; INTRAVENOUS at 04:07

## 2023-07-07 RX ADMIN — SEVELAMER CARBONATE 800 MG: 800 TABLET, FILM COATED ORAL at 06:07

## 2023-07-07 RX ADMIN — MINOXIDIL 2.5 MG: 2.5 TABLET ORAL at 11:07

## 2023-07-07 RX ADMIN — ISOSORBIDE MONONITRATE 120 MG: 30 TABLET, EXTENDED RELEASE ORAL at 11:07

## 2023-07-07 RX ADMIN — AMLODIPINE BESYLATE 10 MG: 10 TABLET ORAL at 11:07

## 2023-07-07 RX ADMIN — CLONIDINE HYDROCHLORIDE 0.3 MG: 0.2 TABLET ORAL at 11:07

## 2023-07-07 RX ADMIN — HYDRALAZINE HYDROCHLORIDE 25 MG: 25 TABLET ORAL at 02:07

## 2023-07-07 RX ADMIN — HEPARIN SODIUM 5000 UNITS: 5000 INJECTION INTRAVENOUS; SUBCUTANEOUS at 05:07

## 2023-07-07 RX ADMIN — PANTOPRAZOLE SODIUM 40 MG: 40 TABLET, DELAYED RELEASE ORAL at 11:07

## 2023-07-07 RX ADMIN — SEVELAMER CARBONATE 800 MG: 800 TABLET, FILM COATED ORAL at 11:07

## 2023-07-07 RX ADMIN — BUPROPION HYDROCHLORIDE 150 MG: 150 TABLET, FILM COATED, EXTENDED RELEASE ORAL at 11:07

## 2023-07-07 RX ADMIN — INSULIN ASPART 5 UNITS: 100 INJECTION, SOLUTION INTRAVENOUS; SUBCUTANEOUS at 06:07
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
## 2023-07-07 NOTE — ASSESSMENT & PLAN NOTE
1st and 2nd toe Left foot (acute) Osteomyelitis. Complicated by moderate vascular disease of Lower extremities.  CTA Bilateral lower extremities reveals moderate to severe calcified vascular disease affects the arteries of the bilateral lower extremities.    -medical management with Vancomycin and cefepime started on 5/25/23  -wound care consulted  5/29: may require biopsy and debridement by general surgery.  Per their last note, follow-up will be this week.    5/30: Wound culture with Proteus and Enterococcus both sensitive to ampicillin, with adjust therapy    Antibiotics (From admission, onward)    Start     Stop Route Frequency Ordered    05/29/23 1130  mupirocin 2 % ointment         06/03 0859 Nasl 2 times daily 05/29/23 1028        6/19 - Course of abx continues.     6/20 - Wound care notes reviewed, continue abx and wound care.     6/24 - Tolerating abx without side effects. Completes 7/6/23 6/29 no problems with itching or antibiotics iv antibiotics 7/6/2023 7/2 continue IV antibiotics  7/5 - Completes abx tomorrow.   7/6 - Last dose abx will be tonight.

## 2023-07-07 NOTE — ASSESSMENT & PLAN NOTE
1st and 2nd toe Left foot (acute) Osteomyelitis. Complicated by moderate vascular disease of Lower extremities.  CTA Bilateral lower extremities reveals moderate to severe calcified vascular disease affects the arteries of the bilateral lower extremities.    -medical management with Vancomycin and cefepime started on 5/25/23  -wound care consulted  5/29: may require biopsy and debridement by general surgery.  Per their last note, follow-up will be this week.    5/30: Wound culture with Proteus and Enterococcus both sensitive to ampicillin, with adjust therapy    Antibiotics (From admission, onward)    Start     Stop Route Frequency Ordered    05/29/23 1130  mupirocin 2 % ointment         06/03 0859 Nasl 2 times daily 05/29/23 1028        6/19 - Course of abx continues.     6/20 - Wound care notes reviewed, continue abx and wound care.     6/24 - Tolerating abx without side effects. Completes 7/6/23 6/29 no problems with itching or antibiotics iv antibiotics 7/6/2023 7/2 continue IV antibiotics  7/5 - Completes abx tomorrow.   7/6 - Last dose abx will be tonight.   7/7 - treatment complete.

## 2023-07-07 NOTE — DIALYSIS ROUNDING
The patient was dialyzed today without complication.He tolerated the procedure well.  He remains hypertensive today despite receiving a dose of minoxidil which was started today.

## 2023-07-07 NOTE — PLAN OF CARE
[facility]  Discharge Final Note    Primary Care Provider: Maria Elena Solorio II, MD    Expected Discharge Date:     Final Discharge Note (most recent)       Final Note - 07/07/23 1144          Final Note    Assessment Type Final Discharge Note     Anticipated Discharge Disposition Skilled Nursing Facility        Post-Acute Status    Post-Acute Authorization Placement     Post-Acute Placement Status Set-up Complete/Auth obtained     Patient choice form signed by patient/caregiver List with quality metrics by geographic area provided;List from CMS Compare;List from System Post-Acute Care     Discharge Delays None known at this time                   Patient discharging back to [facility] today. Packet placed in patient's cubby. Discharge information faxed. No further dc needs noted at this time.     Important Message from Medicare  Important Message from Medicare regarding Discharge Appeal Rights: Given to patient/caregiver, Explained to patient/caregiver, Signed/date by patient/caregiver     Date IMM was signed: 07/07/23  Time IMM was signed: 1142     Follow-up providers       [facility]    [address] [place] [address]   Phone: [phone]       Next Steps: Schedule an appointment as soon as possible for a visit    Instructions: [facility] [place]note 2[place] TO MAKE FOLLOW-UP WITH [facility]    COLTEN Coreas   Specialty: General Surgery, Wound Care    [address]  [place] [address]   Phone: [phone]       Next Steps: Follow up in 2 week(s)    Instructions: Appt: 07/21/2023 @ 0900              After-discharge care                Destination       [facility]   Service: Nursing Home    [address] [place] [address]   Phone: [phone]
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
## 2023-07-07 NOTE — SUBJECTIVE & OBJECTIVE
Interval History: Feels well. No more delirium.  Finishes abx tonight.     Review of Systems  Objective:     Vital Signs (Most Recent):  Temp: 98.3 °F (36.8 °C) (07/07/23 0400)  Pulse: 68 (07/07/23 0400)  Resp: 16 (07/07/23 0400)  BP: (!) 177/45 (07/07/23 0400)  SpO2: 97 % (07/07/23 0400) Vital Signs (24h Range):  Temp:  [98.2 °F (36.8 °C)-98.5 °F (36.9 °C)] 98.3 °F (36.8 °C)  Pulse:  [68-79] 68  Resp:  [16-18] 16  SpO2:  [97 %-98 %] 97 %  BP: (177-194)/(45-60) 177/45     Weight: 72.7 kg (160 lb 4.4 oz)  Body mass index is 28.39 kg/m².    Intake/Output Summary (Last 24 hours) at 7/7/2023 0804  Last data filed at 7/7/2023 0530  Gross per 24 hour   Intake 1080 ml   Output --   Net 1080 ml         Physical Exam  Constitutional:       Appearance: Normal appearance. He is normal weight.   HENT:      Head: Normocephalic and atraumatic.   Eyes:      Extraocular Movements: Extraocular movements intact.      Conjunctiva/sclera: Conjunctivae normal.      Pupils: Pupils are equal, round, and reactive to light.   Cardiovascular:      Rate and Rhythm: Normal rate and regular rhythm.   Pulmonary:      Effort: Pulmonary effort is normal.      Breath sounds: Normal breath sounds.   Abdominal:      General: There is no distension.   Musculoskeletal:         General: No swelling.      Cervical back: Normal range of motion and neck supple.   Skin:     General: Skin is warm and dry.      Capillary Refill: Capillary refill takes less than 2 seconds.   Neurological:      Mental Status: He is alert and oriented to person, place, and time. Mental status is at baseline.   Psychiatric:         Mood and Affect: Mood normal.         Behavior: Behavior normal.         Thought Content: Thought content normal.         Judgment: Judgment normal.           Significant Labs: All pertinent labs within the past 24 hours have been reviewed.  BMP:   Recent Labs   Lab 07/07/23  0414   *   *   K 5.1   CL 96*   CO2 29   BUN 56*   CREATININE  5.32*   CALCIUM 9.3     CBC:   Recent Labs   Lab 07/07/23  0414   WBC 6.92   HGB 7.6*   HCT 24.1*          Significant Imaging: I have reviewed all pertinent imaging results/findings within the past 24 hours.

## 2023-07-07 NOTE — PROGRESS NOTES
Discharge Note  Patient is 160 pounds with a BMI of 28.39. He is on an 1800kcal Consistent Carbohydrate Diet with Mendez BID to aid in wound healing and tolerating well. Documented intake 25-50%. Recommend continue current diet as able/appropriate and tolerated. Encourage good po intakes.

## 2023-07-07 NOTE — PROGRESS NOTES
Wound care note:  Patient skin was evaluated today.   Patient back from dialysis   Right ischial and sacral wound looks better today, drier in appearance, no bleeding noted to Right ischial.   D/C back to nursing home today.   Applied sample Polymem to Right ischial and sacral .  Orders to be written per VINOD Mendoza FNP.   Left heel wound dry, pink. Cont Silvadene cream daily.   Left great toe and 2nd toe dry. No drainage noted.   Explained to patient to limit time up in wheel chair once back at nursing home. Explained the disadvantage of using donut cushion. Voiced understanding.   Appt made for 2 weeks for follow up in [facility].

## 2023-07-10 ENCOUNTER — PATIENT OUTREACH (OUTPATIENT)
Dept: ADMINISTRATIVE | Facility: CLINIC | Age: 38
End: 2023-07-10
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
## 2023-08-05 ENCOUNTER — INFUSION (OUTPATIENT)
Dept: INFUSION THERAPY | Facility: HOSPITAL | Age: 38
End: 2023-08-05
Attending: FAMILY MEDICINE
Payer: MEDICARE

## 2023-08-05 VITALS
TEMPERATURE: 100 F | RESPIRATION RATE: 20 BRPM | HEART RATE: 81 BPM | DIASTOLIC BLOOD PRESSURE: 52 MMHG | SYSTOLIC BLOOD PRESSURE: 168 MMHG | OXYGEN SATURATION: 98 % | HEIGHT: 65 IN | BODY MASS INDEX: 26.67 KG/M2

## 2023-08-05 DIAGNOSIS — D64.9 ANEMIA, UNSPECIFIED TYPE: Primary | ICD-10-CM

## 2023-08-05 LAB — GLUCOSE SERPL-MCNC: 224 MG/DL (ref 70–105)

## 2023-08-05 PROCEDURE — 25000003 PHARM REV CODE 250: Performed by: NURSE PRACTITIONER

## 2023-08-05 PROCEDURE — 36430 TRANSFUSION BLD/BLD COMPNT: CPT

## 2023-08-05 PROCEDURE — P9016 RBC LEUKOCYTES REDUCED: HCPCS

## 2023-08-05 RX ORDER — HYDROCODONE BITARTRATE AND ACETAMINOPHEN 500; 5 MG/1; MG/1
TABLET ORAL
Status: DISPENSED | OUTPATIENT
Start: 2023-08-05

## 2023-08-05 RX ADMIN — SODIUM CHLORIDE: 9 INJECTION, SOLUTION INTRAVENOUS at 12:08

## 2023-08-05 NOTE — PROGRESS NOTES
PT lying quietly in bed with eyes closed. Resp even. Packed RBC continue to infuse. Will continue to monitor

## 2023-08-05 NOTE — PROGRESS NOTES
Rec'd pt from [facility] to room 1121. PT was in wheelchair upon arrival to room. Pt place in bed. AAOX3. Resp evn. No c/o pain or discomfort voiced

## 2023-08-05 NOTE — PROGRESS NOTES
First unit of Packed RBC's completed at this time. No c/o pain or discomfort voiced. No reaction noted or voiced

## 2023-08-19 ENCOUNTER — HOSPITAL ENCOUNTER (EMERGENCY)
Facility: HOSPITAL | Age: 38
Discharge: HOME | End: 2023-08-20
Attending: EMERGENCY MEDICINE
Payer: MEDICARE

## 2023-08-19 DIAGNOSIS — N39.0 URINARY TRACT INFECTION WITHOUT HEMATURIA, SITE UNSPECIFIED: Primary | ICD-10-CM

## 2023-08-19 LAB
ALBUMIN SERPL BCP-MCNC: 2.6 G/DL (ref 3.5–5)
ALBUMIN/GLOB SERPL: 0.5 {RATIO}
ALP SERPL-CCNC: 109 U/L (ref 45–115)
ALT SERPL W P-5'-P-CCNC: 20 U/L (ref 16–61)
AMORPHOUS CRYSTALS, UA (OHS): ABNORMAL /HPF
ANION GAP SERPL CALCULATED.3IONS-SCNC: 15 MMOL/L (ref 7–16)
AST SERPL W P-5'-P-CCNC: 15 U/L (ref 15–37)
BACTERIA #/AREA URNS HPF: ABNORMAL /HPF
BASOPHILS # BLD AUTO: 0.02 K/UL (ref 0–0.2)
BASOPHILS NFR BLD AUTO: 0.3 % (ref 0–1)
BILIRUB SERPL-MCNC: 0.5 MG/DL (ref ?–1.2)
BILIRUB UR QL STRIP: NEGATIVE
BUN SERPL-MCNC: 33 MG/DL (ref 7–18)
BUN/CREAT SERPL: 9 (ref 6–20)
CALCIUM SERPL-MCNC: 9.2 MG/DL (ref 8.5–10.1)
CHLORIDE SERPL-SCNC: 104 MMOL/L (ref 98–107)
CLARITY UR: ABNORMAL
CO2 SERPL-SCNC: 22 MMOL/L (ref 21–32)
COLOR UR: ABNORMAL
CREAT SERPL-MCNC: 3.51 MG/DL (ref 0.7–1.3)
CRENATED CELLS: ABNORMAL
DIFFERENTIAL METHOD BLD: ABNORMAL
EGFR (NO RACE VARIABLE) (RUSH/TITUS): 22 ML/MIN/1.73M2
EOSINOPHIL # BLD AUTO: 0.01 K/UL (ref 0–0.5)
EOSINOPHIL NFR BLD AUTO: 0.1 % (ref 1–4)
ERYTHROCYTE [DISTWIDTH] IN BLOOD BY AUTOMATED COUNT: 14.1 % (ref 11.5–14.5)
GLOBULIN SER-MCNC: 5.2 G/DL (ref 2–4)
GLUCOSE SERPL-MCNC: 187 MG/DL (ref 74–106)
GLUCOSE UR STRIP-MCNC: NORMAL MG/DL
HCT VFR BLD AUTO: 30.1 % (ref 40–54)
HGB BLD-MCNC: 9.9 G/DL (ref 13.5–18)
IMM GRANULOCYTES # BLD AUTO: 0.02 K/UL (ref 0–0.04)
IMM GRANULOCYTES NFR BLD: 0.3 % (ref 0–0.4)
KETONES UR STRIP-SCNC: NEGATIVE MG/DL
LACTATE SERPL-SCNC: 0.7 MMOL/L (ref 0.4–2)
LEUKOCYTE ESTERASE UR QL STRIP: ABNORMAL
LIPASE SERPL-CCNC: <19 U/L (ref 73–393)
LYMPHOCYTES # BLD AUTO: 0.28 K/UL (ref 1–4.8)
LYMPHOCYTES NFR BLD AUTO: 3.7 % (ref 27–41)
LYMPHOCYTES NFR BLD MANUAL: 2 % (ref 27–41)
MCH RBC QN AUTO: 28.9 PG (ref 27–31)
MCHC RBC AUTO-ENTMCNC: 32.9 G/DL (ref 32–36)
MCV RBC AUTO: 87.8 FL (ref 80–96)
MONOCYTES # BLD AUTO: 0.17 K/UL (ref 0–0.8)
MONOCYTES NFR BLD AUTO: 2.3 % (ref 2–6)
MONOCYTES NFR BLD MANUAL: 3 % (ref 2–6)
MPC BLD CALC-MCNC: 10.2 FL (ref 9.4–12.4)
NEUTROPHILS # BLD AUTO: 6.99 K/UL (ref 1.8–7.7)
NEUTROPHILS NFR BLD AUTO: 93.3 % (ref 53–65)
NEUTS BAND NFR BLD MANUAL: 1 % (ref 1–5)
NEUTS SEG NFR BLD MANUAL: 94 % (ref 50–62)
NITRITE UR QL STRIP: NEGATIVE
NRBC # BLD AUTO: 0 X10E3/UL
NRBC, AUTO (.00): 0 %
OVALOCYTES BLD QL SMEAR: ABNORMAL
PH UR STRIP: 7.5 PH UNITS
PLATELET # BLD AUTO: 232 K/UL (ref 150–400)
PLATELET MORPHOLOGY: NORMAL
POTASSIUM SERPL-SCNC: 3.9 MMOL/L (ref 3.5–5.1)
PROT SERPL-MCNC: 7.8 G/DL (ref 6.4–8.2)
PROT UR QL STRIP: 300
RBC # BLD AUTO: 3.43 M/UL (ref 4.6–6.2)
RBC # UR STRIP: ABNORMAL /UL
RBC #/AREA URNS HPF: 11 /HPF
SODIUM SERPL-SCNC: 137 MMOL/L (ref 136–145)
SP GR UR STRIP: 1.01
SQUAMOUS #/AREA URNS LPF: ABNORMAL /HPF
UROBILINOGEN UR STRIP-ACNC: NORMAL MG/DL
WBC # BLD AUTO: 7.49 K/UL (ref 4.5–11)
WBC #/AREA URNS HPF: 179 /HPF

## 2023-08-19 PROCEDURE — 80053 COMPREHEN METABOLIC PANEL: CPT | Performed by: EMERGENCY MEDICINE

## 2023-08-19 PROCEDURE — 96375 TX/PRO/DX INJ NEW DRUG ADDON: CPT

## 2023-08-19 PROCEDURE — 85025 COMPLETE CBC W/AUTO DIFF WBC: CPT | Performed by: EMERGENCY MEDICINE

## 2023-08-19 PROCEDURE — 99284 PR EMERGENCY DEPT VISIT,LEVEL IV: ICD-10-PCS | Mod: ,,, | Performed by: EMERGENCY MEDICINE

## 2023-08-19 PROCEDURE — 96365 THER/PROPH/DIAG IV INF INIT: CPT

## 2023-08-19 PROCEDURE — 83690 ASSAY OF LIPASE: CPT | Performed by: EMERGENCY MEDICINE

## 2023-08-19 PROCEDURE — 83605 ASSAY OF LACTIC ACID: CPT | Performed by: EMERGENCY MEDICINE

## 2023-08-19 PROCEDURE — 99284 EMERGENCY DEPT VISIT MOD MDM: CPT | Mod: ,,, | Performed by: EMERGENCY MEDICINE

## 2023-08-19 PROCEDURE — 25000003 PHARM REV CODE 250: Performed by: EMERGENCY MEDICINE

## 2023-08-19 PROCEDURE — 87077 CULTURE AEROBIC IDENTIFY: CPT | Performed by: EMERGENCY MEDICINE

## 2023-08-19 PROCEDURE — 87186 SC STD MICRODIL/AGAR DIL: CPT | Performed by: EMERGENCY MEDICINE

## 2023-08-19 PROCEDURE — 63600175 PHARM REV CODE 636 W HCPCS: Performed by: EMERGENCY MEDICINE

## 2023-08-19 PROCEDURE — 81001 URINALYSIS AUTO W/SCOPE: CPT | Performed by: EMERGENCY MEDICINE

## 2023-08-19 PROCEDURE — 99284 EMERGENCY DEPT VISIT MOD MDM: CPT | Mod: 25

## 2023-08-19 RX ORDER — MORPHINE SULFATE 4 MG/ML
4 INJECTION, SOLUTION INTRAMUSCULAR; INTRAVENOUS
Status: COMPLETED | OUTPATIENT
Start: 2023-08-19 | End: 2023-08-19

## 2023-08-19 RX ORDER — ONDANSETRON 2 MG/ML
4 INJECTION INTRAMUSCULAR; INTRAVENOUS
Status: COMPLETED | OUTPATIENT
Start: 2023-08-19 | End: 2023-08-19

## 2023-08-19 RX ADMIN — MORPHINE SULFATE 4 MG: 4 INJECTION, SOLUTION INTRAMUSCULAR; INTRAVENOUS at 10:08

## 2023-08-19 RX ADMIN — ONDANSETRON 4 MG: 2 INJECTION INTRAMUSCULAR; INTRAVENOUS at 10:08

## 2023-08-19 RX ADMIN — DEXTROSE MONOHYDRATE 2 G: 5 INJECTION INTRAVENOUS at 10:08

## 2023-08-20 VITALS
TEMPERATURE: 99 F | DIASTOLIC BLOOD PRESSURE: 83 MMHG | OXYGEN SATURATION: 99 % | SYSTOLIC BLOOD PRESSURE: 185 MMHG | RESPIRATION RATE: 18 BRPM | WEIGHT: 155 LBS | HEART RATE: 85 BPM | BODY MASS INDEX: 25.79 KG/M2

## 2023-08-20 PROBLEM — N39.0 URINARY TRACT INFECTION WITHOUT HEMATURIA: Status: ACTIVE | Noted: 2023-08-20

## 2023-08-20 PROCEDURE — 96375 TX/PRO/DX INJ NEW DRUG ADDON: CPT

## 2023-08-20 PROCEDURE — 63600175 PHARM REV CODE 636 W HCPCS: Performed by: EMERGENCY MEDICINE

## 2023-08-20 RX ORDER — MORPHINE SULFATE 4 MG/ML
4 INJECTION, SOLUTION INTRAMUSCULAR; INTRAVENOUS
Status: COMPLETED | OUTPATIENT
Start: 2023-08-20 | End: 2023-08-20

## 2023-08-20 RX ORDER — CEFDINIR 300 MG/1
300 CAPSULE ORAL 2 TIMES DAILY
Qty: 20 CAPSULE | Refills: 0 | Status: SHIPPED | OUTPATIENT
Start: 2023-08-20 | End: 2023-08-30

## 2023-08-20 RX ADMIN — MORPHINE SULFATE 4 MG: 4 INJECTION, SOLUTION INTRAMUSCULAR; INTRAVENOUS at 12:08
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
## 2023-08-21 LAB — UA COMPLETE W REFLEX CULTURE PNL UR: ABNORMAL

## 2023-10-02 PROBLEM — A41.9 SEPSIS: Status: RESOLVED | Noted: 2019-05-25 | Resolved: 2023-10-02

## 2023-10-24 ENCOUNTER — HOSPITAL ENCOUNTER (INPATIENT)
Facility: HOSPITAL | Age: 38
LOS: 5 days | Discharge: NURSING, REHAB OR LONG-TERM CARE | DRG: 853 | End: 2023-10-29
Attending: FAMILY MEDICINE | Admitting: INTERNAL MEDICINE
Payer: MEDICARE

## 2023-10-24 DIAGNOSIS — G82.20 PARAPLEGIA, UNSPECIFIED: ICD-10-CM

## 2023-10-24 DIAGNOSIS — I50.9 CHF (CONGESTIVE HEART FAILURE): ICD-10-CM

## 2023-10-24 DIAGNOSIS — R52 PAIN: ICD-10-CM

## 2023-10-24 DIAGNOSIS — T14.8XXA WOUND INFECTION: ICD-10-CM

## 2023-10-24 DIAGNOSIS — E43 SEVERE PROTEIN-CALORIE MALNUTRITION: ICD-10-CM

## 2023-10-24 DIAGNOSIS — R41.82 ALTERED MENTAL STATUS: ICD-10-CM

## 2023-10-24 DIAGNOSIS — R07.9 CHEST PAIN: ICD-10-CM

## 2023-10-24 DIAGNOSIS — G93.41 ENCEPHALOPATHY, METABOLIC: Primary | ICD-10-CM

## 2023-10-24 DIAGNOSIS — L08.9 WOUND INFECTION: ICD-10-CM

## 2023-10-24 LAB
ALBUMIN SERPL BCP-MCNC: 1.4 G/DL (ref 3.5–5)
ALBUMIN/GLOB SERPL: 0.3 {RATIO}
ALP SERPL-CCNC: 147 U/L (ref 45–115)
ALT SERPL W P-5'-P-CCNC: 4 U/L (ref 16–61)
ANION GAP SERPL CALCULATED.3IONS-SCNC: 10 MMOL/L (ref 7–16)
AST SERPL W P-5'-P-CCNC: 3 U/L (ref 15–37)
BACTERIA #/AREA URNS HPF: ABNORMAL /HPF
BASOPHILS # BLD AUTO: 0.03 K/UL (ref 0–0.2)
BASOPHILS NFR BLD AUTO: 0.2 % (ref 0–1)
BILIRUB SERPL-MCNC: 0.8 MG/DL (ref ?–1.2)
BILIRUB UR QL STRIP: NEGATIVE
BUN SERPL-MCNC: 36 MG/DL (ref 7–18)
BUN/CREAT SERPL: 13 (ref 6–20)
CALCIUM SERPL-MCNC: 8.4 MG/DL (ref 8.5–10.1)
CHLORIDE SERPL-SCNC: 107 MMOL/L (ref 98–107)
CLARITY UR: ABNORMAL
CO2 SERPL-SCNC: 28 MMOL/L (ref 21–32)
COLOR UR: ABNORMAL
CREAT SERPL-MCNC: 2.8 MG/DL (ref 0.7–1.3)
DIFFERENTIAL METHOD BLD: ABNORMAL
EGFR (NO RACE VARIABLE) (RUSH/TITUS): 29 ML/MIN/1.73M2
EOSINOPHIL # BLD AUTO: 0.02 K/UL (ref 0–0.5)
EOSINOPHIL NFR BLD AUTO: 0.1 % (ref 1–4)
ERYTHROCYTE [DISTWIDTH] IN BLOOD BY AUTOMATED COUNT: 14.7 % (ref 11.5–14.5)
EST. AVERAGE GLUCOSE BLD GHB EST-MCNC: 71 MG/DL
GLOBULIN SER-MCNC: 4.1 G/DL (ref 2–4)
GLUCOSE SERPL-MCNC: 291 MG/DL (ref 74–106)
GLUCOSE UR STRIP-MCNC: NORMAL MG/DL
HBA1C MFR BLD HPLC: 4.7 % (ref 4.5–6.6)
HCT VFR BLD AUTO: 28.9 % (ref 40–54)
HGB BLD-MCNC: 9.9 G/DL (ref 13.5–18)
HYPOCHROMIA BLD QL SMEAR: ABNORMAL
IMM GRANULOCYTES # BLD AUTO: 0.64 K/UL (ref 0–0.04)
IMM GRANULOCYTES NFR BLD: 3.7 % (ref 0–0.4)
KETONES UR STRIP-SCNC: NEGATIVE MG/DL
LACTATE SERPL-SCNC: 0.5 MMOL/L (ref 0.4–2)
LEUKOCYTE ESTERASE UR QL STRIP: ABNORMAL
LYMPHOCYTES # BLD AUTO: 0.39 K/UL (ref 1–4.8)
LYMPHOCYTES NFR BLD AUTO: 2.3 % (ref 27–41)
LYMPHOCYTES NFR BLD MANUAL: 6 % (ref 27–41)
MCH RBC QN AUTO: 27.8 PG (ref 27–31)
MCHC RBC AUTO-ENTMCNC: 34.3 G/DL (ref 32–36)
MCV RBC AUTO: 81.2 FL (ref 80–96)
MONOCYTES # BLD AUTO: 1.37 K/UL (ref 0–0.8)
MONOCYTES NFR BLD AUTO: 8 % (ref 2–6)
MONOCYTES NFR BLD MANUAL: 4 % (ref 2–6)
MPC BLD CALC-MCNC: 11 FL (ref 9.4–12.4)
NEUTROPHILS # BLD AUTO: 14.63 K/UL (ref 1.8–7.7)
NEUTROPHILS NFR BLD AUTO: 85.7 % (ref 53–65)
NEUTS BAND NFR BLD MANUAL: 9 % (ref 1–5)
NEUTS SEG NFR BLD MANUAL: 81 % (ref 50–62)
NITRITE UR QL STRIP: NEGATIVE
NRBC # BLD AUTO: 0 X10E3/UL
NRBC, AUTO (.00): 0 %
PH UR STRIP: 7 PH UNITS
PLATELET # BLD AUTO: 137 K/UL (ref 150–400)
PLATELET MORPHOLOGY: ABNORMAL
POTASSIUM SERPL-SCNC: 3.6 MMOL/L (ref 3.5–5.1)
PROT SERPL-MCNC: 5.5 G/DL (ref 6.4–8.2)
PROT UR QL STRIP: 70
RBC # BLD AUTO: 3.56 M/UL (ref 4.6–6.2)
RBC # UR STRIP: ABNORMAL /UL
RBC #/AREA URNS HPF: >182 /HPF
SARS-COV-2 RDRP RESP QL NAA+PROBE: NEGATIVE
SODIUM SERPL-SCNC: 141 MMOL/L (ref 136–145)
SP GR UR STRIP: 1.02
UROBILINOGEN UR STRIP-ACNC: NORMAL MG/DL
WBC # BLD AUTO: 17.08 K/UL (ref 4.5–11)
WBC #/AREA URNS HPF: >182 /HPF
WBC CLUMPS, UA: ABNORMAL /HPF

## 2023-10-24 PROCEDURE — 99223 PR INITIAL HOSPITAL CARE,LEVL III: ICD-10-PCS | Mod: AI,,, | Performed by: INTERNAL MEDICINE

## 2023-10-24 PROCEDURE — 11000001 HC ACUTE MED/SURG PRIVATE ROOM

## 2023-10-24 PROCEDURE — 93010 ELECTROCARDIOGRAM REPORT: CPT | Mod: ,,, | Performed by: HOSPITALIST

## 2023-10-24 PROCEDURE — 87186 SC STD MICRODIL/AGAR DIL: CPT | Performed by: FAMILY MEDICINE

## 2023-10-24 PROCEDURE — 99285 EMERGENCY DEPT VISIT HI MDM: CPT | Mod: ,,, | Performed by: EMERGENCY MEDICINE

## 2023-10-24 PROCEDURE — 25000003 PHARM REV CODE 250: Performed by: FAMILY MEDICINE

## 2023-10-24 PROCEDURE — 87040 BLOOD CULTURE FOR BACTERIA: CPT | Performed by: FAMILY MEDICINE

## 2023-10-24 PROCEDURE — 81001 URINALYSIS AUTO W/SCOPE: CPT | Performed by: FAMILY MEDICINE

## 2023-10-24 PROCEDURE — 87077 CULTURE AEROBIC IDENTIFY: CPT | Performed by: FAMILY MEDICINE

## 2023-10-24 PROCEDURE — 82962 GLUCOSE BLOOD TEST: CPT

## 2023-10-24 PROCEDURE — 99285 EMERGENCY DEPT VISIT HI MDM: CPT | Mod: 25

## 2023-10-24 PROCEDURE — 87076 CULTURE ANAEROBE IDENT EACH: CPT | Mod: 90 | Performed by: EMERGENCY MEDICINE

## 2023-10-24 PROCEDURE — 85730 THROMBOPLASTIN TIME PARTIAL: CPT

## 2023-10-24 PROCEDURE — 96365 THER/PROPH/DIAG IV INF INIT: CPT

## 2023-10-24 PROCEDURE — 85025 COMPLETE CBC W/AUTO DIFF WBC: CPT | Performed by: FAMILY MEDICINE

## 2023-10-24 PROCEDURE — 99223 1ST HOSP IP/OBS HIGH 75: CPT | Mod: AI,,, | Performed by: INTERNAL MEDICINE

## 2023-10-24 PROCEDURE — 63600175 PHARM REV CODE 636 W HCPCS: Performed by: FAMILY MEDICINE

## 2023-10-24 PROCEDURE — 93005 ELECTROCARDIOGRAM TRACING: CPT

## 2023-10-24 PROCEDURE — 85610 PROTHROMBIN TIME: CPT

## 2023-10-24 PROCEDURE — 83036 HEMOGLOBIN GLYCOSYLATED A1C: CPT

## 2023-10-24 PROCEDURE — 93010 EKG 12-LEAD: ICD-10-PCS | Mod: ,,, | Performed by: HOSPITALIST

## 2023-10-24 PROCEDURE — 83605 ASSAY OF LACTIC ACID: CPT | Performed by: FAMILY MEDICINE

## 2023-10-24 PROCEDURE — 87635 SARS-COV-2 COVID-19 AMP PRB: CPT | Performed by: FAMILY MEDICINE

## 2023-10-24 PROCEDURE — 99285 PR EMERGENCY DEPT VISIT,LEVEL V: ICD-10-PCS | Mod: ,,, | Performed by: EMERGENCY MEDICINE

## 2023-10-24 PROCEDURE — 80053 COMPREHEN METABOLIC PANEL: CPT | Performed by: FAMILY MEDICINE

## 2023-10-24 PROCEDURE — 63600175 PHARM REV CODE 636 W HCPCS

## 2023-10-24 PROCEDURE — 87186 SC STD MICRODIL/AGAR DIL: CPT | Mod: 90

## 2023-10-24 RX ORDER — IBUPROFEN 200 MG
16 TABLET ORAL
Status: DISCONTINUED | OUTPATIENT
Start: 2023-10-24 | End: 2023-10-29 | Stop reason: HOSPADM

## 2023-10-24 RX ORDER — IBUPROFEN 200 MG
24 TABLET ORAL
Status: DISCONTINUED | OUTPATIENT
Start: 2023-10-24 | End: 2023-10-29 | Stop reason: HOSPADM

## 2023-10-24 RX ORDER — HYDRALAZINE HYDROCHLORIDE 20 MG/ML
10 INJECTION INTRAMUSCULAR; INTRAVENOUS EVERY 6 HOURS PRN
Status: DISCONTINUED | OUTPATIENT
Start: 2023-10-25 | End: 2023-10-29 | Stop reason: HOSPADM

## 2023-10-24 RX ORDER — ONDANSETRON 2 MG/ML
4 INJECTION INTRAMUSCULAR; INTRAVENOUS EVERY 8 HOURS PRN
Status: DISCONTINUED | OUTPATIENT
Start: 2023-10-24 | End: 2023-10-29 | Stop reason: HOSPADM

## 2023-10-24 RX ORDER — GLUCAGON 1 MG
1 KIT INJECTION
Status: DISCONTINUED | OUTPATIENT
Start: 2023-10-24 | End: 2023-10-24

## 2023-10-24 RX ORDER — SODIUM CHLORIDE 0.9 % (FLUSH) 0.9 %
10 SYRINGE (ML) INJECTION EVERY 12 HOURS PRN
Status: DISCONTINUED | OUTPATIENT
Start: 2023-10-24 | End: 2023-10-29 | Stop reason: HOSPADM

## 2023-10-24 RX ORDER — SODIUM CHLORIDE, SODIUM LACTATE, POTASSIUM CHLORIDE, CALCIUM CHLORIDE 600; 310; 30; 20 MG/100ML; MG/100ML; MG/100ML; MG/100ML
INJECTION, SOLUTION INTRAVENOUS ONCE
Status: COMPLETED | OUTPATIENT
Start: 2023-10-25 | End: 2023-10-25

## 2023-10-24 RX ORDER — HEPARIN SODIUM 5000 [USP'U]/ML
5000 INJECTION, SOLUTION INTRAVENOUS; SUBCUTANEOUS EVERY 12 HOURS
Status: DISCONTINUED | OUTPATIENT
Start: 2023-10-25 | End: 2023-10-25

## 2023-10-24 RX ORDER — INSULIN ASPART 100 [IU]/ML
0-10 INJECTION, SOLUTION INTRAVENOUS; SUBCUTANEOUS EVERY 6 HOURS PRN
Status: DISCONTINUED | OUTPATIENT
Start: 2023-10-24 | End: 2023-10-29 | Stop reason: HOSPADM

## 2023-10-24 RX ORDER — NALOXONE HCL 0.4 MG/ML
0.02 VIAL (ML) INJECTION
Status: DISCONTINUED | OUTPATIENT
Start: 2023-10-24 | End: 2023-10-29 | Stop reason: HOSPADM

## 2023-10-24 RX ORDER — GLUCAGON 1 MG
1 KIT INJECTION
Status: DISCONTINUED | OUTPATIENT
Start: 2023-10-24 | End: 2023-10-29 | Stop reason: HOSPADM

## 2023-10-24 RX ADMIN — INSULIN ASPART 3 UNITS: 100 INJECTION, SOLUTION INTRAVENOUS; SUBCUTANEOUS at 11:10

## 2023-10-24 RX ADMIN — PIPERACILLIN AND TAZOBACTAM 4.5 G: 4; .5 INJECTION, POWDER, FOR SOLUTION INTRAVENOUS at 06:10

## 2023-10-24 RX ADMIN — SODIUM CHLORIDE, POTASSIUM CHLORIDE, SODIUM LACTATE AND CALCIUM CHLORIDE 2178 ML: 600; 310; 30; 20 INJECTION, SOLUTION INTRAVENOUS at 06:10

## 2023-10-24 NOTE — ED TRIAGE NOTES
Presents to ED via EMS from [facility] in [place] for c/o AMS. Patient was seen in ED 10/22/23 and dx with UTI, staff states patient is not having any improvement in mental status.
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
## 2023-10-25 ENCOUNTER — ANESTHESIA EVENT (OUTPATIENT)
Dept: SURGERY | Facility: HOSPITAL | Age: 38
DRG: 853 | End: 2023-10-25
Payer: MEDICARE

## 2023-10-25 ENCOUNTER — ANESTHESIA (OUTPATIENT)
Dept: SURGERY | Facility: HOSPITAL | Age: 38
DRG: 853 | End: 2023-10-25
Payer: MEDICARE

## 2023-10-25 LAB
ALBUMIN SERPL BCP-MCNC: 1.5 G/DL (ref 3.5–5)
ANION GAP SERPL CALCULATED.3IONS-SCNC: 6 MMOL/L (ref 7–16)
APTT PPP: 48.3 SECONDS (ref 25.2–37.3)
BASOPHILS # BLD AUTO: 0.04 K/UL (ref 0–0.2)
BASOPHILS NFR BLD AUTO: 0.2 % (ref 0–1)
BUN SERPL-MCNC: 39 MG/DL (ref 7–18)
BUN/CREAT SERPL: 14 (ref 6–20)
CALCIUM SERPL-MCNC: 8.2 MG/DL (ref 8.5–10.1)
CHLORIDE SERPL-SCNC: 110 MMOL/L (ref 98–107)
CO2 SERPL-SCNC: 29 MMOL/L (ref 21–32)
CREAT SERPL-MCNC: 2.69 MG/DL (ref 0.7–1.3)
DIFFERENTIAL METHOD BLD: ABNORMAL
EGFR (NO RACE VARIABLE) (RUSH/TITUS): 30 ML/MIN/1.73M2
EOSINOPHIL # BLD AUTO: 0.03 K/UL (ref 0–0.5)
EOSINOPHIL NFR BLD AUTO: 0.2 % (ref 1–4)
ERYTHROCYTE [DISTWIDTH] IN BLOOD BY AUTOMATED COUNT: 14.6 % (ref 11.5–14.5)
GLUCOSE SERPL-MCNC: 118 MG/DL (ref 70–105)
GLUCOSE SERPL-MCNC: 132 MG/DL (ref 70–105)
GLUCOSE SERPL-MCNC: 189 MG/DL (ref 70–105)
GLUCOSE SERPL-MCNC: 208 MG/DL (ref 74–106)
GLUCOSE SERPL-MCNC: 254 MG/DL (ref 70–105)
HCT VFR BLD AUTO: 27.6 % (ref 40–54)
HGB BLD-MCNC: 9.6 G/DL (ref 13.5–18)
HIV 1+O+2 AB SERPL QL: NORMAL
HYPOCHROMIA BLD QL SMEAR: ABNORMAL
IMM GRANULOCYTES # BLD AUTO: 0.32 K/UL (ref 0–0.04)
IMM GRANULOCYTES NFR BLD: 1.7 % (ref 0–0.4)
INR BLD: 1.1
LYMPHOCYTES # BLD AUTO: 0.44 K/UL (ref 1–4.8)
LYMPHOCYTES NFR BLD AUTO: 2.3 % (ref 27–41)
LYMPHOCYTES NFR BLD MANUAL: 4 % (ref 27–41)
MAGNESIUM SERPL-MCNC: 2.3 MG/DL (ref 1.7–2.3)
MCH RBC QN AUTO: 28.1 PG (ref 27–31)
MCHC RBC AUTO-ENTMCNC: 34.8 G/DL (ref 32–36)
MCV RBC AUTO: 80.7 FL (ref 80–96)
MONOCYTES # BLD AUTO: 1.34 K/UL (ref 0–0.8)
MONOCYTES NFR BLD AUTO: 7 % (ref 2–6)
MONOCYTES NFR BLD MANUAL: 5 % (ref 2–6)
MPC BLD CALC-MCNC: 11.5 FL (ref 9.4–12.4)
NEUTROPHILS # BLD AUTO: 17.04 K/UL (ref 1.8–7.7)
NEUTROPHILS NFR BLD AUTO: 88.6 % (ref 53–65)
NEUTS BAND NFR BLD MANUAL: 8 % (ref 1–5)
NEUTS SEG NFR BLD MANUAL: 83 % (ref 50–62)
NRBC # BLD AUTO: 0 X10E3/UL
NRBC, AUTO (.00): 0 %
PHOSPHATE SERPL-MCNC: 1.2 MG/DL (ref 2.5–4.5)
PLATELET # BLD AUTO: 139 K/UL (ref 150–400)
PLATELET MORPHOLOGY: ABNORMAL
POTASSIUM SERPL-SCNC: 3.6 MMOL/L (ref 3.5–5.1)
PROTHROMBIN TIME: 14.1 SECONDS (ref 11.7–14.7)
RBC # BLD AUTO: 3.42 M/UL (ref 4.6–6.2)
SODIUM SERPL-SCNC: 141 MMOL/L (ref 136–145)
SYPHILIS AB INTERPRETATION: NORMAL
TSH SERPL DL<=0.005 MIU/L-ACNC: 2.99 UIU/ML (ref 0.36–3.74)
VIT B12 SERPL-MCNC: 2332 PG/ML (ref 193–986)
WBC # BLD AUTO: 19.21 K/UL (ref 4.5–11)

## 2023-10-25 PROCEDURE — 87076 CULTURE ANAEROBE IDENT EACH: CPT | Performed by: SURGERY

## 2023-10-25 PROCEDURE — 63600175 PHARM REV CODE 636 W HCPCS

## 2023-10-25 PROCEDURE — 36000706: Performed by: SURGERY

## 2023-10-25 PROCEDURE — 99232 PR SUBSEQUENT HOSPITAL CARE,LEVL II: ICD-10-PCS | Mod: ,,, | Performed by: INTERNAL MEDICINE

## 2023-10-25 PROCEDURE — D9220A PRA ANESTHESIA: ICD-10-PCS | Mod: ANES,,, | Performed by: ANESTHESIOLOGY

## 2023-10-25 PROCEDURE — 71000033 HC RECOVERY, INTIAL HOUR: Performed by: SURGERY

## 2023-10-25 PROCEDURE — 11043 PR DEBRIDEMENT, SKIN, SUB-Q TISSUE,MUSCLE,=<20 SQ CM: ICD-10-PCS | Mod: ,,, | Performed by: SURGERY

## 2023-10-25 PROCEDURE — 84443 ASSAY THYROID STIM HORMONE: CPT

## 2023-10-25 PROCEDURE — 37000009 HC ANESTHESIA EA ADD 15 MINS: Performed by: SURGERY

## 2023-10-25 PROCEDURE — 87075 CULTR BACTERIA EXCEPT BLOOD: CPT | Performed by: SURGERY

## 2023-10-25 PROCEDURE — 36000707: Performed by: SURGERY

## 2023-10-25 PROCEDURE — 63600175 PHARM REV CODE 636 W HCPCS: Performed by: NURSE ANESTHETIST, CERTIFIED REGISTERED

## 2023-10-25 PROCEDURE — 25000003 PHARM REV CODE 250: Performed by: NURSE ANESTHETIST, CERTIFIED REGISTERED

## 2023-10-25 PROCEDURE — 37000008 HC ANESTHESIA 1ST 15 MINUTES: Performed by: SURGERY

## 2023-10-25 PROCEDURE — 87070 CULTURE OTHR SPECIMN AEROBIC: CPT | Performed by: SURGERY

## 2023-10-25 PROCEDURE — 90935 HEMODIALYSIS ONE EVALUATION: CPT

## 2023-10-25 PROCEDURE — D9220A PRA ANESTHESIA: ICD-10-PCS | Mod: CRNA,,, | Performed by: NURSE ANESTHETIST, CERTIFIED REGISTERED

## 2023-10-25 PROCEDURE — 25000003 PHARM REV CODE 250

## 2023-10-25 PROCEDURE — D9220A PRA ANESTHESIA: Mod: ANES,,, | Performed by: ANESTHESIOLOGY

## 2023-10-25 PROCEDURE — 82607 VITAMIN B-12: CPT

## 2023-10-25 PROCEDURE — 83735 ASSAY OF MAGNESIUM: CPT

## 2023-10-25 PROCEDURE — 11046 PR DEB MUSC/FASCIA ADD-ON: ICD-10-PCS | Mod: ,,, | Performed by: SURGERY

## 2023-10-25 PROCEDURE — 85025 COMPLETE CBC W/AUTO DIFF WBC: CPT

## 2023-10-25 PROCEDURE — 88304 TISSUE EXAM BY PATHOLOGIST: CPT | Mod: 26,,, | Performed by: PATHOLOGY

## 2023-10-25 PROCEDURE — 11046 DBRDMT MUSC&/FSCA EA ADDL: CPT | Mod: ,,, | Performed by: SURGERY

## 2023-10-25 PROCEDURE — 27000716 HC OXISENSOR PROBE, ANY SIZE: Performed by: ANESTHESIOLOGY

## 2023-10-25 PROCEDURE — 27000655: Performed by: ANESTHESIOLOGY

## 2023-10-25 PROCEDURE — 86780 TREPONEMA PALLIDUM: CPT

## 2023-10-25 PROCEDURE — 80069 RENAL FUNCTION PANEL: CPT

## 2023-10-25 PROCEDURE — 27000284 HC CANNULA NASAL: Performed by: ANESTHESIOLOGY

## 2023-10-25 PROCEDURE — 99232 SBSQ HOSP IP/OBS MODERATE 35: CPT | Mod: ,,, | Performed by: INTERNAL MEDICINE

## 2023-10-25 PROCEDURE — 88304 SURGICAL PATHOLOGY: ICD-10-PCS | Mod: 26,,, | Performed by: PATHOLOGY

## 2023-10-25 PROCEDURE — 82962 GLUCOSE BLOOD TEST: CPT

## 2023-10-25 PROCEDURE — 87389 HIV-1 AG W/HIV-1&-2 AB AG IA: CPT

## 2023-10-25 PROCEDURE — 25000003 PHARM REV CODE 250: Performed by: INTERNAL MEDICINE

## 2023-10-25 PROCEDURE — 11043 DBRDMT MUSC&/FSCA 1ST 20/<: CPT | Mod: ,,, | Performed by: SURGERY

## 2023-10-25 PROCEDURE — 27000177 HC AIRWAY, LARYNGEAL MASK: Performed by: ANESTHESIOLOGY

## 2023-10-25 PROCEDURE — D9220A PRA ANESTHESIA: Mod: CRNA,,, | Performed by: NURSE ANESTHETIST, CERTIFIED REGISTERED

## 2023-10-25 PROCEDURE — 11000001 HC ACUTE MED/SURG PRIVATE ROOM

## 2023-10-25 PROCEDURE — 88304 TISSUE EXAM BY PATHOLOGIST: CPT | Mod: TC,SUR | Performed by: SURGERY

## 2023-10-25 RX ORDER — SODIUM CHLORIDE 9 MG/ML
INJECTION, SOLUTION INTRAVENOUS CONTINUOUS
Status: DISCONTINUED | OUTPATIENT
Start: 2023-10-25 | End: 2023-10-25

## 2023-10-25 RX ORDER — PROPOFOL 10 MG/ML
INJECTION, EMULSION INTRAVENOUS
Status: DISCONTINUED | OUTPATIENT
Start: 2023-10-25 | End: 2023-10-25

## 2023-10-25 RX ORDER — ONDANSETRON 2 MG/ML
INJECTION INTRAMUSCULAR; INTRAVENOUS
Status: DISCONTINUED | OUTPATIENT
Start: 2023-10-25 | End: 2023-10-25

## 2023-10-25 RX ORDER — HYDROMORPHONE HYDROCHLORIDE 2 MG/ML
0.5 INJECTION, SOLUTION INTRAMUSCULAR; INTRAVENOUS; SUBCUTANEOUS EVERY 5 MIN PRN
Status: DISCONTINUED | OUTPATIENT
Start: 2023-10-25 | End: 2023-10-25 | Stop reason: HOSPADM

## 2023-10-25 RX ORDER — MUPIROCIN 20 MG/G
OINTMENT TOPICAL 2 TIMES DAILY
Status: DISCONTINUED | OUTPATIENT
Start: 2023-10-25 | End: 2023-10-29 | Stop reason: HOSPADM

## 2023-10-25 RX ORDER — PHENYLEPHRINE HYDROCHLORIDE 10 MG/ML
INJECTION INTRAVENOUS
Status: DISCONTINUED | OUTPATIENT
Start: 2023-10-25 | End: 2023-10-25

## 2023-10-25 RX ORDER — ONDANSETRON 2 MG/ML
4 INJECTION INTRAMUSCULAR; INTRAVENOUS DAILY PRN
Status: DISCONTINUED | OUTPATIENT
Start: 2023-10-25 | End: 2023-10-25 | Stop reason: HOSPADM

## 2023-10-25 RX ORDER — ENOXAPARIN SODIUM 100 MG/ML
30 INJECTION SUBCUTANEOUS EVERY 24 HOURS
Status: DISCONTINUED | OUTPATIENT
Start: 2023-10-26 | End: 2023-10-28

## 2023-10-25 RX ORDER — MORPHINE SULFATE 10 MG/ML
4 INJECTION INTRAMUSCULAR; INTRAVENOUS; SUBCUTANEOUS EVERY 5 MIN PRN
Status: DISCONTINUED | OUTPATIENT
Start: 2023-10-25 | End: 2023-10-25 | Stop reason: HOSPADM

## 2023-10-25 RX ORDER — MEPERIDINE HYDROCHLORIDE 25 MG/ML
25 INJECTION INTRAMUSCULAR; INTRAVENOUS; SUBCUTANEOUS EVERY 10 MIN PRN
Status: DISCONTINUED | OUTPATIENT
Start: 2023-10-25 | End: 2023-10-25 | Stop reason: HOSPADM

## 2023-10-25 RX ORDER — SODIUM CHLORIDE 9 MG/ML
INJECTION, SOLUTION INTRAVENOUS
Status: DISCONTINUED | OUTPATIENT
Start: 2023-10-25 | End: 2023-10-29 | Stop reason: HOSPADM

## 2023-10-25 RX ADMIN — MUPIROCIN: 20 OINTMENT TOPICAL at 09:10

## 2023-10-25 RX ADMIN — SODIUM CHLORIDE: 9 INJECTION, SOLUTION INTRAVENOUS at 05:10

## 2023-10-25 RX ADMIN — FENTANYL CITRATE 25 MCG: 50 INJECTION INTRAMUSCULAR; INTRAVENOUS at 05:10

## 2023-10-25 RX ADMIN — SODIUM CHLORIDE: 9 INJECTION, SOLUTION INTRAVENOUS at 12:10

## 2023-10-25 RX ADMIN — PHENYLEPHRINE HYDROCHLORIDE 200 MCG: 10 INJECTION INTRAVENOUS at 06:10

## 2023-10-25 RX ADMIN — PHENYLEPHRINE HYDROCHLORIDE 200 MCG: 10 INJECTION INTRAVENOUS at 05:10

## 2023-10-25 RX ADMIN — MEROPENEM 1 G: 1 INJECTION, POWDER, FOR SOLUTION INTRAVENOUS at 09:10

## 2023-10-25 RX ADMIN — PROPOFOL 50 MG: 10 INJECTION, EMULSION INTRAVENOUS at 05:10

## 2023-10-25 RX ADMIN — SODIUM CHLORIDE, POTASSIUM CHLORIDE, SODIUM LACTATE AND CALCIUM CHLORIDE: 600; 310; 30; 20 INJECTION, SOLUTION INTRAVENOUS at 05:10

## 2023-10-25 RX ADMIN — PHENYLEPHRINE HYDROCHLORIDE 150 MCG: 10 INJECTION INTRAVENOUS at 05:10

## 2023-10-25 RX ADMIN — ONDANSETRON 4 MG: 2 INJECTION INTRAMUSCULAR; INTRAVENOUS at 05:10
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
## 2023-10-25 NOTE — PLAN OF CARE
Problem: Adult Inpatient Plan of Care  Goal: Plan of Care Review  Outcome: Ongoing, Progressing  Flowsheets (Taken 10/25/2023 0328)  Plan of Care Reviewed With: patient  Goal: Patient-Specific Goal (Individualized)  Outcome: Ongoing, Progressing  Goal: Absence of Hospital-Acquired Illness or Injury  Outcome: Ongoing, Progressing  Goal: Optimal Comfort and Wellbeing  Outcome: Ongoing, Progressing  Goal: Readiness for Transition of Care  Outcome: Ongoing, Progressing

## 2023-10-25 NOTE — OP NOTE
[facility] - Periop Services     Operative Note    SUMMARY     Date of Procedure: 10/25/2023     Procedure: Procedure(s) (LRB):  DEBRIDEMENT, BUTTOCK (Bilateral)     Surgeon(s) and Role:     * Tanner Hamm MD - Primary    Assisting Surgeon: None    Pre-Operative Diagnosis: Wound infection [T14.8XXA, L08.9]    Post-Operative Diagnosis: Post-Op Diagnosis Codes:     * Wound infection [T14.8XXA, L08.9]    Anesthesia: General    Technical Procedures Used:  Patient was brought to the operating room placed supine position.  After administration of general anesthesia, he was placed  in left lateral decubitus position.  Sterile prep and drape was performed.  Excision of necrotic eschar, soft tissue and fascia was then performed with a 15 blade.  Continued excision of tissues was performing excisional debridement using scissors, forceps and a green curette.  The wound was then extensively irrigated, and after further evaluation for necrotic tissue further debridement performed as indicated.  Debridement was carried down to the sacral periosteum, but the bone was not exposed and no bony debridement performed.  Following irrigation, which for hemostasis with cautery and packed the wound open with moistened gauze.  Patient was then awakened from anesthesia in stable condition.    Description of the Findings of the Procedure:  9cm x 12 cmx 3cm ulcer, stage IV, debrided to periosteum of sacrum (muscle/fascia level).     Assistant(s): DESTIN Ramsey student      Complications: No    Estimated Blood Loss (EBL): 25cc           Implants: * No implants in log *    Specimens:  Debrided tissue  Specimen (24h ago, onward)       Start     Ordered    10/25/23 1757  Surgical Pathology  RELEASE UPON ORDERING         10/25/23 1757                     Condition: Good      Complications:  None

## 2023-10-25 NOTE — ASSESSMENT & PLAN NOTE
- Recent culture grew E coli with broad sensivity  - Received Zosyn in ED  - Will continue with Merrem for possible ESBL coverage   - Pending culture results

## 2023-10-25 NOTE — ASSESSMENT & PLAN NOTE
- Etiology is unknown but may be due infection  - Continue antibiotic for UTI  - Pending urine and blood culture  - Continue gentle IV hydration
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
## 2023-10-25 NOTE — ED NOTES
NOTIFIED DR ARZOLA'S NURSE OF CONSULT. STATES SHE WOULD LET  KNOW. AWAITING CALLBACK FROM DR ARZOLA

## 2023-10-25 NOTE — PROGRESS NOTES
"Pharmacokinetic Initial Assessment: IV Vancomycin    Assessment/Plan:    Vanc 1500 mg IV x 1 today  Desired empiric serum trough concentration is </= 15  Draw vancomycin random level on 10/30 at 0400.  Pharmacy will continue to follow and monitor vancomycin.      Please contact pharmacy at extension [phone] with any questions regarding this assessment.     Patient brief summary:  Lee Escobar is a 38 y.o. male initiated on antimicrobial therapy with IV Vancomycin for treatment of suspected skin & soft tissue infection    Drug Allergies:   Review of patient's allergies indicates:  No Known Allergies    Actual Body Weight:   72.6 kg    Renal Function:   Estimated Creatinine Clearance: 32.7 mL/min (A) (based on SCr of 2.69 mg/dL (H)).,     Dialysis Method (if applicable):  intermittent HD    CBC (last 72 hours):  Recent Labs   Lab Result Units 10/24/23  1825 10/25/23  0513   WBC K/uL 17.08* 19.21*   Hemoglobin g/dL 9.9* 9.6*   Hemoglobin A1C % 4.7  --    Hematocrit % 28.9* 27.6*   Platelet Count K/uL 137* 139*   Lymphocytes % % 2.3* 2.3*   Lymphocytes, Man % % 6* 4*   Monocytes % % 8.0* 7.0*   Monocytes, Man % % 4 5   Eosinophils % % 0.1* 0.2*   Basophils % % 0.2 0.2   Diff Type  Manual Manual       Metabolic Panel (last 72 hours):  Recent Labs   Lab Result Units 10/24/23  1825 10/24/23  1850 10/25/23  0513   Sodium mmol/L 141  --  141   Potassium mmol/L 3.6  --  3.6   Chloride mmol/L 107  --  110*   CO2 mmol/L 28  --  29   Glucose mg/dL 291*  --  208*   Glucose, UA mg/dL  --  Normal  --    BUN mg/dL 36*  --  39*   Creatinine mg/dL 2.80*  --  2.69*   Albumin g/dL 1.4*  --  1.5*   Bilirubin, Total mg/dL 0.8  --   --    Alk Phos U/L 147*  --   --    AST U/L 3*  --   --    ALT U/L 4*  --   --    Magnesium mg/dL  --   --  2.3   Phosphorus mg/dL  --   --  1.2*       Drug levels (last 3 results):  No results for input(s): "VANCOMYCINRA", "VANCORANDOM", "VANCOMYCINPE", "VANCOPEAK", "VANCOMYCINTR", "VANCOTROUGH" in the last 72 " hours.    Microbiologic Results:  Microbiology Results (last 7 days)       Procedure Component Value Units Date/Time    Urine culture [[id number]]  (Abnormal) Collected: 10/24/23 1850    Order Status: Completed Specimen: Urine Updated: 10/25/23 1226     Culture, Urine >100,000 Gram-negative Bacilli     Comment: Identification and Susceptibility to Follow       Blood culture x two cultures. Draw prior to antibiotics. [[id number]] Collected: 10/24/23 1831    Order Status: Sent Specimen: Blood Updated: 10/24/23 1838    Blood culture x two cultures. Draw prior to antibiotics. [[id number]] Collected: 10/24/23 1825    Order Status: Sent Specimen: Blood Updated: 10/24/23 1837
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
## 2023-10-25 NOTE — OR NURSING
1819 Rec'd pt to PACU drowsy but arousable to verbal stimuli. No signs of distress noted, respirations even and unlabored. VSS. Dressing to buttocks C/D/I. No needs at this time. Will continue to monitor.     1851 Out of PACU. VSS. No signs of bleeding/distress noted. Notified family via messaging system of transfer to room 461.     1900 Pt to room 461 drowsy but arousable to verbal stimuli. No signs of distress noted, respirations even and unlabored. No visitors at bedside. Pt belongings at bedside. Bedside report given to PHILIP Woodruff RN. Buttocks dressing C/D/I. No needs. /68, P 80, R 12, O2 96% RA, T 98.1 oral.
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
## 2023-10-25 NOTE — TRANSFER OF CARE
"Anesthesia Transfer of Care Note    Patient: Lee Escobar    Procedure(s) Performed: Procedure(s) (LRB):  DEBRIDEMENT, BUTTOCK (Bilateral)    Patient location: PACU    Anesthesia Type: general    Transport from OR: Transported from OR on 2-3 L/min O2 by NC with adequate spontaneous ventilation    Post pain: adequate analgesia    Post assessment: no apparent anesthetic complications and tolerated procedure well    Post vital signs: stable    Level of consciousness: alert and responds to stimulation    Nausea/Vomiting: no nausea/vomiting    Complications: none    Transfer of care protocol was followed      Last vitals:   Visit Vitals  BP (!) 140/78   Pulse 87   Temp 36.7 °C (98 °F) (Oral)   Resp 15   Ht 5' 5" (1.651 m)   Wt 72.6 kg (160 lb 0.9 oz)   SpO2 100%   BMI 26.63 kg/m²     "
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
## 2023-10-25 NOTE — NURSING
Patient hd tx complete. No complications. 1000 ml fluid net removed. Patient received to surgery at this time.

## 2023-10-25 NOTE — ASSESSMENT & PLAN NOTE
- Patient with ESRD on dialysis MWF  - Avoid nephrotoxic drugs  - Renally dose all applicable medications  - Strict input and output  - Daily renal panel to monitor renal function  - Nephrology consulted for dialysis

## 2023-10-25 NOTE — DIALYSIS ROUNDING
Mr. Escobar is seen at the start of his dialysis. He is weak and lethargic but does answer questions. We'll not need to remove fluid today.
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
## 2023-10-25 NOTE — INTERVAL H&P NOTE
The patient has been examined and the H&P has been reviewed:    I concur with the findings and no changes have occurred since H&P was written.    Surgery risks, benefits and alternative options discussed and understood by patient/family.          Active Hospital Problems    Diagnosis  POA    *Encephalopathy, metabolic [G93.41]  Yes    UTI (urinary tract infection) [N39.0]  Yes    Paraplegia following spinal cord injury [G82.20]  Not Applicable     Chronic    HTN (hypertension) [I10]  Yes     Chronic    ESRD (end stage renal disease) [N18.6]  Yes    Sacral wound [T14.8XXA, L08.9]  Yes     Chronic     Wound care follow-up  Antibiotics        Type 2 diabetes mellitus with hyperglycemia [E11.65]  Yes    Anemia in ESRD (end-stage renal disease) [N18.6, D63.1]  Yes      Resolved Hospital Problems   No resolved problems to display.

## 2023-10-25 NOTE — ED NOTES
REMAINS IN DIALYSIS. ATTEMPTED TO CALL REPORT TO [facility]N AND THERE IS NO NURSES TO TAKE REPORT. INFORMED TECH THAT RN CAN CALL WHEN PATIENT ARRIVES TO UNIT AFTER SURGERY

## 2023-10-25 NOTE — ASSESSMENT & PLAN NOTE
Patient's FSGs are controlled on current medication regimen.  Last A1c reviewed-   Lab Results   Component Value Date    HGBA1C 5.8 06/01/2023     Current correctional scale  Medium  Maintain anti-hyperglycemic dose as follows-   Antihyperglycemics (From admission, onward)    Start     Stop Route Frequency Ordered    10/24/23 2333  insulin aspart U-100 injection 0-10 Units         -- SubQ Every 6 hours PRN 10/24/23 2234        Hold Oral hypoglycemics while patient is in the hospital.
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
## 2023-10-25 NOTE — ANESTHESIA POSTPROCEDURE EVALUATION
Anesthesia Post Evaluation    Patient: Lee Escobar    Procedure(s) Performed: Procedure(s) (LRB):  DEBRIDEMENT, BUTTOCK (Bilateral)    Final Anesthesia Type: general      Patient location during evaluation: PACU  Patient participation: Yes- Able to Participate  Level of consciousness: awake and sedated  Post-procedure vital signs: reviewed and stable  Pain management: adequate  Airway patency: patent    PONV status at discharge: No PONV  Anesthetic complications: no      Cardiovascular status: blood pressure returned to baseline  Respiratory status: unassisted  Hydration status: euvolemic  Follow-up not needed.          Vitals Value Taken Time   /78 10/25/23 1821   Temp 98 10/25/23 1823   Pulse 87 10/25/23 1823   Resp 15 10/25/23 1823   SpO2 100 % 10/25/23 1823   Vitals shown include unvalidated device data.      No case tracking events are documented in the log.      Pain/Niki Score: Pain Rating Prior to Med Admin: 0 (10/24/2023  7:15 PM)
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
## 2023-10-25 NOTE — PLAN OF CARE
[facility] - Emergency Department  Initial Discharge Assessment       Primary Care Provider: Maria Elena Beck II, MD    Admission Diagnosis: Altered mental status [R41.82]    Admission Date: 10/24/2023  Expected Discharge Date:     Transition of Care Barriers: None    Payor: MEDICARE / Plan: MEDICARE PART A & B / Product Type: Government /     Extended Emergency Contact Information  Primary Emergency Contact: Michael Escobar  Mobile Phone: [phone]  Relation: Father  Preferred language: English   needed? No  Secondary Emergency Contact: Samantha Escobar  Mobile Phone: [phone]  Relation: Sister  Preferred language: English   needed? No    Discharge Plan A: Return to nursing home  Discharge Plan B: Return to Nursing Home      [facility] [place] - [address]  [address]  [address]  [place] [address]  Phone: [phone] Fax: [phone]      Initial Assessment (most recent)       Adult Discharge Assessment - 10/25/23 1336          Discharge Assessment    Assessment Type Discharge Planning Assessment     Confirmed/corrected address, phone number and insurance Yes     Confirmed Demographics Correct on Facesheet     Source of Information family     If unable to respond/provide information was family/caregiver contacted? Yes     Contact Name/Number samantha [phone]     Communicated ASHTYN with patient/caregiver Date not available/Unable to determine     People in Home facility resident     [facility]ility [facility] From: [facility]     Do you expect to return to your current living situation? Yes     Do you have help at home or someone to help you manage your care at home? Yes     Who are your caregiver(s) and their phone number(s)? facility     Prior to hospitilization cognitive status: Unable to Assess     Current cognitive status: Unable to Assess     Equipment Currently Used at Home none     Readmission within 30 days? No     Patient  currently being followed by outpatient case management? No     Do you currently have service(s) that help you manage your care at home? No     Do you take prescription medications? Yes     Do you have prescription coverage? Yes     Do you have any problems affording any of your prescribed medications? No     Is the patient taking medications as prescribed? yes     How do you get to doctors appointments? health plan transportation;family or friend will provide     Are you on dialysis? No     Do you take coumadin? No     DME Needed Upon Discharge  none     Discharge Plan discussed with: Sibling     Name(s) and Number(s) Samantha [phone]     Transition of Care Barriers None     Discharge Plan A Return to nursing home     Discharge Plan B Return to Nursing Home                      Spoke with sister now, pt will return to [facility] [place], CMS choice was given. Update sent to facility. Packet started. Fathers new number [phone]. Will follow AL needs as arise.

## 2023-10-25 NOTE — ANESTHESIA PROCEDURE NOTES
Intubation    Date/Time: 10/25/2023 5:11 PM    Performed by: Cosme Moore Jr., CRNA  Authorized by: Gerry Pathak MD    Intubation:     Induction:  Inhalational - mask    Intubated:  Postinduction    Mask Ventilation:  Easy with oral airway    Attempts:  1    Attempted By:  CRNA    Method of Intubation:  Direct    Difficult Airway Encountered?: No      Complications:  None    Airway Device:  Supraglottic airway/LMA    Airway Device Size:  4.0    Style/Cuff Inflation:  Uncuffed    Secured at:  The lips    Placement Verified By:  Capnometry    Complicating Factors:  None    Findings Post-Intubation:  Atraumatic/condition of teeth unchanged

## 2023-10-25 NOTE — PLAN OF CARE
1000  Attempt to speak with pt in room, his speech is unclear, he did answer that I can call family. Will follow.   1315  Attempt to call father, number is disconnected, left message for sister Samantha.

## 2023-10-25 NOTE — HPI
38-year-old male past medical history end-stage renal disease on HD, paraplegia, diabetes who was seen in consult today for nonhealing sacral wound.  Patient is a resident of [facility] who was admitted from the emergency room for altered mental status.  Patient is nonverbal and unable to provide any medical history.  Patient has sacral wound and wound on left buttock.     In the ED, the patient was afebrile and vital sign stable. CT head was negative for acute findings. Ensuring work was significant for UA which suggest UTI and leukocytosis with left shift band. Other findings were chronic microcytic anemia and CKD with CR of 2.8 and hypoalbuminemia of 1.4. Plan to take patient to the OR for wound debridement today.

## 2023-10-26 LAB
ALBUMIN SERPL BCP-MCNC: 1.5 G/DL (ref 3.5–5)
ALBUMIN/GLOB SERPL: 0.3 {RATIO}
ALP SERPL-CCNC: 143 U/L (ref 45–115)
ALT SERPL W P-5'-P-CCNC: 8 U/L (ref 16–61)
ANION GAP SERPL CALCULATED.3IONS-SCNC: 15 MMOL/L (ref 7–16)
AORTIC ROOT ANNULUS: 2.45 CM
AORTIC VALVE CUSP SEPERATION: 1.89 CM
AST SERPL W P-5'-P-CCNC: 5 U/L (ref 15–37)
BASOPHILS # BLD AUTO: 0.02 K/UL (ref 0–0.2)
BASOPHILS NFR BLD AUTO: 0.1 % (ref 0–1)
BILIRUB SERPL-MCNC: 0.9 MG/DL (ref ?–1.2)
BSA FOR ECHO PROCEDURE: 1.82 M2
BUN SERPL-MCNC: 24 MG/DL (ref 7–18)
BUN/CREAT SERPL: 12 (ref 6–20)
CALCIUM SERPL-MCNC: 8.5 MG/DL (ref 8.5–10.1)
CHLORIDE SERPL-SCNC: 107 MMOL/L (ref 98–107)
CO2 SERPL-SCNC: 20 MMOL/L (ref 21–32)
CREAT SERPL-MCNC: 2.05 MG/DL (ref 0.7–1.3)
CV ECHO LV RWT: 0.68 CM
DIFFERENTIAL METHOD BLD: ABNORMAL
DOP CALC LVOT AREA: 3 CM2
DOP CALC LVOT DIAMETER: 1.96 CM
DOP CALC LVOT PEAK VEL: 1.13 M/S
DOP CALC LVOT STROKE VOLUME: 69.96 CM3
DOP CALCLVOT PEAK VEL VTI: 23.2 CM
E WAVE DECELERATION TIME: 144.3 MSEC
E/A RATIO: 1.05
E/E' RATIO: 10.89 M/S
ECHO LV POSTERIOR WALL: 1.5 CM (ref 0.6–1.1)
EGFR (NO RACE VARIABLE) (RUSH/TITUS): 42 ML/MIN/1.73M2
EOSINOPHIL # BLD AUTO: 0.06 K/UL (ref 0–0.5)
EOSINOPHIL NFR BLD AUTO: 0.4 % (ref 1–4)
ERYTHROCYTE [DISTWIDTH] IN BLOOD BY AUTOMATED COUNT: 15.1 % (ref 11.5–14.5)
FRACTIONAL SHORTENING: 34 % (ref 28–44)
GLOBAL LONGITUIDAL STRAIN: -16.4 %
GLOBULIN SER-MCNC: 4.3 G/DL (ref 2–4)
GLUCOSE SERPL-MCNC: 165 MG/DL (ref 70–105)
GLUCOSE SERPL-MCNC: 200 MG/DL (ref 74–106)
GLUCOSE SERPL-MCNC: 260 MG/DL (ref 70–105)
GLUCOSE SERPL-MCNC: 276 MG/DL (ref 70–105)
HCT VFR BLD AUTO: 27.7 % (ref 40–54)
HGB BLD-MCNC: 9.5 G/DL (ref 13.5–18)
IMM GRANULOCYTES # BLD AUTO: 0.13 K/UL (ref 0–0.04)
IMM GRANULOCYTES NFR BLD: 0.8 % (ref 0–0.4)
INTERVENTRICULAR SEPTUM: 1.4 CM (ref 0.6–1.1)
IVC DIAMETER: 0.98 CM
LA MAJOR: 4.13 CM
LEFT ATRIUM SIZE: 3.87 CM
LEFT ATRIUM VOLUME INDEX MOD: 24.3 ML/M2
LEFT ATRIUM VOLUME MOD: 43.7 CM3
LEFT INTERNAL DIMENSION IN SYSTOLE: 2.9 CM (ref 2.1–4)
LEFT VENTRICLE DIASTOLIC VOLUME INDEX: 49.43 ML/M2
LEFT VENTRICLE DIASTOLIC VOLUME: 88.98 ML
LEFT VENTRICLE MASS INDEX: 141 G/M2
LEFT VENTRICLE SYSTOLIC VOLUME INDEX: 15.7 ML/M2
LEFT VENTRICLE SYSTOLIC VOLUME: 28.17 ML
LEFT VENTRICULAR INTERNAL DIMENSION IN DIASTOLE: 4.4 CM (ref 3.5–6)
LEFT VENTRICULAR MASS: 253.39 G
LV LATERAL E/E' RATIO: 14 M/S
LV SEPTAL E/E' RATIO: 8.91 M/S
LVOT MG: 3.19 MMHG
LVOT MV: 0.85 CM/S
LYMPHOCYTES # BLD AUTO: 0.43 K/UL (ref 1–4.8)
LYMPHOCYTES NFR BLD AUTO: 2.6 % (ref 27–41)
MCH RBC QN AUTO: 27.5 PG (ref 27–31)
MCHC RBC AUTO-ENTMCNC: 34.3 G/DL (ref 32–36)
MCV RBC AUTO: 80.3 FL (ref 80–96)
MONOCYTES # BLD AUTO: 1.08 K/UL (ref 0–0.8)
MONOCYTES NFR BLD AUTO: 6.5 % (ref 2–6)
MPC BLD CALC-MCNC: 10.7 FL (ref 9.4–12.4)
MV PEAK A VEL: 0.93 M/S
MV PEAK E VEL: 0.98 M/S
MV STENOSIS PRESSURE HALF TIME: 41.85 MS
MV VALVE AREA P 1/2 METHOD: 5.26 CM2
NEUTROPHILS # BLD AUTO: 14.78 K/UL (ref 1.8–7.7)
NEUTROPHILS NFR BLD AUTO: 89.6 % (ref 53–65)
NRBC # BLD AUTO: 0 X10E3/UL
NRBC, AUTO (.00): 0 %
OHS LV EJECTION FRACTION SIMPSONS BIPLANE MOD: 55 %
PISA TR MAX VEL: 3.06 M/S
PLATELET # BLD AUTO: 167 K/UL (ref 150–400)
POTASSIUM SERPL-SCNC: 3.9 MMOL/L (ref 3.5–5.1)
PROT SERPL-MCNC: 5.8 G/DL (ref 6.4–8.2)
PV PEAK GRADIENT: 8 MMHG
PV PEAK VELOCITY: 1.4 M/S
RA MAJOR: 4.07 CM
RA PRESSURE ESTIMATED: 3 MMHG
RBC # BLD AUTO: 3.45 M/UL (ref 4.6–6.2)
RIGHT VENTRICULAR END-DIASTOLIC DIMENSION: 3.52 CM
RV TB RVSP: 6 MMHG
SODIUM SERPL-SCNC: 138 MMOL/L (ref 136–145)
TDI LATERAL: 0.07 M/S
TDI SEPTAL: 0.11 M/S
TDI: 0.09 M/S
TR MAX PG: 37 MMHG
TRICUSPID ANNULAR PLANE SYSTOLIC EXCURSION: 2.35 CM
TV REST PULMONARY ARTERY PRESSURE: 40 MMHG
UA COMPLETE W REFLEX CULTURE PNL UR: ABNORMAL
WBC # BLD AUTO: 16.5 K/UL (ref 4.5–11)
Z-SCORE OF LEFT VENTRICULAR DIMENSION IN END DIASTOLE: -1.23
Z-SCORE OF LEFT VENTRICULAR DIMENSION IN END SYSTOLE: -0.47

## 2023-10-26 PROCEDURE — 80053 COMPREHEN METABOLIC PANEL: CPT | Performed by: INTERNAL MEDICINE

## 2023-10-26 PROCEDURE — 63600175 PHARM REV CODE 636 W HCPCS

## 2023-10-26 PROCEDURE — 11000001 HC ACUTE MED/SURG PRIVATE ROOM

## 2023-10-26 PROCEDURE — 63600175 PHARM REV CODE 636 W HCPCS: Performed by: INTERNAL MEDICINE

## 2023-10-26 PROCEDURE — 85025 COMPLETE CBC W/AUTO DIFF WBC: CPT | Performed by: INTERNAL MEDICINE

## 2023-10-26 PROCEDURE — 25000003 PHARM REV CODE 250: Performed by: INTERNAL MEDICINE

## 2023-10-26 PROCEDURE — 92610 EVALUATE SWALLOWING FUNCTION: CPT

## 2023-10-26 PROCEDURE — 25000003 PHARM REV CODE 250: Performed by: SURGERY

## 2023-10-26 PROCEDURE — 99232 SBSQ HOSP IP/OBS MODERATE 35: CPT | Mod: ,,, | Performed by: INTERNAL MEDICINE

## 2023-10-26 PROCEDURE — 82962 GLUCOSE BLOOD TEST: CPT

## 2023-10-26 PROCEDURE — 63600175 PHARM REV CODE 636 W HCPCS: Performed by: SURGERY

## 2023-10-26 PROCEDURE — 99232 PR SUBSEQUENT HOSPITAL CARE,LEVL II: ICD-10-PCS | Mod: ,,, | Performed by: INTERNAL MEDICINE

## 2023-10-26 RX ORDER — AMLODIPINE BESYLATE 5 MG/1
5 TABLET ORAL DAILY
Status: DISCONTINUED | OUTPATIENT
Start: 2023-10-26 | End: 2023-10-28

## 2023-10-26 RX ORDER — FENTANYL CITRATE 50 UG/ML
INJECTION, SOLUTION INTRAMUSCULAR; INTRAVENOUS
Status: DISCONTINUED | OUTPATIENT
Start: 2023-10-25 | End: 2023-10-26

## 2023-10-26 RX ADMIN — MEROPENEM 500 MG: 500 INJECTION, POWDER, FOR SOLUTION INTRAVENOUS at 09:10

## 2023-10-26 RX ADMIN — MUPIROCIN: 20 OINTMENT TOPICAL at 10:10

## 2023-10-26 RX ADMIN — AMLODIPINE BESYLATE 5 MG: 5 TABLET ORAL at 11:10

## 2023-10-26 RX ADMIN — VANCOMYCIN HYDROCHLORIDE 1500 MG: 1 INJECTION, POWDER, LYOPHILIZED, FOR SOLUTION INTRAVENOUS at 10:10

## 2023-10-26 RX ADMIN — INSULIN ASPART 6 UNITS: 100 INJECTION, SOLUTION INTRAVENOUS; SUBCUTANEOUS at 07:10

## 2023-10-26 RX ADMIN — ENOXAPARIN SODIUM 30 MG: 100 INJECTION SUBCUTANEOUS at 06:10

## 2023-10-26 RX ADMIN — ONDANSETRON 4 MG: 2 INJECTION INTRAMUSCULAR; INTRAVENOUS at 11:10

## 2023-10-26 RX ADMIN — MUPIROCIN: 20 OINTMENT TOPICAL at 09:10

## 2023-10-26 NOTE — PLAN OF CARE
Problem: Adult Inpatient Plan of Care  Goal: Plan of Care Review  Outcome: Ongoing, Progressing  Goal: Patient-Specific Goal (Individualized)  Outcome: Ongoing, Progressing  Goal: Absence of Hospital-Acquired Illness or Injury  Outcome: Ongoing, Progressing  Goal: Optimal Comfort and Wellbeing  Outcome: Ongoing, Progressing     Problem: Fall Injury Risk  Goal: Absence of Fall and Fall-Related Injury  Outcome: Ongoing, Progressing     Problem: Impaired Wound Healing  Goal: Optimal Wound Healing  Outcome: Ongoing, Progressing

## 2023-10-26 NOTE — PROGRESS NOTES
Pharmacist Renal Dose Adjustment Note    Lee Escobar is a 38 y.o. male being treated with the medication Merrem    Patient Data:    Vital Signs (Most Recent):  Temp: 97 °F (36.1 °C) (10/26/23 0653)  Pulse: 92 (10/26/23 0653)  Resp: 18 (10/26/23 0653)  BP: (!) 147/76 (10/26/23 0653)  SpO2: 99 % (10/26/23 0653) Vital Signs (72h Range):  Temp:  [97 °F (36.1 °C)-99.5 °F (37.5 °C)]   Pulse:  [75-92]   Resp:  [12-24]   BP: (118-165)/(59-89)   SpO2:  [95 %-100 %]      Recent Labs   Lab 10/22/23  1210 10/24/23  1825 10/25/23  0513   CREATININE 3.55* 2.80* 2.69*     Serum creatinine: 2.69 mg/dL (H) 10/25/23 0513  Estimated creatinine clearance: 32.7 mL/min (A)    Medication:Merrem dose: 1g frequency q12hrs will be changed to medication:Merrem dose:500mg frequency:q24hrs    Pharmacist's Name: Dominguez Church  Pharmacist's Extension: [phone]

## 2023-10-26 NOTE — SUBJECTIVE & OBJECTIVE
Interval History:  Patient is status post debridement of buttock wounds.  White count today is 16.  Preliminary wound cultures showing Gram-negative bacilli.  Sensitivity pending.  Inpatient wound care consulted    Medications:  Continuous Infusions:  Scheduled Meds:   amLODIPine  5 mg Oral Daily    enoxparin  30 mg Subcutaneous Q24H (prophylaxis, 1700)    meropenem (MERREM) IVPB  500 mg Intravenous Q24H    mupirocin   Nasal BID     PRN Meds:sodium chloride 0.9%, dextrose 10%, dextrose 10%, glucagon (human recombinant), glucose, glucose, hydrALAZINE, insulin aspart U-100, naloxone, ondansetron, sodium chloride 0.9%, vancomycin - pharmacy to dose     Review of patient's allergies indicates:  No Known Allergies  Objective:     Vital Signs (Most Recent):  Temp: 98.6 °F (37 °C) (10/26/23 1027)  Pulse: 91 (10/26/23 1027)  Resp: 18 (10/26/23 1027)  BP: (!) 150/74 (10/26/23 1027)  SpO2: 100 % (10/26/23 1027) Vital Signs (24h Range):  Temp:  [97 °F (36.1 °C)-98.6 °F (37 °C)] 98.6 °F (37 °C)  Pulse:  [79-92] 91  Resp:  [12-18] 18  SpO2:  [95 %-100 %] 100 %  BP: (128-162)/(59-89) 150/74     Weight: 72.6 kg (160 lb)  Body mass index is 26.63 kg/m².    Intake/Output - Last 3 Shifts         10/24 0700  10/25 0659 10/25 0700  10/26 0659 10/26 0700  10/27 0659    Other  0     IV Piggyback  75     Total Intake(mL/kg)  75 (1)     Other  1500     Total Output  1500     Net  -1425                     Physical Exam  Vitals and nursing note reviewed.   Constitutional:       General: He is not in acute distress.     Appearance: Normal appearance.   HENT:      Head: Normocephalic and atraumatic.      Right Ear: External ear normal.      Left Ear: External ear normal.      Nose: Nose normal.      Mouth/Throat:      Mouth: Mucous membranes are moist.   Eyes:      General:         Right eye: No discharge.         Left eye: No discharge.      Extraocular Movements: Extraocular movements intact.   Cardiovascular:      Rate and Rhythm: Normal  rate and regular rhythm.      Pulses: Normal pulses.      Heart sounds: Normal heart sounds.   Pulmonary:      Effort: Pulmonary effort is normal.      Breath sounds: Normal breath sounds.   Abdominal:      Palpations: Abdomen is soft.      Tenderness: There is no abdominal tenderness. There is no guarding or rebound.   Musculoskeletal:      Cervical back: Neck supple.      Right lower leg: No edema.      Left lower leg: No edema.   Skin:     General: Skin is warm.      Capillary Refill: Capillary refill takes less than 2 seconds.      Comments: Bilateral sacral wounds   Neurological:      Mental Status: He is alert. Mental status is at baseline.   Psychiatric:         Mood and Affect: Mood normal.         Behavior: Behavior normal.          Significant Labs:  I have reviewed all pertinent lab results within the past 24 hours.    Significant Diagnostics:  I have reviewed all pertinent imaging results/findings within the past 24 hours.
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
## 2023-10-26 NOTE — ASSESSMENT & PLAN NOTE
OR for wound debridement today.   IV Meropenem  Adjust antibx to cx  10/26/23  status post debridement of buttock wounds.  White count today is 16.  Preliminary wound cultures showing Gram-negative bacilli.  Sensitivity pending.  Inpatient wound care consulted
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
## 2023-10-26 NOTE — PROGRESS NOTES
"[facility]  Nephrology  Progress Note    Patient Name: Lee Escobar  MRN: [id number]  Admission Date: 10/24/2023  Hospital Length of Stay: 2 days  Attending Provider: Tosin Bobby MD   Primary Care Physician: Maria Elena Beck II, MD  Principal Problem:Wound infection    Consults  Subjective:     Interval History: Mr. Escobar is seen in f/u of his ESRD. He had dialysis and debridement of his wounds yesterday. He is awake today and seems a bit more alert and "with it". Continuing antibiotics for wound/UTI. Planning dialysis for tomorrow.    Review of patient's allergies indicates:  No Known Allergies  Current Facility-Administered Medications   Medication Frequency    0.9%  NaCl infusion PRN    amLODIPine tablet 5 mg Daily    dextrose 10% bolus 125 mL 125 mL PRN    dextrose 10% bolus 250 mL 250 mL PRN    enoxaparin injection 30 mg Q24H (prophylaxis, 1700)    glucagon (human recombinant) injection 1 mg PRN    glucose chewable tablet 16 g PRN    glucose chewable tablet 24 g PRN    hydrALAZINE injection 10 mg Q6H PRN    insulin aspart U-100 injection 0-10 Units Q6H PRN    meropenem (MERREM) 500 mg in sodium chloride 0.9 % 100 mL IVPB (MB+) Q24H    mupirocin 2 % ointment BID    naloxone 0.4 mg/mL injection 0.02 mg PRN    ondansetron injection 4 mg Q8H PRN    sodium chloride 0.9% flush 10 mL Q12H PRN    vancomycin - pharmacy to dose pharmacy to manage frequency       Objective:     Vital Signs (Most Recent):  Temp: 98.6 °F (37 °C) (10/26/23 1027)  Pulse: 91 (10/26/23 1027)  Resp: 18 (10/26/23 1027)  BP: (!) 150/74 (10/26/23 1027)  SpO2: 100 % (10/26/23 1027) Vital Signs (24h Range):  Temp:  [97 °F (36.1 °C)-98.6 °F (37 °C)] 98.6 °F (37 °C)  Pulse:  [79-92] 91  Resp:  [12-18] 18  SpO2:  [95 %-100 %] 100 %  BP: (128-159)/(68-89) 150/74     Weight: 72.6 kg (160 lb) (10/26/23 0471)  Body mass index is 26.63 kg/m².  Body surface area is 1.82 meters squared.    I/O last 3 completed " shifts:  In: 75 [IV Piggyback:75]  Out: 1500 [Other:1500]    Physical Exam Thin and not edematous. Chest clear. Awake and responsive    Significant Labs:sureBMP:   Recent Labs   Lab 10/25/23  0513 10/26/23  1058   * 200*    138   K 3.6 3.9   * 107   CO2 29 20*   BUN 39* 24*   CREATININE 2.69* 2.05*   CALCIUM 8.2* 8.5   MG 2.3  --      CBC:   Recent Labs   Lab 10/26/23  1058   WBC 16.50*   RBC 3.45*   HGB 9.5*   HCT 27.7*      MCV 80.3   MCH 27.5   MCHC 34.3     All labs within the past 24 hours have been reviewed.    Significant Imaging:  Labs: Reviewed    Assessment/Plan:     Active Diagnoses:    Diagnosis Date Noted POA    PRINCIPAL PROBLEM:  Sacral wound [T14.8XXA, L08.9] 05/25/2023 Yes     Chronic    Encephalopathy, metabolic [G93.41] 10/24/2023 Yes    UTI (urinary tract infection) [N39.0] 08/20/2023 Yes    Paraplegia following spinal cord injury [G82.20]  Not Applicable     Chronic    HTN (hypertension) [I10] 05/25/2023 Yes     Chronic    ESRD (end stage renal disease) [N18.6] 05/25/2023 Yes    Type 2 diabetes mellitus with hyperglycemia [E11.65] 04/25/2021 Yes    Anemia in ESRD (end-stage renal disease) [N18.6, D63.1] 09/17/2019 Yes      Problems Resolved During this Admission:       We'll plan to dialyze tomorrow. WBC coming down and he's afebrile.    Thank you for your consult. I will follow-up with patient. Please contact us if you have any additional questions.    Devan Aponte MD  Nephrology  [facility]

## 2023-10-26 NOTE — PROGRESS NOTES
[facility]  General Surgery  Progress Note    Subjective:     History of Present Illness:  38-year-old male past medical history end-stage renal disease on HD, paraplegia, diabetes who was seen in consult today for nonhealing sacral wound.  Patient is a resident of [facility] who was admitted from the emergency room for altered mental status.  Patient is nonverbal and unable to provide any medical history.  Patient has sacral wound and wound on left buttock.     In the ED, the patient was afebrile and vital sign stable. CT head was negative for acute findings. Ensuring work was significant for UA which suggest UTI and leukocytosis with left shift band. Other findings were chronic microcytic anemia and CKD with CR of 2.8 and hypoalbuminemia of 1.4. Plan to take patient to the OR for wound debridement today.                 Post-Op Info:  Procedure(s) (LRB):  DEBRIDEMENT, BUTTOCK (Bilateral)   1 Day Post-Op     Interval History:  Patient is status post debridement of buttock wounds.  White count today is 16.  Preliminary wound cultures showing Gram-negative bacilli.  Sensitivity pending.  Inpatient wound care consulted    Medications:  Continuous Infusions:  Scheduled Meds:   amLODIPine  5 mg Oral Daily    enoxparin  30 mg Subcutaneous Q24H (prophylaxis, 1700)    meropenem (MERREM) IVPB  500 mg Intravenous Q24H    mupirocin   Nasal BID     PRN Meds:sodium chloride 0.9%, dextrose 10%, dextrose 10%, glucagon (human recombinant), glucose, glucose, hydrALAZINE, insulin aspart U-100, naloxone, ondansetron, sodium chloride 0.9%, vancomycin - pharmacy to dose     Review of patient's allergies indicates:  No Known Allergies  Objective:     Vital Signs (Most Recent):  Temp: 98.6 °F (37 °C) (10/26/23 1027)  Pulse: 91 (10/26/23 1027)  Resp: 18 (10/26/23 1027)  BP: (!) 150/74 (10/26/23 1027)  SpO2: 100 % (10/26/23 1027) Vital Signs (24h Range):  Temp:  [97 °F (36.1 °C)-98.6 °F (37 °C)]  98.6 °F (37 °C)  Pulse:  [79-92] 91  Resp:  [12-18] 18  SpO2:  [95 %-100 %] 100 %  BP: (128-162)/(59-89) 150/74     Weight: 72.6 kg (160 lb)  Body mass index is 26.63 kg/m².    Intake/Output - Last 3 Shifts         10/24 0700  10/25 0659 10/25 0700  10/26 0659 10/26 0700  10/27 0659    Other  0     IV Piggyback  75     Total Intake(mL/kg)  75 (1)     Other  1500     Total Output  1500     Net  -1425                     Physical Exam  Vitals and nursing note reviewed.   Constitutional:       General: He is not in acute distress.     Appearance: Normal appearance.   HENT:      Head: Normocephalic and atraumatic.      Right Ear: External ear normal.      Left Ear: External ear normal.      Nose: Nose normal.      Mouth/Throat:      Mouth: Mucous membranes are moist.   Eyes:      General:         Right eye: No discharge.         Left eye: No discharge.      Extraocular Movements: Extraocular movements intact.   Cardiovascular:      Rate and Rhythm: Normal rate and regular rhythm.      Pulses: Normal pulses.      Heart sounds: Normal heart sounds.   Pulmonary:      Effort: Pulmonary effort is normal.      Breath sounds: Normal breath sounds.   Abdominal:      Palpations: Abdomen is soft.      Tenderness: There is no abdominal tenderness. There is no guarding or rebound.   Musculoskeletal:      Cervical back: Neck supple.      Right lower leg: No edema.      Left lower leg: No edema.   Skin:     General: Skin is warm.      Capillary Refill: Capillary refill takes less than 2 seconds.      Comments: Bilateral sacral wounds   Neurological:      Mental Status: He is alert. Mental status is at baseline.   Psychiatric:         Mood and Affect: Mood normal.         Behavior: Behavior normal.          Significant Labs:  I have reviewed all pertinent lab results within the past 24 hours.    Significant Diagnostics:  I have reviewed all pertinent imaging results/findings within the past 24 hours.    Assessment/Plan:     * Sacral  wound  OR for wound debridement today.   IV Meropenem  Adjust antibx to cx  10/26/23  status post debridement of buttock wounds.  White count today is 16.  Preliminary wound cultures showing Gram-negative bacilli.  Sensitivity pending.  Inpatient wound care consulted           ANDRIA Irene  General Surgery  [facility]

## 2023-10-26 NOTE — SUBJECTIVE & OBJECTIVE
Interval History:     Pt alert and oriented, tells me his bagback is misplaced , told the Charge RN about it.  He has no new complains  RD to see him for malnutrition.     Review of Systems   Respiratory: Negative.     Cardiovascular: Negative.    Gastrointestinal: Negative.      Objective:     Vital Signs (Most Recent):  Temp: 98.6 °F (37 °C) (10/26/23 1027)  Pulse: 91 (10/26/23 1027)  Resp: 18 (10/26/23 1027)  BP: (!) 150/74 (10/26/23 1027)  SpO2: 100 % (10/26/23 1027) Vital Signs (24h Range):  Temp:  [97 °F (36.1 °C)-98.7 °F (37.1 °C)] 98.6 °F (37 °C)  Pulse:  [79-92] 91  Resp:  [12-18] 18  SpO2:  [95 %-100 %] 100 %  BP: (128-165)/(59-89) 150/74     Weight: 72.6 kg (160 lb)  Body mass index is 26.63 kg/m².    Intake/Output Summary (Last 24 hours) at 10/26/2023 1041  Last data filed at 10/25/2023 1823  Gross per 24 hour   Intake 75 ml   Output 1500 ml   Net -1425 ml         Physical Exam  Vitals and nursing note reviewed.   Constitutional:       General: He is not in acute distress.     Appearance: Normal appearance. He is ill-appearing.   HENT:      Head: Normocephalic and atraumatic.      Right Ear: External ear normal.      Left Ear: External ear normal.      Nose: Nose normal.   Eyes:      General:         Right eye: No discharge.         Left eye: No discharge.      Conjunctiva/sclera: Conjunctivae normal.   Cardiovascular:      Rate and Rhythm: Normal rate and regular rhythm.      Pulses: Normal pulses.      Heart sounds: Normal heart sounds.   Pulmonary:      Effort: Pulmonary effort is normal.      Breath sounds: Normal breath sounds.   Abdominal:      Palpations: Abdomen is soft.      Tenderness: There is no abdominal tenderness. There is no guarding or rebound.   Musculoskeletal:      Cervical back: Neck supple.      Right lower leg: No edema.      Left lower leg: No edema.   Skin:     Findings: Lesion present.   Neurological:      Mental Status: He is alert. Mental status is at baseline.      Comments:  He is drowsy but, able to communicate and is alert and oriented now   Psychiatric:         Mood and Affect: Mood normal.      Comments:               Significant Labs: All pertinent labs within the past 24 hours have been reviewed.    Significant Imaging: I have reviewed all pertinent imaging results/findings within the past 24 hours.

## 2023-10-26 NOTE — ADDENDUM NOTE
Addendum  created 10/26/23 0758 by Cosme Moore Jr., CRNA    Intraprocedure Meds edited, Orders acknowledged in Narrator
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
## 2023-10-27 LAB
ALBUMIN SERPL BCP-MCNC: 1.5 G/DL (ref 3.5–5)
ALBUMIN/GLOB SERPL: 0.4 {RATIO}
ALP SERPL-CCNC: 159 U/L (ref 45–115)
ALT SERPL W P-5'-P-CCNC: 8 U/L (ref 16–61)
ANION GAP SERPL CALCULATED.3IONS-SCNC: 13 MMOL/L (ref 7–16)
AST SERPL W P-5'-P-CCNC: 5 U/L (ref 15–37)
BASOPHILS # BLD AUTO: 0.02 K/UL (ref 0–0.2)
BASOPHILS NFR BLD AUTO: 0.2 % (ref 0–1)
BILIRUB SERPL-MCNC: 0.8 MG/DL (ref ?–1.2)
BUN SERPL-MCNC: 35 MG/DL (ref 7–18)
BUN/CREAT SERPL: 11 (ref 6–20)
CALCIUM SERPL-MCNC: 8.5 MG/DL (ref 8.5–10.1)
CHLORIDE SERPL-SCNC: 107 MMOL/L (ref 98–107)
CO2 SERPL-SCNC: 24 MMOL/L (ref 21–32)
CREAT SERPL-MCNC: 3.1 MG/DL (ref 0.7–1.3)
DIFFERENTIAL METHOD BLD: ABNORMAL
EGFR (NO RACE VARIABLE) (RUSH/TITUS): 26 ML/MIN/1.73M2
EOSINOPHIL # BLD AUTO: 0.2 K/UL (ref 0–0.5)
EOSINOPHIL NFR BLD AUTO: 1.6 % (ref 1–4)
ERYTHROCYTE [DISTWIDTH] IN BLOOD BY AUTOMATED COUNT: 15.2 % (ref 11.5–14.5)
ESTROGEN SERPL-MCNC: NORMAL PG/ML
GLOBULIN SER-MCNC: 3.8 G/DL (ref 2–4)
GLUCOSE SERPL-MCNC: 139 MG/DL (ref 70–105)
GLUCOSE SERPL-MCNC: 194 MG/DL (ref 70–105)
GLUCOSE SERPL-MCNC: 352 MG/DL (ref 70–105)
GLUCOSE SERPL-MCNC: 367 MG/DL (ref 74–106)
HAV IGM SER QL: NORMAL
HBV CORE IGM SER QL: NORMAL
HBV SURFACE AG SERPL QL IA: NORMAL
HCT VFR BLD AUTO: 29.3 % (ref 40–54)
HCV AB SER QL: NORMAL
HGB BLD-MCNC: 9.6 G/DL (ref 13.5–18)
IMM GRANULOCYTES # BLD AUTO: 0.15 K/UL (ref 0–0.04)
IMM GRANULOCYTES NFR BLD: 1.2 % (ref 0–0.4)
INSULIN SERPL-ACNC: NORMAL U[IU]/ML
LAB AP GROSS DESCRIPTION: NORMAL
LAB AP LABORATORY NOTES: NORMAL
LYMPHOCYTES # BLD AUTO: 0.79 K/UL (ref 1–4.8)
LYMPHOCYTES NFR BLD AUTO: 6.3 % (ref 27–41)
MCH RBC QN AUTO: 26.7 PG (ref 27–31)
MCHC RBC AUTO-ENTMCNC: 32.8 G/DL (ref 32–36)
MCV RBC AUTO: 81.6 FL (ref 80–96)
MONOCYTES # BLD AUTO: 0.67 K/UL (ref 0–0.8)
MONOCYTES NFR BLD AUTO: 5.3 % (ref 2–6)
MPC BLD CALC-MCNC: 10.4 FL (ref 9.4–12.4)
NEUTROPHILS # BLD AUTO: 10.71 K/UL (ref 1.8–7.7)
NEUTROPHILS NFR BLD AUTO: 85.4 % (ref 53–65)
NRBC # BLD AUTO: 0 X10E3/UL
NRBC, AUTO (.00): 0 %
PLATELET # BLD AUTO: 178 K/UL (ref 150–400)
POTASSIUM SERPL-SCNC: 4.1 MMOL/L (ref 3.5–5.1)
PROT SERPL-MCNC: 5.3 G/DL (ref 6.4–8.2)
RBC # BLD AUTO: 3.59 M/UL (ref 4.6–6.2)
SODIUM SERPL-SCNC: 140 MMOL/L (ref 136–145)
T3RU NFR SERPL: NORMAL %
WBC # BLD AUTO: 12.54 K/UL (ref 4.5–11)

## 2023-10-27 PROCEDURE — 63600175 PHARM REV CODE 636 W HCPCS: Performed by: SURGERY

## 2023-10-27 PROCEDURE — 80074 ACUTE HEPATITIS PANEL: CPT | Performed by: INTERNAL MEDICINE

## 2023-10-27 PROCEDURE — 25000003 PHARM REV CODE 250: Performed by: SURGERY

## 2023-10-27 PROCEDURE — 90935 HEMODIALYSIS ONE EVALUATION: CPT

## 2023-10-27 PROCEDURE — 82962 GLUCOSE BLOOD TEST: CPT

## 2023-10-27 PROCEDURE — 25000003 PHARM REV CODE 250: Performed by: INTERNAL MEDICINE

## 2023-10-27 PROCEDURE — 99232 PR SUBSEQUENT HOSPITAL CARE,LEVL II: ICD-10-PCS | Mod: ,,, | Performed by: INTERNAL MEDICINE

## 2023-10-27 PROCEDURE — 11000001 HC ACUTE MED/SURG PRIVATE ROOM

## 2023-10-27 PROCEDURE — 80053 COMPREHEN METABOLIC PANEL: CPT | Performed by: INTERNAL MEDICINE

## 2023-10-27 PROCEDURE — 85025 COMPLETE CBC W/AUTO DIFF WBC: CPT | Performed by: INTERNAL MEDICINE

## 2023-10-27 PROCEDURE — 63600175 PHARM REV CODE 636 W HCPCS

## 2023-10-27 PROCEDURE — 99232 SBSQ HOSP IP/OBS MODERATE 35: CPT | Mod: ,,, | Performed by: INTERNAL MEDICINE

## 2023-10-27 PROCEDURE — 94761 N-INVAS EAR/PLS OXIMETRY MLT: CPT

## 2023-10-27 PROCEDURE — 63600175 PHARM REV CODE 636 W HCPCS: Performed by: INTERNAL MEDICINE

## 2023-10-27 RX ORDER — BISACODYL 10 MG
10 SUPPOSITORY, RECTAL RECTAL DAILY PRN
Status: DISCONTINUED | OUTPATIENT
Start: 2023-10-27 | End: 2023-10-29 | Stop reason: HOSPADM

## 2023-10-27 RX ORDER — LACTULOSE 10 G/15ML
20 SOLUTION ORAL 3 TIMES DAILY
Status: DISCONTINUED | OUTPATIENT
Start: 2023-10-27 | End: 2023-10-29 | Stop reason: HOSPADM

## 2023-10-27 RX ADMIN — ONDANSETRON 4 MG: 2 INJECTION INTRAMUSCULAR; INTRAVENOUS at 08:10

## 2023-10-27 RX ADMIN — MUPIROCIN: 20 OINTMENT TOPICAL at 08:10

## 2023-10-27 RX ADMIN — INSULIN ASPART 10 UNITS: 100 INJECTION, SOLUTION INTRAVENOUS; SUBCUTANEOUS at 06:10

## 2023-10-27 RX ADMIN — AMPICILLIN SODIUM AND SULBACTAM SODIUM 1.5 G: 1; .5 INJECTION, POWDER, FOR SOLUTION INTRAMUSCULAR; INTRAVENOUS at 05:10

## 2023-10-27 RX ADMIN — INSULIN DETEMIR 10 UNITS: 100 INJECTION, SOLUTION SUBCUTANEOUS at 08:10

## 2023-10-27 RX ADMIN — AMLODIPINE BESYLATE 5 MG: 5 TABLET ORAL at 08:10

## 2023-10-27 RX ADMIN — SODIUM CHLORIDE: 9 INJECTION, SOLUTION INTRAVENOUS at 03:10

## 2023-10-27 RX ADMIN — ENOXAPARIN SODIUM 30 MG: 100 INJECTION SUBCUTANEOUS at 05:10

## 2023-10-27 NOTE — SUBJECTIVE & OBJECTIVE
Interval History:  No acute events overnight.  White blood count down to 12.  Wound care shows E coli and Enterococcus.  Currently on Unasyn.    Medications:  Continuous Infusions:  Scheduled Meds:   amLODIPine  5 mg Oral Daily    ampicillin-sulbactim (UNASYN) IVPB  1.5 g Intravenous Q12H    enoxparin  30 mg Subcutaneous Q24H (prophylaxis, 1700)    insulin detemir U-100  10 Units Subcutaneous BID    mupirocin   Nasal BID     PRN Meds:sodium chloride 0.9%, dextrose 10%, dextrose 10%, glucagon (human recombinant), glucose, glucose, hydrALAZINE, insulin aspart U-100, naloxone, ondansetron, sodium chloride 0.9%     Review of patient's allergies indicates:  No Known Allergies  Objective:     Vital Signs (Most Recent):  Temp: 98.1 °F (36.7 °C) (10/27/23 1015)  Pulse: 87 (10/27/23 1430)  Resp: 18 (10/27/23 1430)  BP: 132/83 (10/27/23 1430)  SpO2:  (in dialysis) (10/27/23 1300) Vital Signs (24h Range):  Temp:  [97.9 °F (36.6 °C)-99 °F (37.2 °C)] 98.1 °F (36.7 °C)  Pulse:  [83-93] 87  Resp:  [18-22] 18  SpO2:  [94 %-98 %] 94 %  BP: (126-157)/(71-88) 132/83     Weight: 72.6 kg (160 lb)  Body mass index is 26.63 kg/m².    Intake/Output - Last 3 Shifts         10/25 0700  10/26 0659 10/26 0700  10/27 0659 10/27 0700  10/28 0659    Other 0      IV Piggyback 75      Total Intake(mL/kg) 75 (1)      Other 1500      Total Output 1500      Net -1425                      Physical Exam  Vitals and nursing note reviewed.   Constitutional:       General: He is not in acute distress.     Appearance: Normal appearance.   HENT:      Head: Normocephalic and atraumatic.      Right Ear: External ear normal.      Left Ear: External ear normal.      Nose: Nose normal.      Mouth/Throat:      Mouth: Mucous membranes are moist.   Eyes:      General:         Right eye: No discharge.         Left eye: No discharge.      Extraocular Movements: Extraocular movements intact.   Cardiovascular:      Rate and Rhythm: Normal rate and regular rhythm.       Pulses: Normal pulses.      Heart sounds: Normal heart sounds.   Pulmonary:      Effort: Pulmonary effort is normal.      Breath sounds: Normal breath sounds.   Abdominal:      Palpations: Abdomen is soft.      Tenderness: There is no abdominal tenderness. There is no guarding or rebound.   Musculoskeletal:      Cervical back: Neck supple.      Right lower leg: No edema.      Left lower leg: No edema.   Skin:     General: Skin is warm.      Capillary Refill: Capillary refill takes less than 2 seconds.      Comments: Bilateral sacral wounds   Neurological:      Mental Status: He is alert. Mental status is at baseline.   Psychiatric:         Mood and Affect: Mood normal.         Behavior: Behavior normal.          Significant Labs:  I have reviewed all pertinent lab results within the past 24 hours.    Significant Diagnostics:  I have reviewed all pertinent imaging results/findings within the past 24 hours.

## 2023-10-27 NOTE — PLAN OF CARE
Problem: Adult Inpatient Plan of Care  Goal: Plan of Care Review  Outcome: Ongoing, Progressing  Goal: Patient-Specific Goal (Individualized)  Outcome: Ongoing, Progressing  Goal: Absence of Hospital-Acquired Illness or Injury  Outcome: Ongoing, Progressing  Goal: Optimal Comfort and Wellbeing  Outcome: Ongoing, Progressing  Goal: Readiness for Transition of Care  Outcome: Ongoing, Progressing     Problem: Skin Injury Risk Increased  Goal: Skin Health and Integrity  Outcome: Ongoing, Progressing     Problem: Fall Injury Risk  Goal: Absence of Fall and Fall-Related Injury  Outcome: Ongoing, Progressing     Problem: Impaired Wound Healing  Goal: Optimal Wound Healing  Outcome: Ongoing, Progressing     Problem: Device-Related Complication Risk (Hemodialysis)  Goal: Safe, Effective Therapy Delivery  Outcome: Ongoing, Progressing     Problem: Hemodynamic Instability (Hemodialysis)  Goal: Effective Tissue Perfusion  Outcome: Ongoing, Progressing     Problem: Infection (Hemodialysis)  Goal: Absence of Infection Signs and Symptoms  Outcome: Ongoing, Progressing     Problem: Diabetes Comorbidity  Goal: Blood Glucose Level Within Targeted Range  Outcome: Ongoing, Progressing

## 2023-10-27 NOTE — SUBJECTIVE & OBJECTIVE
Interval History:     Pt says he has found his bag, his sugars running high, start long acting   Cont braod spectrum abx, cont wound care, wbc count improving  Awaiting final culture.      Review of Systems   Respiratory: Negative.     Cardiovascular: Negative.    Gastrointestinal: Negative.      Objective:     Vital Signs (Most Recent):  Temp: 98.1 °F (36.7 °C) (10/27/23 1015)  Pulse: 85 (10/27/23 1015)  Resp: (!) 22 (10/27/23 1015)  BP: (!) 155/74 (10/27/23 1015)  SpO2: (!) 94 % (10/27/23 1015) Vital Signs (24h Range):  Temp:  [97.9 °F (36.6 °C)-99.6 °F (37.6 °C)] 98.1 °F (36.7 °C)  Pulse:  [85-95] 85  Resp:  [18-22] 22  SpO2:  [94 %-98 %] 94 %  BP: (144-155)/(71-80) 155/74     Weight: 72.6 kg (160 lb)  Body mass index is 26.63 kg/m².  No intake or output data in the 24 hours ending 10/27/23 1218        Physical Exam  Vitals and nursing note reviewed.   Constitutional:       General: He is not in acute distress.     Appearance: Normal appearance. He is ill-appearing.   HENT:      Head: Normocephalic and atraumatic.      Right Ear: External ear normal.      Left Ear: External ear normal.      Nose: Nose normal.   Eyes:      General:         Right eye: No discharge.         Left eye: No discharge.      Conjunctiva/sclera: Conjunctivae normal.   Cardiovascular:      Rate and Rhythm: Normal rate and regular rhythm.      Pulses: Normal pulses.      Heart sounds: Normal heart sounds.   Pulmonary:      Effort: Pulmonary effort is normal.      Breath sounds: Normal breath sounds.   Abdominal:      Palpations: Abdomen is soft.      Tenderness: There is no abdominal tenderness. There is no guarding or rebound.   Musculoskeletal:      Cervical back: Neck supple.      Right lower leg: No edema.      Left lower leg: No edema.   Skin:     Findings: Lesion present.   Neurological:      Mental Status: He is alert. Mental status is at baseline.      Comments: He is drowsy but, able to communicate and is alert and oriented now    Psychiatric:         Mood and Affect: Mood normal.      Comments:               Significant Labs: All pertinent labs within the past 24 hours have been reviewed.    Significant Imaging: I have reviewed all pertinent imaging results/findings within the past 24 hours.

## 2023-10-27 NOTE — PROGRESS NOTES
Pharmacy consulted to renally dose Unasyn for SSTI in this dialysis. Drug is correctly dosed @ 1.5g every 12 hours. Timed for 1800 and 0600 to make sure patient gets a dose post-dialysis.    Thank you for the consult,  Sarahi Perry, PharmD  [id number]

## 2023-10-27 NOTE — ASSESSMENT & PLAN NOTE
OR for wound debridement today.   IV Meropenem  Adjust antibx to cx  10/26/23  status post debridement of buttock wounds.  White count today is 16.  Preliminary wound cultures showing Gram-negative bacilli.  Sensitivity pending.  Inpatient wound care consulted  10/27/23   White blood count down to 12.  Wound care shows E coli and Enterococcus.  Currently on Unasyn.  Dsg change. Mild malodor. Consider washout of wound.

## 2023-10-27 NOTE — PLAN OF CARE
Problem: Adult Inpatient Plan of Care  Goal: Plan of Care Review  10/27/2023 1101 by JAMIE MEDINA  Outcome: Ongoing, Progressing  10/27/2023 1101 by JAMIE MEDINA  Outcome: Ongoing, Progressing  Goal: Patient-Specific Goal (Individualized)  10/27/2023 1101 by JAMIE MEDINA  Outcome: Ongoing, Progressing  10/27/2023 1101 by JAMIE MEDINA  Outcome: Ongoing, Progressing  Goal: Absence of Hospital-Acquired Illness or Injury  10/27/2023 1101 by JAMIE MEDINA  Outcome: Ongoing, Progressing  10/27/2023 1101 by JAMIE MEDINA  Outcome: Ongoing, Progressing  Goal: Optimal Comfort and Wellbeing  10/27/2023 1101 by JAMIE MEDINA  Outcome: Ongoing, Progressing  10/27/2023 1101 by JAMIE MEDINA  Outcome: Ongoing, Progressing  Goal: Readiness for Transition of Care  10/27/2023 1101 by JAMIE MEDINA  Outcome: Ongoing, Progressing  10/27/2023 1101 by JAMIE MEDINA  Outcome: Ongoing, Progressing     Problem: Skin Injury Risk Increased  Goal: Skin Health and Integrity  10/27/2023 1101 by JAMIE MEDINA  Outcome: Ongoing, Progressing  10/27/2023 1101 by JAMIE MEDINA  Outcome: Ongoing, Progressing     Problem: Fall Injury Risk  Goal: Absence of Fall and Fall-Related Injury  10/27/2023 1101 by JAMIE MEDINA  Outcome: Ongoing, Progressing  10/27/2023 1101 by JAMIE MEDINA  Outcome: Ongoing, Progressing     Problem: Impaired Wound Healing  Goal: Optimal Wound Healing  10/27/2023 1101 by JAMIE MEDINA  Outcome: Ongoing, Progressing  10/27/2023 1101 by JAMIE MEDINA  Outcome: Ongoing, Progressing     Problem: Device-Related Complication Risk (Hemodialysis)  Goal: Safe, Effective Therapy Delivery  10/27/2023 1101 by JAMIE MEDINA  Outcome: Ongoing, Progressing  10/27/2023 1101 by JAMIE MEDINA  Outcome: Ongoing, Progressing     Problem: Hemodynamic Instability (Hemodialysis)  Goal: Effective Tissue Perfusion  10/27/2023 1101 by JAMIE MEDINA  Outcome: Ongoing,  Progressing  10/27/2023 1101 by JAMIE MEDINA  Outcome: Ongoing, Progressing     Problem: Infection (Hemodialysis)  Goal: Absence of Infection Signs and Symptoms  10/27/2023 1101 by JAMIE MEDINA  Outcome: Ongoing, Progressing  10/27/2023 1101 by JAMIE MEDINA  Outcome: Ongoing, Progressing     Problem: Diabetes Comorbidity  Goal: Blood Glucose Level Within Targeted Range  10/27/2023 1101 by JMAIE MEDINA  Outcome: Ongoing, Progressing  10/27/2023 1101 by JAMIE MEDINA  Outcome: Ongoing, Progressing

## 2023-10-27 NOTE — PROGRESS NOTES
[facility]  Nephrology  Progress Note    Patient Name: Lee Escobar  MRN: [id number]  Admission Date: 10/24/2023  Hospital Length of Stay: 3 days  Attending Provider: Tosin Bobby MD   Primary Care Physician: Maria Elena Beck II, MD  Principal Problem:Wound infection    Consults  Subjective:     Interval History: Mr. Escobar is seen in f/u of his ESRD. He's lying flat and not SOB. Afebrile. We'll plan to dialyze as per usual schedule today. He's two days post debridement of sacral wound.    Review of patient's allergies indicates:  No Known Allergies  Current Facility-Administered Medications   Medication Frequency    0.9%  NaCl infusion PRN    amLODIPine tablet 5 mg Daily    dextrose 10% bolus 125 mL 125 mL PRN    dextrose 10% bolus 250 mL 250 mL PRN    enoxaparin injection 30 mg Q24H (prophylaxis, 1700)    glucagon (human recombinant) injection 1 mg PRN    glucose chewable tablet 16 g PRN    glucose chewable tablet 24 g PRN    hydrALAZINE injection 10 mg Q6H PRN    insulin aspart U-100 injection 0-10 Units Q6H PRN    insulin detemir U-100 injection 10 Units BID    meropenem (MERREM) 500 mg in sodium chloride 0.9 % 100 mL IVPB (MB+) Q24H    mupirocin 2 % ointment BID    naloxone 0.4 mg/mL injection 0.02 mg PRN    ondansetron injection 4 mg Q8H PRN    sodium chloride 0.9% flush 10 mL Q12H PRN    vancomycin - pharmacy to dose pharmacy to manage frequency       Objective:     Vital Signs (Most Recent):  Temp: 97.9 °F (36.6 °C) (10/27/23 0645)  Pulse: 87 (10/27/23 0645)  Resp: 18 (10/27/23 0645)  BP: (!) 155/80 (10/27/23 0645)  SpO2: 98 % (10/27/23 0645) Vital Signs (24h Range):  Temp:  [97.9 °F (36.6 °C)-99.6 °F (37.6 °C)] 97.9 °F (36.6 °C)  Pulse:  [87-95] 87  Resp:  [18] 18  SpO2:  [95 %-100 %] 98 %  BP: (144-155)/(71-80) 155/80     Weight: 72.6 kg (160 lb) (10/26/23 0729)  Body mass index is 26.63 kg/m².  Body surface area is 1.82 meters squared.    No intake/output data  recorded.    Physical Exam No edema. Chest clear. Hear regular    Significant Labs:sureBMP:   Recent Labs   Lab 10/25/23  0513 10/26/23  1058 10/27/23  0449   *   < > 367*      < > 140   K 3.6   < > 4.1   *   < > 107   CO2 29   < > 24   BUN 39*   < > 35*   CREATININE 2.69*   < > 3.10*   CALCIUM 8.2*   < > 8.5   MG 2.3  --   --     < > = values in this interval not displayed.     CBC:   Recent Labs   Lab 10/27/23  0449   WBC 12.54*   RBC 3.59*   HGB 9.6*   HCT 29.3*      MCV 81.6   MCH 26.7*   MCHC 32.8     All labs within the past 24 hours have been reviewed.    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    Thank you for your consult. I will follow-up with patient. Please contact us if you have any additional questions.    Devan Aponte MD  Nephrology  [facility]
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
## 2023-10-27 NOTE — PLAN OF CARE
Per MD pt remains on ABX for UTI, consulted Nephrology to evaluate pt for possible HD. Will follow dc needs arise.

## 2023-10-27 NOTE — PROGRESS NOTES
[facility]  General Surgery  Progress Note    Subjective:     History of Present Illness:  38-year-old male past medical history end-stage renal disease on HD, paraplegia, diabetes who was seen in consult today for nonhealing sacral wound.  Patient is a resident of [facility] who was admitted from the emergency room for altered mental status.  Patient is nonverbal and unable to provide any medical history.  Patient has sacral wound and wound on left buttock.     In the ED, the patient was afebrile and vital sign stable. CT head was negative for acute findings. Ensuring work was significant for UA which suggest UTI and leukocytosis with left shift band. Other findings were chronic microcytic anemia and CKD with CR of 2.8 and hypoalbuminemia of 1.4. Plan to take patient to the OR for wound debridement today.                 Post-Op Info:  Procedure(s) (LRB):  DEBRIDEMENT, BUTTOCK (Bilateral)   2 Days Post-Op     Interval History:  No acute events overnight.  White blood count down to 12.  Wound care shows E coli and Enterococcus.  Currently on Unasyn.    Medications:  Continuous Infusions:  Scheduled Meds:   amLODIPine  5 mg Oral Daily    ampicillin-sulbactim (UNASYN) IVPB  1.5 g Intravenous Q12H    enoxparin  30 mg Subcutaneous Q24H (prophylaxis, 1700)    insulin detemir U-100  10 Units Subcutaneous BID    mupirocin   Nasal BID     PRN Meds:sodium chloride 0.9%, dextrose 10%, dextrose 10%, glucagon (human recombinant), glucose, glucose, hydrALAZINE, insulin aspart U-100, naloxone, ondansetron, sodium chloride 0.9%     Review of patient's allergies indicates:  No Known Allergies  Objective:     Vital Signs (Most Recent):  Temp: 98.1 °F (36.7 °C) (10/27/23 1015)  Pulse: 87 (10/27/23 1430)  Resp: 18 (10/27/23 1430)  BP: 132/83 (10/27/23 1430)  SpO2:  (in dialysis) (10/27/23 1300) Vital Signs (24h Range):  Temp:  [97.9 °F (36.6 °C)-99 °F (37.2 °C)] 98.1 °F (36.7 °C)  Pulse:   [83-93] 87  Resp:  [18-22] 18  SpO2:  [94 %-98 %] 94 %  BP: (126-157)/(71-88) 132/83     Weight: 72.6 kg (160 lb)  Body mass index is 26.63 kg/m².    Intake/Output - Last 3 Shifts         10/25 0700  10/26 0659 10/26 0700  10/27 0659 10/27 0700  10/28 0659    Other 0      IV Piggyback 75      Total Intake(mL/kg) 75 (1)      Other 1500      Total Output 1500      Net -1425                      Physical Exam  Vitals and nursing note reviewed.   Constitutional:       General: He is not in acute distress.     Appearance: Normal appearance.   HENT:      Head: Normocephalic and atraumatic.      Right Ear: External ear normal.      Left Ear: External ear normal.      Nose: Nose normal.      Mouth/Throat:      Mouth: Mucous membranes are moist.   Eyes:      General:         Right eye: No discharge.         Left eye: No discharge.      Extraocular Movements: Extraocular movements intact.   Cardiovascular:      Rate and Rhythm: Normal rate and regular rhythm.      Pulses: Normal pulses.      Heart sounds: Normal heart sounds.   Pulmonary:      Effort: Pulmonary effort is normal.      Breath sounds: Normal breath sounds.   Abdominal:      Palpations: Abdomen is soft.      Tenderness: There is no abdominal tenderness. There is no guarding or rebound.   Musculoskeletal:      Cervical back: Neck supple.      Right lower leg: No edema.      Left lower leg: No edema.   Skin:     General: Skin is warm.      Capillary Refill: Capillary refill takes less than 2 seconds.      Comments: Bilateral sacral wounds   Neurological:      Mental Status: He is alert. Mental status is at baseline.   Psychiatric:         Mood and Affect: Mood normal.         Behavior: Behavior normal.          Significant Labs:  I have reviewed all pertinent lab results within the past 24 hours.    Significant Diagnostics:  I have reviewed all pertinent imaging results/findings within the past 24 hours.    Assessment/Plan:     * Sacral wound  OR for wound  debridement today.   IV Meropenem  Adjust antibx to cx  10/26/23  status post debridement of buttock wounds.  White count today is 16.  Preliminary wound cultures showing Gram-negative bacilli.  Sensitivity pending.  Inpatient wound care consulted  10/27/23   White blood count down to 12.  Wound care shows E coli and Enterococcus.  Currently on Unasyn.  Dsg change. Mild malodor. Consider washout of wound.           Parish Chavez, ANDRIA  General Surgery  [facility]

## 2023-10-28 LAB
ALBUMIN SERPL BCP-MCNC: 1.5 G/DL (ref 3.5–5)
ALBUMIN/GLOB SERPL: 0.4 {RATIO}
ALP SERPL-CCNC: 144 U/L (ref 45–115)
ALT SERPL W P-5'-P-CCNC: 9 U/L (ref 16–61)
ANION GAP SERPL CALCULATED.3IONS-SCNC: 8 MMOL/L (ref 7–16)
AST SERPL W P-5'-P-CCNC: 13 U/L (ref 15–37)
BASOPHILS # BLD AUTO: 0.02 K/UL (ref 0–0.2)
BASOPHILS NFR BLD AUTO: 0.2 % (ref 0–1)
BILIRUB SERPL-MCNC: 0.6 MG/DL (ref ?–1.2)
BUN SERPL-MCNC: 22 MG/DL (ref 7–18)
BUN/CREAT SERPL: 10 (ref 6–20)
CALCIUM SERPL-MCNC: 8.1 MG/DL (ref 8.5–10.1)
CHLORIDE SERPL-SCNC: 108 MMOL/L (ref 98–107)
CO2 SERPL-SCNC: 29 MMOL/L (ref 21–32)
CREAT SERPL-MCNC: 2.29 MG/DL (ref 0.7–1.3)
DIFFERENTIAL METHOD BLD: ABNORMAL
EGFR (NO RACE VARIABLE) (RUSH/TITUS): 37 ML/MIN/1.73M2
EOSINOPHIL # BLD AUTO: 0.39 K/UL (ref 0–0.5)
EOSINOPHIL NFR BLD AUTO: 3.9 % (ref 1–4)
ERYTHROCYTE [DISTWIDTH] IN BLOOD BY AUTOMATED COUNT: 15.5 % (ref 11.5–14.5)
GLOBULIN SER-MCNC: 3.6 G/DL (ref 2–4)
GLUCOSE SERPL-MCNC: 168 MG/DL (ref 74–106)
GLUCOSE SERPL-MCNC: 170 MG/DL (ref 70–105)
GLUCOSE SERPL-MCNC: 172 MG/DL (ref 70–105)
GLUCOSE SERPL-MCNC: 179 MG/DL (ref 70–105)
GLUCOSE SERPL-MCNC: 181 MG/DL (ref 70–105)
HCT VFR BLD AUTO: 26.9 % (ref 40–54)
HGB BLD-MCNC: 9.1 G/DL (ref 13.5–18)
IMM GRANULOCYTES # BLD AUTO: 0.08 K/UL (ref 0–0.04)
IMM GRANULOCYTES NFR BLD: 0.8 % (ref 0–0.4)
LYMPHOCYTES # BLD AUTO: 0.88 K/UL (ref 1–4.8)
LYMPHOCYTES NFR BLD AUTO: 8.7 % (ref 27–41)
MCH RBC QN AUTO: 27.9 PG (ref 27–31)
MCHC RBC AUTO-ENTMCNC: 33.8 G/DL (ref 32–36)
MCV RBC AUTO: 82.5 FL (ref 80–96)
MICROORGANISM SPEC CULT: ABNORMAL
MICROORGANISM SPEC CULT: ABNORMAL
MONOCYTES # BLD AUTO: 0.78 K/UL (ref 0–0.8)
MONOCYTES NFR BLD AUTO: 7.7 % (ref 2–6)
MPC BLD CALC-MCNC: 11.3 FL (ref 9.4–12.4)
NEUTROPHILS # BLD AUTO: 7.94 K/UL (ref 1.8–7.7)
NEUTROPHILS NFR BLD AUTO: 78.7 % (ref 53–65)
NRBC # BLD AUTO: 0 X10E3/UL
NRBC, AUTO (.00): 0 %
PLATELET # BLD AUTO: 186 K/UL (ref 150–400)
POTASSIUM SERPL-SCNC: 4 MMOL/L (ref 3.5–5.1)
PROT SERPL-MCNC: 5.1 G/DL (ref 6.4–8.2)
RBC # BLD AUTO: 3.26 M/UL (ref 4.6–6.2)
SODIUM SERPL-SCNC: 141 MMOL/L (ref 136–145)
WBC # BLD AUTO: 10.09 K/UL (ref 4.5–11)

## 2023-10-28 PROCEDURE — 99239 HOSP IP/OBS DSCHRG MGMT >30: CPT | Mod: ,,, | Performed by: INTERNAL MEDICINE

## 2023-10-28 PROCEDURE — 63600175 PHARM REV CODE 636 W HCPCS: Performed by: INTERNAL MEDICINE

## 2023-10-28 PROCEDURE — 25000003 PHARM REV CODE 250: Performed by: INTERNAL MEDICINE

## 2023-10-28 PROCEDURE — 85025 COMPLETE CBC W/AUTO DIFF WBC: CPT | Performed by: INTERNAL MEDICINE

## 2023-10-28 PROCEDURE — 11000001 HC ACUTE MED/SURG PRIVATE ROOM

## 2023-10-28 PROCEDURE — 94761 N-INVAS EAR/PLS OXIMETRY MLT: CPT

## 2023-10-28 PROCEDURE — 63600175 PHARM REV CODE 636 W HCPCS

## 2023-10-28 PROCEDURE — 25000003 PHARM REV CODE 250: Performed by: SURGERY

## 2023-10-28 PROCEDURE — 80053 COMPREHEN METABOLIC PANEL: CPT | Performed by: INTERNAL MEDICINE

## 2023-10-28 PROCEDURE — 99239 PR HOSPITAL DISCHARGE DAY,>30 MIN: ICD-10-PCS | Mod: ,,, | Performed by: INTERNAL MEDICINE

## 2023-10-28 PROCEDURE — 82962 GLUCOSE BLOOD TEST: CPT

## 2023-10-28 RX ORDER — AMLODIPINE BESYLATE 10 MG/1
10 TABLET ORAL DAILY
Status: DISCONTINUED | OUTPATIENT
Start: 2023-10-28 | End: 2023-10-29 | Stop reason: HOSPADM

## 2023-10-28 RX ORDER — CLINDAMYCIN HYDROCHLORIDE 300 MG/1
300 CAPSULE ORAL EVERY 6 HOURS
Qty: 28 CAPSULE | Refills: 0 | Status: SHIPPED | OUTPATIENT
Start: 2023-10-28 | End: 2023-11-04

## 2023-10-28 RX ORDER — HYDROCODONE BITARTRATE AND ACETAMINOPHEN 7.5; 325 MG/1; MG/1
1 TABLET ORAL EVERY 6 HOURS PRN
Status: DISCONTINUED | OUTPATIENT
Start: 2023-10-28 | End: 2023-10-29 | Stop reason: HOSPADM

## 2023-10-28 RX ORDER — AMPICILLIN 500 MG/1
500 CAPSULE ORAL 3 TIMES DAILY
Qty: 21 CAPSULE | Refills: 0 | Status: SHIPPED | OUTPATIENT
Start: 2023-10-28 | End: 2023-11-04

## 2023-10-28 RX ORDER — ENOXAPARIN SODIUM 100 MG/ML
40 INJECTION SUBCUTANEOUS EVERY 24 HOURS
Status: DISCONTINUED | OUTPATIENT
Start: 2023-10-28 | End: 2023-10-29 | Stop reason: HOSPADM

## 2023-10-28 RX ADMIN — AMPICILLIN SODIUM AND SULBACTAM SODIUM 1.5 G: 1; .5 INJECTION, POWDER, FOR SOLUTION INTRAMUSCULAR; INTRAVENOUS at 03:10

## 2023-10-28 RX ADMIN — INSULIN ASPART 2 UNITS: 100 INJECTION, SOLUTION INTRAVENOUS; SUBCUTANEOUS at 05:10

## 2023-10-28 RX ADMIN — AMPICILLIN SODIUM AND SULBACTAM SODIUM 1.5 G: 1; .5 INJECTION, POWDER, FOR SOLUTION INTRAMUSCULAR; INTRAVENOUS at 05:10

## 2023-10-28 RX ADMIN — AMLODIPINE BESYLATE 10 MG: 10 TABLET ORAL at 10:10

## 2023-10-28 RX ADMIN — LACTULOSE 20 G: 20 SOLUTION ORAL at 10:10

## 2023-10-28 RX ADMIN — INSULIN DETEMIR 10 UNITS: 100 INJECTION, SOLUTION SUBCUTANEOUS at 10:10

## 2023-10-28 RX ADMIN — ENOXAPARIN SODIUM 40 MG: 100 INJECTION SUBCUTANEOUS at 05:10

## 2023-10-28 RX ADMIN — MUPIROCIN: 20 OINTMENT TOPICAL at 10:10

## 2023-10-28 RX ADMIN — HYDROCODONE BITARTRATE AND ACETAMINOPHEN 1 TABLET: 7.5; 325 TABLET ORAL at 01:10

## 2023-10-28 RX ADMIN — MUPIROCIN: 20 OINTMENT TOPICAL at 09:10

## 2023-10-28 NOTE — SUBJECTIVE & OBJECTIVE
Interval History:     NAEO  Pt cllinicaly improving w/ iv abx, dc back to NH soon on oral abx based on cultures.     Review of Systems   Respiratory: Negative.     Cardiovascular: Negative.    Gastrointestinal: Negative.      Objective:     Vital Signs (Most Recent):  Temp: 98 °F (36.7 °C) (10/28/23 0200)  Pulse: 91 (10/27/23 2200)  Resp: 18 (10/28/23 0200)  BP: (!) 163/88 (notified nurse) (10/28/23 0200)  SpO2: 98 % (10/28/23 0200) Vital Signs (24h Range):  Temp:  [97.9 °F (36.6 °C)-98.4 °F (36.9 °C)] 98 °F (36.7 °C)  Pulse:  [83-91] 91  Resp:  [17-22] 18  SpO2:  [94 %-100 %] 98 %  BP: (105-163)/(72-89) 163/88     Weight: 72.6 kg (160 lb)  Body mass index is 26.63 kg/m².    Intake/Output Summary (Last 24 hours) at 10/28/2023 0642  Last data filed at 10/27/2023 1555  Gross per 24 hour   Intake 0 ml   Output 2500 ml   Net -2500 ml           Physical Exam  Vitals and nursing note reviewed.   Constitutional:       General: He is not in acute distress.     Appearance: Normal appearance. He is ill-appearing.   HENT:      Head: Normocephalic and atraumatic.      Right Ear: External ear normal.      Left Ear: External ear normal.      Nose: Nose normal.   Eyes:      General:         Right eye: No discharge.         Left eye: No discharge.      Conjunctiva/sclera: Conjunctivae normal.   Cardiovascular:      Rate and Rhythm: Normal rate and regular rhythm.      Pulses: Normal pulses.      Heart sounds: Normal heart sounds.   Pulmonary:      Effort: Pulmonary effort is normal.      Breath sounds: Normal breath sounds.   Abdominal:      Palpations: Abdomen is soft.      Tenderness: There is no abdominal tenderness. There is no guarding or rebound.   Musculoskeletal:      Cervical back: Neck supple.      Right lower leg: No edema.      Left lower leg: No edema.   Skin:     Findings: Lesion present.   Neurological:      Mental Status: He is alert. Mental status is at baseline.      Comments: He is drowsy but, able to communicate  and is alert and oriented now   Psychiatric:         Mood and Affect: Mood normal.      Comments:               Significant Labs: All pertinent labs within the past 24 hours have been reviewed.    Significant Imaging: I have reviewed all pertinent imaging results/findings within the past 24 hours.

## 2023-10-28 NOTE — PROGRESS NOTES
Pharmacist Renal Dose Adjustment Note    Lee Escobar is a 38 y.o. male being treated with the medication Unasyn (ampicillin/sulbactam).    Patient Data:    Vital Signs (Most Recent):  Temp: 98 °F (36.7 °C) (10/28/23 1030)  Pulse: 92 (10/28/23 1030)  Resp: 18 (10/28/23 1030)  BP: (!) 167/95 (10/28/23 1030)  SpO2: 99 % (10/28/23 1030) Vital Signs (72h Range):  Temp:  [97 °F (36.1 °C)-99.6 °F (37.6 °C)]   Pulse:  [79-95]   Resp:  [12-22]   BP: (105-173)/(59-96)   SpO2:  [94 %-100 %]      Recent Labs   Lab 10/26/23  1058 10/27/23  0449 10/28/23  0515   CREATININE 2.05* 3.10* 2.29*     Serum creatinine: 2.29 mg/dL (H) 10/28/23 0515  Estimated creatinine clearance: 38.4 mL/min (A)    Frequency will be changed from q12h to q8h based on today's renal function.    Pharmacist's Name: Sarahi Perry PharmD  Pharmacist's Extension: [phone]

## 2023-10-28 NOTE — PROGRESS NOTES
Pharmacist Renal Dose Adjustment Note    Lee Escobar is a 38 y.o. male being treated with the medication enoxaparin    Patient Data:    Vital Signs (Most Recent):  Temp: 98 °F (36.7 °C) (10/28/23 1030)  Pulse: 92 (10/28/23 1030)  Resp: 18 (10/28/23 1030)  BP: (!) 167/95 (10/28/23 1030)  SpO2: 99 % (10/28/23 1030) Vital Signs (72h Range):  Temp:  [97 °F (36.1 °C)-99.6 °F (37.6 °C)]   Pulse:  [79-95]   Resp:  [12-22]   BP: (105-173)/(59-96)   SpO2:  [94 %-100 %]      Recent Labs   Lab 10/26/23  1058 10/27/23  0449 10/28/23  0515   CREATININE 2.05* 3.10* 2.29*     Serum creatinine: 2.29 mg/dL (H) 10/28/23 0515  Estimated creatinine clearance: 38.4 mL/min (A)    Medication: Enoxaparin dose: 30mg frequency q24h will be changed to medication: enoxaparin dose:40mg frequency:q24h    Pharmacist's Name: Sarahi Perry PharmD  Pharmacist's Extension: [phone]

## 2023-10-28 NOTE — DISCHARGE INSTRUCTIONS
Recommend Mendez BID and Boost Plus TID to aid in wound healing and provide extra nutrition needed for weight maintenance. If doesn't start eating recommend starting marinol or any appetite stumulant of choice.

## 2023-10-28 NOTE — HOSPITAL COURSE
For a more detailed hospital course see IP notes briefly, pt was a/w sepsis 2/2 wound infection in the setting of pressure sores. Pt was taken to the OR and had debridment done, cultures groiwng e coli and enterococcus sensitive to ampicillin. Pt is malnourishd, refusing alternate means of feed, hwoever has been eating most of his food here, recommennd continue to work w/ patient regarding oral intake. Pt is now HDS and can be transitioned to oral abx for his wound infection, NH to continue to do wound care. Pt will see Dr. Hamm in clinic, and SUSI Mendoza in wound center.    Condition: HDS  Dispo: NH.

## 2023-10-28 NOTE — PLAN OF CARE
"[facility]  Discharge Final Note    Primary Care Provider: Maria Elena Beck II, MD    Expected Discharge Date:     Final Discharge Note (most recent)       Final Note - 10/28/23 0937          Final Note    Assessment Type Final Discharge Note     Anticipated Discharge Disposition Skilled Nursing Facility     What phone number can be called within the next 1-3 days to see how you are doing after discharge? [phone]     Hospital Resources/Appts/Education Provided Community resources provided        Post-Acute Status    Post-Acute Authorization Placement     Post-Acute Placement Status Set-up Complete/Auth obtained     Patient choice form signed by patient/caregiver List with quality metrics by geographic area provided;List from CMS Compare;List from System Post-Acute Care     Discharge Delays None known at this time                     Important Message from Medicare              Follow-up providers       Maria Elena Beck II, MD   Specialty: Family Medicine   Relationship: PCP - General    [address] [place] [address] [place] [address]   Phone: [phone]       Next Steps: Follow up              After-discharge care                Destination       [facility]   Service: Nursing Home    [address] [place] [id number]   Phone: [phone]                           SW consulted for pt to return to [facility] today.  Contacted NH and verified that pt is able to return.  Shortly thereafter, I received a call from Naty, Nurse @ NH inquiring if pt was placed on a peg tube as he was "literally starving himself" prior to admission while at the NH. Dr. Bobby informed of nurse concerns and spoke with Naty by phone informing nurse that pt is eating and provided recommendations should pt refuse to eat.  Pt will return to NH today.  No further needs at this time.   "
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
## 2023-10-28 NOTE — PROGRESS NOTES
[facility]  Nephrology  Progress Note    Patient Name: Lee Escobar  MRN: [id number]  Admission Date: 10/24/2023  Hospital Length of Stay: 4 days  Attending Provider: Tosin Bobby MD   Primary Care Physician: Maria Elena Beck II, MD  Principal Problem:Wound infection    Subjective:     HPI: No notes on file    Interval History: The patient is resting.  He voices no complaints.    Review of patient's allergies indicates:  No Known Allergies  Current Facility-Administered Medications   Medication Frequency    0.9%  NaCl infusion PRN    amLODIPine tablet 10 mg Daily    ampicillin-sulbactam (UNASYN) 1.5 g in sodium chloride 0.9 % 100 mL IVPB (MB+) Q12H    bisacodyL suppository 10 mg Daily PRN    dextrose 10% bolus 125 mL 125 mL PRN    dextrose 10% bolus 250 mL 250 mL PRN    enoxaparin injection 30 mg Q24H (prophylaxis, 1700)    glucagon (human recombinant) injection 1 mg PRN    glucose chewable tablet 16 g PRN    glucose chewable tablet 24 g PRN    hydrALAZINE injection 10 mg Q6H PRN    HYDROcodone-acetaminophen 7.5-325 mg per tablet 1 tablet Q6H PRN    insulin aspart U-100 injection 0-10 Units Q6H PRN    insulin detemir U-100 injection 10 Units BID    lactulose 20 gram/30 mL solution Soln 20 g TID    mupirocin 2 % ointment BID    naloxone 0.4 mg/mL injection 0.02 mg PRN    ondansetron injection 4 mg Q8H PRN    sodium chloride 0.9% flush 10 mL Q12H PRN       Objective:     Vital Signs (Most Recent):  Temp: 97.9 °F (36.6 °C) (10/28/23 0621)  Pulse: 90 (10/28/23 0621)  Resp: 18 (10/28/23 0621)  BP: (!) 173/96 (10/28/23 0621)  SpO2: 100 % (10/28/23 0621) Vital Signs (24h Range):  Temp:  [97.9 °F (36.6 °C)-98.4 °F (36.9 °C)] 97.9 °F (36.6 °C)  Pulse:  [83-91] 90  Resp:  [17-18] 18  SpO2:  [98 %-100 %] 100 %  BP: (105-173)/(72-96) 173/96     Weight: 72.6 kg (160 lb) (10/26/23 0729)  Body mass index is 26.63 kg/m².  Body surface area is 1.82 meters squared.    I/O last 3 completed  shifts:  In: 0   Out: 2500 [Other:2500]     Physical Exam  Vitals reviewed.   HENT:      Head: Normocephalic and atraumatic.      Mouth/Throat:      Mouth: Mucous membranes are dry.   Eyes:      Pupils: Pupils are equal, round, and reactive to light.   Cardiovascular:      Rate and Rhythm: Regular rhythm.   Pulmonary:      Effort: Pulmonary effort is normal.   Abdominal:      Palpations: Abdomen is soft.   Neurological:      Mental Status: He is alert.   Psychiatric:         Mood and Affect: Mood normal.          Significant Labs:  BMP:   Recent Labs   Lab 10/25/23  0513 10/26/23  1058 10/28/23  0515   *   < > 168*      < > 141   K 3.6   < > 4.0   *   < > 108*   CO2 29   < > 29   BUN 39*   < > 22*   CREATININE 2.69*   < > 2.29*   CALCIUM 8.2*   < > 8.1*   MG 2.3  --   --     < > = values in this interval not displayed.     CBC:   Recent Labs   Lab 10/28/23  0638   WBC 10.09   RBC 3.26*   HGB 9.1*   HCT 26.9*      MCV 82.5   MCH 27.9   MCHC 33.8        Significant Imaging:  Labs: Reviewed    Assessment/Plan:     Neuro  Paraplegia following spinal cord injury  Chronic condition    Cardiac/Vascular  HTN (hypertension)  Chronic condition    Renal/  ESRD (end stage renal disease)  Continue with scheduled hemodialysis for this patient.    Oncology  Anemia in ESRD (end-stage renal disease)  Chronic condition    Decubitus    Thank you for your consult. I will follow-up with patient. Please contact us if you have any additional questions.    Fox Brown Jr, MD  Nephrology  [facility]

## 2023-10-28 NOTE — SUBJECTIVE & OBJECTIVE
Interval History: The patient is resting.  He voices no complaints.    Review of patient's allergies indicates:  No Known Allergies  Current Facility-Administered Medications   Medication Frequency    0.9%  NaCl infusion PRN    amLODIPine tablet 10 mg Daily    ampicillin-sulbactam (UNASYN) 1.5 g in sodium chloride 0.9 % 100 mL IVPB (MB+) Q12H    bisacodyL suppository 10 mg Daily PRN    dextrose 10% bolus 125 mL 125 mL PRN    dextrose 10% bolus 250 mL 250 mL PRN    enoxaparin injection 30 mg Q24H (prophylaxis, 1700)    glucagon (human recombinant) injection 1 mg PRN    glucose chewable tablet 16 g PRN    glucose chewable tablet 24 g PRN    hydrALAZINE injection 10 mg Q6H PRN    HYDROcodone-acetaminophen 7.5-325 mg per tablet 1 tablet Q6H PRN    insulin aspart U-100 injection 0-10 Units Q6H PRN    insulin detemir U-100 injection 10 Units BID    lactulose 20 gram/30 mL solution Soln 20 g TID    mupirocin 2 % ointment BID    naloxone 0.4 mg/mL injection 0.02 mg PRN    ondansetron injection 4 mg Q8H PRN    sodium chloride 0.9% flush 10 mL Q12H PRN       Objective:     Vital Signs (Most Recent):  Temp: 97.9 °F (36.6 °C) (10/28/23 0621)  Pulse: 90 (10/28/23 0621)  Resp: 18 (10/28/23 0621)  BP: (!) 173/96 (10/28/23 0621)  SpO2: 100 % (10/28/23 0621) Vital Signs (24h Range):  Temp:  [97.9 °F (36.6 °C)-98.4 °F (36.9 °C)] 97.9 °F (36.6 °C)  Pulse:  [83-91] 90  Resp:  [17-18] 18  SpO2:  [98 %-100 %] 100 %  BP: (105-173)/(72-96) 173/96     Weight: 72.6 kg (160 lb) (10/26/23 0729)  Body mass index is 26.63 kg/m².  Body surface area is 1.82 meters squared.    I/O last 3 completed shifts:  In: 0   Out: 2500 [Other:2500]     Physical Exam  Vitals reviewed.   HENT:      Head: Normocephalic and atraumatic.      Mouth/Throat:      Mouth: Mucous membranes are dry.   Eyes:      Pupils: Pupils are equal, round, and reactive to light.   Cardiovascular:      Rate and Rhythm: Regular rhythm.   Pulmonary:      Effort: Pulmonary effort is  normal.   Abdominal:      Palpations: Abdomen is soft.   Neurological:      Mental Status: He is alert.   Psychiatric:         Mood and Affect: Mood normal.          Significant Labs:  BMP:   Recent Labs   Lab 10/25/23  0513 10/26/23  1058 10/28/23  0515   *   < > 168*      < > 141   K 3.6   < > 4.0   *   < > 108*   CO2 29   < > 29   BUN 39*   < > 22*   CREATININE 2.69*   < > 2.29*   CALCIUM 8.2*   < > 8.1*   MG 2.3  --   --     < > = values in this interval not displayed.     CBC:   Recent Labs   Lab 10/28/23  0638   WBC 10.09   RBC 3.26*   HGB 9.1*   HCT 26.9*      MCV 82.5   MCH 27.9   MCHC 33.8        Significant Imaging:  Labs: Reviewed

## 2023-10-28 NOTE — NURSING
Nurse Naty called from [facility] for report on patient, report was given at this time.
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
## 2023-10-29 VITALS
SYSTOLIC BLOOD PRESSURE: 171 MMHG | DIASTOLIC BLOOD PRESSURE: 85 MMHG | OXYGEN SATURATION: 99 % | BODY MASS INDEX: 26.66 KG/M2 | RESPIRATION RATE: 18 BRPM | HEART RATE: 100 BPM | TEMPERATURE: 98 F | HEIGHT: 65 IN | WEIGHT: 160 LBS

## 2023-10-29 LAB
ALBUMIN SERPL BCP-MCNC: 1.5 G/DL (ref 3.5–5)
ALBUMIN/GLOB SERPL: 0.4 {RATIO}
ALP SERPL-CCNC: 144 U/L (ref 45–115)
ALT SERPL W P-5'-P-CCNC: 11 U/L (ref 16–61)
ANION GAP SERPL CALCULATED.3IONS-SCNC: 10 MMOL/L (ref 7–16)
AST SERPL W P-5'-P-CCNC: 16 U/L (ref 15–37)
BASOPHILS # BLD AUTO: 0.03 K/UL (ref 0–0.2)
BASOPHILS NFR BLD AUTO: 0.3 % (ref 0–1)
BILIRUB SERPL-MCNC: 0.5 MG/DL (ref ?–1.2)
BUN SERPL-MCNC: 43 MG/DL (ref 7–18)
BUN/CREAT SERPL: 13 (ref 6–20)
CALCIUM SERPL-MCNC: 8.3 MG/DL (ref 8.5–10.1)
CHLORIDE SERPL-SCNC: 109 MMOL/L (ref 98–107)
CO2 SERPL-SCNC: 28 MMOL/L (ref 21–32)
CREAT SERPL-MCNC: 3.31 MG/DL (ref 0.7–1.3)
DIFFERENTIAL METHOD BLD: ABNORMAL
EGFR (NO RACE VARIABLE) (RUSH/TITUS): 24 ML/MIN/1.73M2
EOSINOPHIL # BLD AUTO: 0.35 K/UL (ref 0–0.5)
EOSINOPHIL NFR BLD AUTO: 3.6 % (ref 1–4)
ERYTHROCYTE [DISTWIDTH] IN BLOOD BY AUTOMATED COUNT: 15.7 % (ref 11.5–14.5)
GLOBULIN SER-MCNC: 3.8 G/DL (ref 2–4)
GLUCOSE SERPL-MCNC: 189 MG/DL (ref 74–106)
HCT VFR BLD AUTO: 28.1 % (ref 40–54)
HGB BLD-MCNC: 9.6 G/DL (ref 13.5–18)
IMM GRANULOCYTES # BLD AUTO: 0.12 K/UL (ref 0–0.04)
IMM GRANULOCYTES NFR BLD: 1.2 % (ref 0–0.4)
LYMPHOCYTES # BLD AUTO: 0.83 K/UL (ref 1–4.8)
LYMPHOCYTES NFR BLD AUTO: 8.5 % (ref 27–41)
MCH RBC QN AUTO: 27.2 PG (ref 27–31)
MCHC RBC AUTO-ENTMCNC: 34.2 G/DL (ref 32–36)
MCV RBC AUTO: 79.6 FL (ref 80–96)
MONOCYTES # BLD AUTO: 0.8 K/UL (ref 0–0.8)
MONOCYTES NFR BLD AUTO: 8.2 % (ref 2–6)
MPC BLD CALC-MCNC: 11.8 FL (ref 9.4–12.4)
NEUTROPHILS # BLD AUTO: 7.59 K/UL (ref 1.8–7.7)
NEUTROPHILS NFR BLD AUTO: 78.2 % (ref 53–65)
NRBC # BLD AUTO: 0 X10E3/UL
NRBC, AUTO (.00): 0 %
PLATELET # BLD AUTO: 230 K/UL (ref 150–400)
POTASSIUM SERPL-SCNC: 4.5 MMOL/L (ref 3.5–5.1)
PROT SERPL-MCNC: 5.3 G/DL (ref 6.4–8.2)
RBC # BLD AUTO: 3.53 M/UL (ref 4.6–6.2)
SODIUM SERPL-SCNC: 142 MMOL/L (ref 136–145)
WBC # BLD AUTO: 9.72 K/UL (ref 4.5–11)

## 2023-10-29 PROCEDURE — 63600175 PHARM REV CODE 636 W HCPCS: Performed by: INTERNAL MEDICINE

## 2023-10-29 PROCEDURE — 80053 COMPREHEN METABOLIC PANEL: CPT | Performed by: INTERNAL MEDICINE

## 2023-10-29 PROCEDURE — 82962 GLUCOSE BLOOD TEST: CPT

## 2023-10-29 PROCEDURE — 94761 N-INVAS EAR/PLS OXIMETRY MLT: CPT

## 2023-10-29 PROCEDURE — 85025 COMPLETE CBC W/AUTO DIFF WBC: CPT | Performed by: INTERNAL MEDICINE

## 2023-10-29 PROCEDURE — 25000003 PHARM REV CODE 250: Performed by: INTERNAL MEDICINE

## 2023-10-29 RX ADMIN — INSULIN DETEMIR 10 UNITS: 100 INJECTION, SOLUTION SUBCUTANEOUS at 09:10

## 2023-10-29 RX ADMIN — AMLODIPINE BESYLATE 10 MG: 10 TABLET ORAL at 09:10

## 2023-10-29 RX ADMIN — AMPICILLIN SODIUM AND SULBACTAM SODIUM 1.5 G: 1; .5 INJECTION, POWDER, FOR SOLUTION INTRAMUSCULAR; INTRAVENOUS at 06:10

## 2023-10-29 RX ADMIN — AMPICILLIN SODIUM AND SULBACTAM SODIUM 1.5 G: 1; .5 INJECTION, POWDER, FOR SOLUTION INTRAMUSCULAR; INTRAVENOUS at 12:10

## 2023-10-30 LAB
BACTERIA BLD CULT: NORMAL
BACTERIA SPEC ANAEROBE CULT: ABNORMAL

## 2023-10-31 LAB — GLUCOSE SERPL-MCNC: 108 MG/DL (ref 70–105)

## 2023-11-02 NOTE — PHYSICIAN QUERY
PT Name: Lee Escobar  MR #: [id number]    DOCUMENTATION CLARIFICATION     CDS: Bari THOMPSON,RN        Contact information:[email or web address]    This form is a permanent document in the medical record.     Query Date: November 2, 2023    By submitting this query, we are merely seeking further clarification of documentation.. Please utilize your independent clinical judgment when addressing the question(s) below.    The medical record contains the following:   Indicators  Supporting Clinical Findings Location in Medical Record   x Registered Dietician Diagnosis Malnutrition (Moderate) related to Chronic illness as evidenced by weight loss and moderate muscle and fat wasting  Is Patient Malnourished: Yes Malnutrition Assessment  Malnutrition Context: chronic illness  Malnutrition Level: moderate  Patient meets criteria for moderate protein calorie malnutrition due to weight loss and moderate fat and muscle wasting.            10/26  Nutrition  PN    Energy Intake     x Weight Loss Weight Loss (Malnutrition): greater than 10% in 6 months  He is a weight loss of 21.9% x 6 months.   10/26  Nutrition  PN   x Fat Loss Subcutaneous Fat (Malnutrition): moderate depletion  Upper Arm Region (Subcutaneous Fat Loss): moderate depletion  Thoracic and Lumbar Region: moderate depletion  10/26  Nutrition  PN   x Muscle Loss Muscle Mass (Malnutrition): moderate depletion   Clavicle Bone Region (Muscle Loss): moderate depletion  Clavicle and Acromion Bone Region (Muscle Loss): moderate depletion  Scapular Bone Region (Muscle Loss): moderate depletion  10/26  Nutrition  PN    Edema/Fluid Accumulation      Reduced  Strength (by dynamometer)     x Weight, BMI, Usual Body Weight WT: 160 lb  BMI: 26.6 10/26  Nutrition  PN   x Delayed Wound Healing Patient admitted with sacral wound. Wound was debrided 10/25/2023.  10/26  Nutrition  PN   x Acute or Chronic Illness Sacral wound, metabolic encephalopathy, UTI , type 2 diabetes  mellitus with hyperglycemia, anemia in  ESRD,anemia in ESRD, paraplegia following spinal cord injury,HTN    RD to see him for malnutrition.    10/26 HM PN          10/26  HM PN    Social or Environmental Circumstances     x Treatment Recommend advance diet to a Renal high protein daibetic consistent carbohydrate diet as medically appropriate 10/26  Nutrition  PN    Other       Academy of Nutrition and Dietetics (Academy) and the American Society for Parenteral and Enteral Nutrition (A.S.P.E.N.) Clinical Characteristics to support Malnutrition      Criteria for mild malnutrition is defined as 1 characteristic outlined above within the established moderate or severe parameters.  A minimum of 2 out of the 6 characteristics noted above are recommended for a diagnosis of moderate or severe malnutrition.  Chronic illness/injury is a disease/condition lasting 3 months or longer.    The noted clinical guidelines are only system guidelines and do not replace the providers clinical judgment.    Provider, please confirm the degree of malnutrition diagnosis  associated with above clinical findings.    [ x ] Moderate Malnutrition - a minimum of 2 of the 6 moderate malnutrition characteristics noted above    [  ] Other Nutritional Diagnosis (please specify): _______     Please document in your progress notes daily for the duration of treatment until resolved and  include in your discharge summary.      References:    FRIEDA Cooper, & OC Jaimes (2022, April). Assessment and management of anorexia and cachexia in palliative care. Retrieved May 23, 2022, from https://www.[facility].[employer]/contents/assessment-and-management-of-anorexia-and-cachexia-in-palliative-care?[email or web address]&source=see_link     GAGE Alston, PhD, RD, Heri STONE P., PhD, RN, WALE Prince MD, PhD, Mark STEIN A., MS, RD, CNSC, ZAKI Lauren, MS, RD, The Academy Malnutrition Work Group, The A.S.P.E.N. Board of Directors. (2012). Consensus Statement: Academy of  Nutrition and Dietetics and American Society for Parenteral and Enteral Nutrition: Characteristics Recommended for the Identification and Documentation of Adult Malnutrition (Undernutrition). Journal of Parenteral and Enteral Nutrition, 36(3), 275-283. doi:10.1177/2118603510925689     Form No. 66723

## 2023-11-04 LAB — GRAM STN SPEC: ABNORMAL

## 2023-11-14 ENCOUNTER — OFFICE VISIT (OUTPATIENT)
Dept: SURGERY | Facility: CLINIC | Age: 38
End: 2023-11-14
Attending: SURGERY
Payer: MEDICARE

## 2023-11-14 DIAGNOSIS — Z09 POSTOP CHECK: ICD-10-CM

## 2023-11-14 DIAGNOSIS — L08.9 WOUND INFECTION: Primary | Chronic | ICD-10-CM

## 2023-11-14 DIAGNOSIS — T14.8XXA WOUND INFECTION: Primary | Chronic | ICD-10-CM

## 2023-11-14 DIAGNOSIS — E43 SEVERE PROTEIN-CALORIE MALNUTRITION: ICD-10-CM

## 2023-11-14 PROCEDURE — 11043 DBRDMT MUSC&/FSCA 1ST 20/<: CPT | Mod: PBBFAC | Performed by: SURGERY

## 2023-11-14 PROCEDURE — 99499 NO LOS: ICD-10-PCS | Mod: S$PBB,,, | Performed by: SURGERY

## 2023-11-14 PROCEDURE — 11043 PR DEBRIDEMENT, SKIN, SUB-Q TISSUE,MUSCLE,=<20 SQ CM: ICD-10-PCS | Mod: S$PBB,,, | Performed by: SURGERY

## 2023-11-14 PROCEDURE — 99499 UNLISTED E&M SERVICE: CPT | Mod: S$PBB,,, | Performed by: SURGERY

## 2023-11-14 PROCEDURE — 99212 OFFICE O/P EST SF 10 MIN: CPT | Mod: PBBFAC | Performed by: SURGERY

## 2023-11-14 PROCEDURE — 11043 DBRDMT MUSC&/FSCA 1ST 20/<: CPT | Mod: S$PBB,,, | Performed by: SURGERY

## 2023-11-14 NOTE — PROGRESS NOTES
Debridement of sacral ulcer and right ischial ulcer.    Patient remained in the clinic operating room and was rolled into lateral position..  A curette scissors and forceps used to debride small amount of necrotic fascia skin subcutaneous tissue and muscle from the sacral ulcer.  Sacral ulcer measures 7.3 cm x 9.1 cm x 3.2 cm.  Pressure was held for hemostasis.      Subsequent to this, the right ischial wound was debrided of necrotic skin soft tissue and fascia using curette scissors forceps.  This ulcer measures 3.1 cm x 1.5 cm x 1.8 cm.  Pressure was held for hemostasis.      Patient did well with debridement.  Wounds were dressed wet-to-dry with saline moistened gauze and tape.

## 2023-11-14 NOTE — ASSESSMENT & PLAN NOTE
"Refer to wound care    Discussed colostomy with patient, but he declines, stating that it is "too much"  "
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
## 2023-11-15 LAB
MAYO GENERIC ORDERABLE RESULT: ABNORMAL
MAYO GENERIC ORDERABLE RESULT: ABNORMAL

## 2023-11-24 ENCOUNTER — HOSPITAL ENCOUNTER (EMERGENCY)
Facility: HOSPITAL | Age: 38
Discharge: HOME | End: 2023-11-24
Payer: MEDICARE

## 2023-11-24 VITALS
BODY MASS INDEX: 19.62 KG/M2 | RESPIRATION RATE: 21 BRPM | OXYGEN SATURATION: 100 % | HEIGHT: 67 IN | DIASTOLIC BLOOD PRESSURE: 72 MMHG | HEART RATE: 77 BPM | WEIGHT: 125 LBS | TEMPERATURE: 99 F | SYSTOLIC BLOOD PRESSURE: 149 MMHG

## 2023-11-24 DIAGNOSIS — L08.9 WOUND INFECTION: ICD-10-CM

## 2023-11-24 DIAGNOSIS — R41.82 ALTERED MENTAL STATUS: ICD-10-CM

## 2023-11-24 DIAGNOSIS — T14.8XXA WOUND INFECTION: ICD-10-CM

## 2023-11-24 DIAGNOSIS — N39.0 URINARY TRACT INFECTION WITHOUT HEMATURIA, SITE UNSPECIFIED: Primary | ICD-10-CM

## 2023-11-24 LAB
ALBUMIN SERPL BCP-MCNC: 1.3 G/DL (ref 3.5–5)
ALBUMIN/GLOB SERPL: 0.3 {RATIO}
ALP SERPL-CCNC: 117 U/L (ref 45–115)
ALT SERPL W P-5'-P-CCNC: 11 U/L (ref 16–61)
ANION GAP SERPL CALCULATED.3IONS-SCNC: 7 MMOL/L (ref 7–16)
AST SERPL W P-5'-P-CCNC: 7 U/L (ref 15–37)
BACTERIA #/AREA URNS HPF: ABNORMAL /HPF
BASOPHILS # BLD AUTO: 0.05 K/UL (ref 0–0.2)
BASOPHILS NFR BLD AUTO: 0.5 % (ref 0–1)
BILIRUB SERPL-MCNC: 0.4 MG/DL (ref ?–1.2)
BILIRUB UR QL STRIP: NEGATIVE
BUN SERPL-MCNC: 14 MG/DL (ref 7–18)
BUN/CREAT SERPL: 11 (ref 6–20)
CALCIUM SERPL-MCNC: 8 MG/DL (ref 8.5–10.1)
CHLORIDE SERPL-SCNC: 109 MMOL/L (ref 98–107)
CLARITY UR: ABNORMAL
CO2 SERPL-SCNC: 28 MMOL/L (ref 21–32)
COLOR UR: ABNORMAL
CREAT SERPL-MCNC: 1.24 MG/DL (ref 0.7–1.3)
DIFFERENTIAL METHOD BLD: ABNORMAL
EGFR (NO RACE VARIABLE) (RUSH/TITUS): 77 ML/MIN/1.73M2
EOSINOPHIL # BLD AUTO: 0.25 K/UL (ref 0–0.5)
EOSINOPHIL NFR BLD AUTO: 2.3 % (ref 1–4)
ERYTHROCYTE [DISTWIDTH] IN BLOOD BY AUTOMATED COUNT: 14.7 % (ref 11.5–14.5)
FLUAV AG UPPER RESP QL IA.RAPID: NEGATIVE
FLUBV AG UPPER RESP QL IA.RAPID: NEGATIVE
GLOBULIN SER-MCNC: 4.5 G/DL (ref 2–4)
GLUCOSE SERPL-MCNC: 232 MG/DL (ref 74–106)
GLUCOSE UR STRIP-MCNC: NORMAL MG/DL
HCT VFR BLD AUTO: 25.1 % (ref 40–54)
HGB BLD-MCNC: 7.3 G/DL (ref 13.5–18)
IMM GRANULOCYTES # BLD AUTO: 0.03 K/UL (ref 0–0.04)
IMM GRANULOCYTES NFR BLD: 0.3 % (ref 0–0.4)
KETONES UR STRIP-SCNC: NEGATIVE MG/DL
LACTATE SERPL-SCNC: 0.3 MMOL/L (ref 0.4–2)
LEUKOCYTE ESTERASE UR QL STRIP: ABNORMAL
LYMPHOCYTES # BLD AUTO: 0.45 K/UL (ref 1–4.8)
LYMPHOCYTES NFR BLD AUTO: 4.2 % (ref 27–41)
MAGNESIUM SERPL-MCNC: 1.9 MG/DL (ref 1.7–2.3)
MCH RBC QN AUTO: 27.9 PG (ref 27–31)
MCHC RBC AUTO-ENTMCNC: 29.1 G/DL (ref 32–36)
MCV RBC AUTO: 95.8 FL (ref 80–96)
MONOCYTES # BLD AUTO: 0.63 K/UL (ref 0–0.8)
MONOCYTES NFR BLD AUTO: 5.8 % (ref 2–6)
MPC BLD CALC-MCNC: 10.5 FL (ref 9.4–12.4)
NEUTROPHILS # BLD AUTO: 9.4 K/UL (ref 1.8–7.7)
NEUTROPHILS NFR BLD AUTO: 86.9 % (ref 53–65)
NITRITE UR QL STRIP: NEGATIVE
NRBC # BLD AUTO: 0 X10E3/UL
NRBC, AUTO (.00): 0 %
PH UR STRIP: 7 PH UNITS
PLATELET # BLD AUTO: 206 K/UL (ref 150–400)
POTASSIUM SERPL-SCNC: 3.2 MMOL/L (ref 3.5–5.1)
PROT SERPL-MCNC: 5.8 G/DL (ref 6.4–8.2)
PROT UR QL STRIP: 100
RBC # BLD AUTO: 2.62 M/UL (ref 4.6–6.2)
RBC # UR STRIP: ABNORMAL /UL
RBC #/AREA URNS HPF: >182 /HPF
SODIUM SERPL-SCNC: 141 MMOL/L (ref 136–145)
SP GR UR STRIP: 1.02
TROPONIN I SERPL DL<=0.01 NG/ML-MCNC: 15 PG/ML
UROBILINOGEN UR STRIP-ACNC: NORMAL MG/DL
WBC # BLD AUTO: 10.81 K/UL (ref 4.5–11)
WBC #/AREA URNS HPF: >182 /HPF
WBC CLUMPS, UA: ABNORMAL /HPF

## 2023-11-24 PROCEDURE — 99284 EMERGENCY DEPT VISIT MOD MDM: CPT | Mod: ,,, | Performed by: NURSE PRACTITIONER

## 2023-11-24 PROCEDURE — 81001 URINALYSIS AUTO W/SCOPE: CPT | Performed by: NURSE PRACTITIONER

## 2023-11-24 PROCEDURE — 83605 ASSAY OF LACTIC ACID: CPT | Performed by: NURSE PRACTITIONER

## 2023-11-24 PROCEDURE — 63600175 PHARM REV CODE 636 W HCPCS: Performed by: NURSE PRACTITIONER

## 2023-11-24 PROCEDURE — 84484 ASSAY OF TROPONIN QUANT: CPT | Performed by: NURSE PRACTITIONER

## 2023-11-24 PROCEDURE — 99284 PR EMERGENCY DEPT VISIT,LEVEL IV: ICD-10-PCS | Mod: ,,, | Performed by: NURSE PRACTITIONER

## 2023-11-24 PROCEDURE — 80053 COMPREHEN METABOLIC PANEL: CPT | Performed by: NURSE PRACTITIONER

## 2023-11-24 PROCEDURE — 85025 COMPLETE CBC W/AUTO DIFF WBC: CPT | Performed by: NURSE PRACTITIONER

## 2023-11-24 PROCEDURE — 83735 ASSAY OF MAGNESIUM: CPT | Performed by: NURSE PRACTITIONER

## 2023-11-24 PROCEDURE — 87149 DNA/RNA DIRECT PROBE: CPT | Performed by: NURSE PRACTITIONER

## 2023-11-24 PROCEDURE — 99285 EMERGENCY DEPT VISIT HI MDM: CPT | Mod: 25

## 2023-11-24 PROCEDURE — 87040 BLOOD CULTURE FOR BACTERIA: CPT | Performed by: NURSE PRACTITIONER

## 2023-11-24 PROCEDURE — 87077 CULTURE AEROBIC IDENTIFY: CPT | Performed by: NURSE PRACTITIONER

## 2023-11-24 PROCEDURE — 87086 URINE CULTURE/COLONY COUNT: CPT | Performed by: NURSE PRACTITIONER

## 2023-11-24 PROCEDURE — 87804 INFLUENZA ASSAY W/OPTIC: CPT | Performed by: NURSE PRACTITIONER

## 2023-11-24 PROCEDURE — 96365 THER/PROPH/DIAG IV INF INIT: CPT

## 2023-11-24 PROCEDURE — 25000003 PHARM REV CODE 250: Performed by: NURSE PRACTITIONER

## 2023-11-24 PROCEDURE — 96361 HYDRATE IV INFUSION ADD-ON: CPT

## 2023-11-24 RX ORDER — SULFAMETHOXAZOLE AND TRIMETHOPRIM 800; 160 MG/1; MG/1
1 TABLET ORAL 2 TIMES DAILY
Qty: 14 TABLET | Refills: 0 | Status: SHIPPED | OUTPATIENT
Start: 2023-11-24 | End: 2023-12-01

## 2023-11-24 RX ADMIN — CEFTRIAXONE SODIUM 2 G: 2 INJECTION, POWDER, FOR SOLUTION INTRAMUSCULAR; INTRAVENOUS at 06:11

## 2023-11-24 RX ADMIN — SODIUM CHLORIDE 500 ML: 9 INJECTION, SOLUTION INTRAVENOUS at 03:11
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
## 2023-11-25 LAB — VERIGENE RESULT: ABNORMAL

## 2023-11-26 LAB — UA COMPLETE W REFLEX CULTURE PNL UR: ABNORMAL

## 2023-11-28 LAB
BACTERIA BLD CULT: ABNORMAL
GRAM STN SPEC: ABNORMAL

## 2023-11-30 ENCOUNTER — OFFICE VISIT (OUTPATIENT)
Dept: WOUND CARE | Facility: CLINIC | Age: 38
End: 2023-11-30
Attending: FAMILY MEDICINE
Payer: MEDICARE

## 2023-11-30 VITALS
SYSTOLIC BLOOD PRESSURE: 139 MMHG | DIASTOLIC BLOOD PRESSURE: 76 MMHG | RESPIRATION RATE: 20 BRPM | HEART RATE: 76 BPM | TEMPERATURE: 97 F

## 2023-11-30 DIAGNOSIS — G82.20 PARAPLEGIA, UNSPECIFIED: ICD-10-CM

## 2023-11-30 DIAGNOSIS — N18.6 ESRD (END STAGE RENAL DISEASE): ICD-10-CM

## 2023-11-30 DIAGNOSIS — L08.9 WOUND INFECTION: Chronic | ICD-10-CM

## 2023-11-30 DIAGNOSIS — T14.8XXA WOUND INFECTION: Chronic | ICD-10-CM

## 2023-11-30 DIAGNOSIS — L89.154 PRESSURE INJURY OF SACRAL REGION, STAGE 4: Primary | ICD-10-CM

## 2023-11-30 DIAGNOSIS — E46 MALNUTRITION, UNSPECIFIED TYPE: ICD-10-CM

## 2023-11-30 LAB — BACTERIA BLD CULT: NORMAL

## 2023-11-30 PROCEDURE — 99214 OFFICE O/P EST MOD 30 MIN: CPT | Mod: PBBFAC | Performed by: NURSE PRACTITIONER

## 2023-11-30 PROCEDURE — 99213 PR OFFICE/OUTPT VISIT, EST, LEVL III, 20-29 MIN: ICD-10-PCS | Mod: S$PBB,,, | Performed by: NURSE PRACTITIONER

## 2023-11-30 PROCEDURE — 99213 OFFICE O/P EST LOW 20 MIN: CPT | Mod: S$PBB,,, | Performed by: NURSE PRACTITIONER

## 2023-11-30 NOTE — PATIENT INSTRUCTIONS
Clean wounds with baby shampoo and water  Apply vashe moisten drawtex to wound. Cover with dry gauze, ABD, secure with paper tape.  Change daily and prn.  Diabetes:  Monitor glucose closely. Check fasting glucose and 2 hours after meals. HgA1C goal <7, fasting glucose , and 2 hours after meals <180  Hypertension:  Check blood pressure twice daily, goal <120/80  Diet:   Increase protein intake, avoid fried, fatty foods and foods high in simple carbs.   Vitamins:  Take vitamin C 1000 mg, zinc 50mg, vitamin d 5000 units, and a daily multivitamin. Mendez is a good source of protein and nutrients to aid in wound healing.   Monitor closely for s/s of infection including fever, chills, increase in pain, odor from wound, and increased redness from foot. Go to ER if any complications develop.   Keep leg elevated and avoid pressure on wound.    Plan for surgery on 12/21/23 per Dr. YESSY Hamm for colostomy, PEG placement, and debridement of sacrum.   NPO after midnight 12/20/23.   Hold PO diabetic medications 12/19/23.  Hold all blood thinners and insulin 12/21/23.   Call Dr. Hamm with any questions.
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
## 2023-12-01 NOTE — ASSESSMENT & PLAN NOTE
Plan laparoscopic Gastrostomy tube placement  --R/b include infection, bleeding, migration of tube out of stomach.  He wishes to proceed.

## 2024-01-29 PROBLEM — N39.0 UTI (URINARY TRACT INFECTION): Status: RESOLVED | Noted: 2023-08-20 | Resolved: 2024-01-29

## 2025-02-04 NOTE — SUBJECTIVE & OBJECTIVE
Interval History:     Review of Systems   Constitutional:  Negative for chills and fever.   HENT:  Negative for congestion, hearing loss and trouble swallowing.    Eyes:  Negative for visual disturbance.   Respiratory:  Negative for cough and shortness of breath.    Cardiovascular:  Negative for chest pain, palpitations and leg swelling.   Gastrointestinal:  Negative for abdominal pain, blood in stool, diarrhea, nausea and vomiting.   Genitourinary:  Negative for difficulty urinating and hematuria.   Musculoskeletal:  Negative for back pain and myalgias.   Skin:  Positive for wound (left foot). Negative for rash.   Neurological:  Negative for dizziness, light-headedness and headaches.   Psychiatric/Behavioral:  Negative for sleep disturbance. The patient is not nervous/anxious.    Objective:     Vital Signs (Most Recent):  Temp: 98.6 °F (37 °C) (05/29/23 1630)  Pulse: 89 (05/29/23 1630)  Resp: 17 (05/29/23 1630)  BP: (!) 210/83 (05/29/23 1630)  SpO2: (!) 94 % (05/29/23 1630) Vital Signs (24h Range):  Temp:  [97.5 °F (36.4 °C)-98.6 °F (37 °C)] 98.6 °F (37 °C)  Pulse:  [73-89] 89  Resp:  [16-20] 17  SpO2:  [94 %-97 %] 94 %  BP: (152-210)/() 210/83     Weight: 76.4 kg (168 lb 6.9 oz)  Body mass index is 29.84 kg/m².    Intake/Output Summary (Last 24 hours) at 5/29/2023 1702  Last data filed at 5/28/2023 1847  Gross per 24 hour   Intake 240 ml   Output --   Net 240 ml         Physical Exam  Constitutional:       General: He is not in acute distress.     Appearance: Normal appearance. He is normal weight. He is not toxic-appearing.   HENT:      Head: Normocephalic and atraumatic.      Right Ear: External ear normal.      Left Ear: External ear normal.      Nose: Nose normal.      Mouth/Throat:      Mouth: Mucous membranes are moist.      Pharynx: Oropharynx is clear.   Eyes:      Extraocular Movements: Extraocular movements intact.      Conjunctiva/sclera: Conjunctivae normal.   Cardiovascular:      Rate and  Yes agree with seeing peds cardiology   Dr. Terrell is good choice.     Can we get peds echo done somewhere else prior to him seeing peds cardio? I.e. hospital?    Rhythm: Normal rate and regular rhythm.      Pulses: Normal pulses.      Heart sounds: Normal heart sounds. No murmur heard.  Pulmonary:      Effort: Pulmonary effort is normal.      Breath sounds: Normal breath sounds.   Abdominal:      General: Bowel sounds are normal. There is no distension.      Palpations: Abdomen is soft.      Tenderness: There is no abdominal tenderness.   Musculoskeletal:         General: Signs of injury (1st and 2nd digit trauma with infection, odor, and displacement of the nail) present. No swelling or tenderness.      Cervical back: Normal range of motion and neck supple.      Right lower leg: No edema.      Left lower leg: No edema.      Comments: HD fistula left forearm   Neurological:      Mental Status: He is alert and oriented to person, place, and time. Mental status is at baseline.   Psychiatric:         Mood and Affect: Mood normal.         Behavior: Behavior normal.         Thought Content: Thought content normal.         Judgment: Judgment normal.           Significant Labs: All pertinent labs within the past 24 hours have been reviewed.    Significant Imaging: I have reviewed all pertinent imaging results/findings within the past 24 hours.

## (undated) DEVICE — BANDAGE GAUZE COT STRL 4.5X4.1

## (undated) DEVICE — GLOVE 8.5 PROTEXIS PI BLUE

## (undated) DEVICE — GLOVE PROTEXIS PI SYN SURG 8.0

## (undated) DEVICE — SWAB AEROBIC CULTURETTE

## (undated) DEVICE — TRAY SKIN SCRUB WET PREMIUM

## (undated) DEVICE — GLOVE 7.0 PROTEXIS PI BLUE

## (undated) DEVICE — GLOVE 6.5 PROTEXIS PI BLUE

## (undated) DEVICE — GOWN NONREINF SET-IN SLV 2XL

## (undated) DEVICE — COVER SNAP KAP 26IN

## (undated) DEVICE — TOWEL OR DISP STRL BLUE 4/PK

## (undated) DEVICE — GLOW N TELL

## (undated) DEVICE — COVER PROBE WITH BAND 6X96IN

## (undated) DEVICE — GUIDEWIRE AQUATRACK REG 260CM

## (undated) DEVICE — SPONGE COTTON TRAY 4X4IN

## (undated) DEVICE — DEVICE INFLATION BASIX 25

## (undated) DEVICE — DERM CURETTE 5MM DISP

## (undated) DEVICE — Device

## (undated) DEVICE — GLOVE PROTEXIS PI SYN SURG 7

## (undated) DEVICE — GLOVE PROTEXIS PI SYN SURG 6.5

## (undated) DEVICE — SOL NACL IRR 1000ML BTL

## (undated) DEVICE — SHEATH BRITE TIP INTRO 11CM 5F

## (undated) DEVICE — CATHETER RENAL BIFURCATION 5FR X 65CM RBI

## (undated) DEVICE — COLLECTOR SPECIMEN ANAEROBIC

## (undated) DEVICE — GLOVE PROTEXIS PI SYN SURG 7.5

## (undated) DEVICE — KIT BASIC RUSH